# Patient Record
Sex: FEMALE | Race: WHITE | NOT HISPANIC OR LATINO | Employment: OTHER | URBAN - METROPOLITAN AREA
[De-identification: names, ages, dates, MRNs, and addresses within clinical notes are randomized per-mention and may not be internally consistent; named-entity substitution may affect disease eponyms.]

---

## 2018-06-11 ENCOUNTER — OFFICE VISIT (OUTPATIENT)
Dept: FAMILY MEDICINE | Facility: CLINIC | Age: 68
End: 2018-06-11
Payer: MEDICARE

## 2018-06-11 ENCOUNTER — TELEPHONE (OUTPATIENT)
Dept: FAMILY MEDICINE | Facility: CLINIC | Age: 68
End: 2018-06-11

## 2018-06-11 ENCOUNTER — HOSPITAL ENCOUNTER (EMERGENCY)
Facility: CLINIC | Age: 68
Discharge: HOME | End: 2018-06-11
Attending: EMERGENCY MEDICINE | Admitting: EMERGENCY MEDICINE
Payer: MEDICARE

## 2018-06-11 VITALS
HEART RATE: 77 BPM | BODY MASS INDEX: 29.66 KG/M2 | OXYGEN SATURATION: 96 % | HEIGHT: 67 IN | WEIGHT: 189 LBS | DIASTOLIC BLOOD PRESSURE: 96 MMHG | SYSTOLIC BLOOD PRESSURE: 242 MMHG | TEMPERATURE: 98.4 F

## 2018-06-11 VITALS
OXYGEN SATURATION: 96 % | SYSTOLIC BLOOD PRESSURE: 195 MMHG | WEIGHT: 189 LBS | HEART RATE: 85 BPM | BODY MASS INDEX: 29.6 KG/M2 | RESPIRATION RATE: 13 BRPM | DIASTOLIC BLOOD PRESSURE: 77 MMHG | TEMPERATURE: 98.1 F

## 2018-06-11 DIAGNOSIS — I16.0 HYPERTENSIVE URGENCY: ICD-10-CM

## 2018-06-11 DIAGNOSIS — I10 HYPERTENSION GOAL BP (BLOOD PRESSURE) < 130/80: ICD-10-CM

## 2018-06-11 DIAGNOSIS — I16.0 ASYMPTOMATIC HYPERTENSIVE URGENCY: Primary | ICD-10-CM

## 2018-06-11 LAB
ALBUMIN UR-MCNC: NEGATIVE MG/DL
ANION GAP SERPL CALCULATED.3IONS-SCNC: 12 MMOL/L (ref 3–14)
ANION GAP SERPL CALCULATED.3IONS-SCNC: 14 MMOL/L (ref 6–17)
APPEARANCE UR: CLEAR
BACTERIA #/AREA URNS HPF: ABNORMAL /HPF
BASOPHILS # BLD AUTO: 0.1 10E9/L (ref 0–0.2)
BASOPHILS NFR BLD AUTO: 0.9 %
BILIRUB UR QL STRIP: NEGATIVE
BUN SERPL-MCNC: 20 MG/DL (ref 7–30)
BUN SERPL-MCNC: 22 MG/DL (ref 7–30)
CA-I BLD-SCNC: 4.7 MG/DL (ref 4.4–5.2)
CALCIUM SERPL-MCNC: 9 MG/DL (ref 8.5–10.1)
CHLORIDE BLD-SCNC: 107 MMOL/L (ref 94–109)
CHLORIDE SERPL-SCNC: 109 MMOL/L (ref 94–109)
CO2 BLD-SCNC: 21 MMOL/L (ref 20–32)
CO2 SERPL-SCNC: 19 MMOL/L (ref 20–32)
COLOR UR AUTO: YELLOW
CREAT BLD-MCNC: 0.7 MG/DL (ref 0.52–1.04)
CREAT SERPL-MCNC: 0.93 MG/DL (ref 0.52–1.04)
DIFFERENTIAL METHOD BLD: NORMAL
EOSINOPHIL # BLD AUTO: 0.3 10E9/L (ref 0–0.7)
EOSINOPHIL NFR BLD AUTO: 3.2 %
ERYTHROCYTE [DISTWIDTH] IN BLOOD BY AUTOMATED COUNT: 12.9 % (ref 10–15)
GFR SERPL CREATININE-BSD FRML MDRD: 60 ML/MIN/1.7M2
GFR SERPL CREATININE-BSD FRML MDRD: 84 ML/MIN/1.7M2
GLUCOSE BLD-MCNC: 96 MG/DL (ref 70–99)
GLUCOSE SERPL-MCNC: 86 MG/DL (ref 70–99)
GLUCOSE UR STRIP-MCNC: NEGATIVE MG/DL
HCT VFR BLD AUTO: 43.7 % (ref 35–47)
HCT VFR BLD CALC: 43 %PCV (ref 35–47)
HGB BLD CALC-MCNC: 14.6 G/DL (ref 11.7–15.7)
HGB BLD-MCNC: 14.5 G/DL (ref 11.7–15.7)
HGB UR QL STRIP: NEGATIVE
IMM GRANULOCYTES # BLD: 0 10E9/L (ref 0–0.4)
IMM GRANULOCYTES NFR BLD: 0.3 %
KETONES UR STRIP-MCNC: 20 MG/DL
LEUKOCYTE ESTERASE UR QL STRIP: NEGATIVE
LYMPHOCYTES # BLD AUTO: 2.7 10E9/L (ref 0.8–5.3)
LYMPHOCYTES NFR BLD AUTO: 34.6 %
MCH RBC QN AUTO: 31.2 PG (ref 26.5–33)
MCHC RBC AUTO-ENTMCNC: 33.2 G/DL (ref 31.5–36.5)
MCV RBC AUTO: 94 FL (ref 78–100)
MONOCYTES # BLD AUTO: 0.7 10E9/L (ref 0–1.3)
MONOCYTES NFR BLD AUTO: 9.5 %
MUCOUS THREADS #/AREA URNS LPF: PRESENT /LPF
NEUTROPHILS # BLD AUTO: 4 10E9/L (ref 1.6–8.3)
NEUTROPHILS NFR BLD AUTO: 51.5 %
NITRATE UR QL: NEGATIVE
NRBC # BLD AUTO: 0 10*3/UL
NRBC BLD AUTO-RTO: 0 /100
PH UR STRIP: 6 PH (ref 5–7)
PLATELET # BLD AUTO: 289 10E9/L (ref 150–450)
POTASSIUM BLD-SCNC: 4 MMOL/L (ref 3.4–5.3)
POTASSIUM SERPL-SCNC: 4 MMOL/L (ref 3.4–5.3)
RBC # BLD AUTO: 4.65 10E12/L (ref 3.8–5.2)
RBC #/AREA URNS AUTO: 2 /HPF (ref 0–2)
SODIUM BLD-SCNC: 142 MMOL/L (ref 133–144)
SODIUM SERPL-SCNC: 140 MMOL/L (ref 133–144)
SOURCE: ABNORMAL
SP GR UR STRIP: 1.02 (ref 1–1.03)
SQUAMOUS #/AREA URNS AUTO: 2 /HPF (ref 0–1)
TROPONIN I BLD-MCNC: 0 UG/L (ref 0–0.1)
TROPONIN I SERPL-MCNC: <0.015 UG/L (ref 0–0.04)
UROBILINOGEN UR STRIP-MCNC: 0 MG/DL (ref 0–2)
WBC # BLD AUTO: 7.8 10E9/L (ref 4–11)
WBC #/AREA URNS AUTO: 2 /HPF (ref 0–5)

## 2018-06-11 PROCEDURE — 99284 EMERGENCY DEPT VISIT MOD MDM: CPT | Mod: 25

## 2018-06-11 PROCEDURE — 84484 ASSAY OF TROPONIN QUANT: CPT | Performed by: EMERGENCY MEDICINE

## 2018-06-11 PROCEDURE — 96375 TX/PRO/DX INJ NEW DRUG ADDON: CPT

## 2018-06-11 PROCEDURE — A9270 NON-COVERED ITEM OR SERVICE: HCPCS | Mod: GY | Performed by: EMERGENCY MEDICINE

## 2018-06-11 PROCEDURE — 85025 COMPLETE CBC W/AUTO DIFF WBC: CPT | Performed by: EMERGENCY MEDICINE

## 2018-06-11 PROCEDURE — 25000128 H RX IP 250 OP 636: Performed by: EMERGENCY MEDICINE

## 2018-06-11 PROCEDURE — 84484 ASSAY OF TROPONIN QUANT: CPT

## 2018-06-11 PROCEDURE — 80047 BASIC METABLC PNL IONIZED CA: CPT

## 2018-06-11 PROCEDURE — 81001 URINALYSIS AUTO W/SCOPE: CPT | Performed by: EMERGENCY MEDICINE

## 2018-06-11 PROCEDURE — 99213 OFFICE O/P EST LOW 20 MIN: CPT | Performed by: PHYSICIAN ASSISTANT

## 2018-06-11 PROCEDURE — 96374 THER/PROPH/DIAG INJ IV PUSH: CPT

## 2018-06-11 PROCEDURE — 93005 ELECTROCARDIOGRAM TRACING: CPT

## 2018-06-11 PROCEDURE — 25000132 ZZH RX MED GY IP 250 OP 250 PS 637: Performed by: EMERGENCY MEDICINE

## 2018-06-11 PROCEDURE — 80048 BASIC METABOLIC PNL TOTAL CA: CPT | Performed by: EMERGENCY MEDICINE

## 2018-06-11 PROCEDURE — 85014 HEMATOCRIT: CPT

## 2018-06-11 RX ORDER — HYDRALAZINE HYDROCHLORIDE 20 MG/ML
10 INJECTION INTRAMUSCULAR; INTRAVENOUS ONCE
Status: COMPLETED | OUTPATIENT
Start: 2018-06-11 | End: 2018-06-11

## 2018-06-11 RX ORDER — LABETALOL HYDROCHLORIDE 5 MG/ML
20 INJECTION, SOLUTION INTRAVENOUS ONCE
Status: DISCONTINUED | OUTPATIENT
Start: 2018-06-11 | End: 2018-06-11 | Stop reason: HOSPADM

## 2018-06-11 RX ORDER — TRIAMTERENE AND HYDROCHLOROTHIAZIDE 37.5; 25 MG/1; MG/1
1 CAPSULE ORAL DAILY
Qty: 30 CAPSULE | Refills: 1 | Status: SHIPPED | OUTPATIENT
Start: 2018-06-11 | End: 2018-06-14

## 2018-06-11 RX ORDER — CLONIDINE HYDROCHLORIDE 0.1 MG/1
0.1 TABLET ORAL ONCE
Status: COMPLETED | OUTPATIENT
Start: 2018-06-11 | End: 2018-06-11

## 2018-06-11 RX ADMIN — CLONIDINE HYDROCHLORIDE 0.1 MG: 0.1 TABLET ORAL at 10:48

## 2018-06-11 RX ADMIN — HYDRALAZINE HYDROCHLORIDE 10 MG: 20 INJECTION INTRAMUSCULAR; INTRAVENOUS at 12:01

## 2018-06-11 ASSESSMENT — ENCOUNTER SYMPTOMS
DIZZINESS: 0
HEADACHES: 0

## 2018-06-11 NOTE — PROGRESS NOTES
"  SUBJECTIVE:                                                    Miroslava Handy is a 68 year old female who presents to clinic today for the following health issues:    Recheck Ear and BP - was 230/135 at ER  ED/UC Followup:    Facility:  [facility]  Date of visit: 06/02/2018  Reason for visit: Left Otitis Media  Current Status: given Amox - 1 tablet left - feels much better     Patient is here today for a recheck.  She was in the hospital on 06.02.2018 for ear pain.  She was found to have a left ear infection.  She was put on amoxicillin and the ear pain is better.  She reports that she was advised to followup with here PCP for a recheck of the ear and to have the BP re-checked.  She reports that the ear pain is largely improved and she does not have any concerns regarding symptoms.    She says that when she was in the ED in [place], her BP was elevated.  She has not had any symptoms and therefore was not treated for the high blood pressure.  She says that she used to be on hydrochlorothiazide to treat hypertension, but stopped it a while back because she did not have money for it.     She does not have any symptoms or concerns today.      She says that about 2 weeks ago when she was mowing the lawn she got a little dizzy, but otherwise she typically does not have a problem.  She was in the ED after the dizzy      Problem list and histories reviewed & adjusted, as indicated.  Additional history: as documented      ROS:  Constitutional, HEENT, cardiovascular, pulmonary, GI, , musculoskeletal, neuro, skin, endocrine and psych systems are negative, except as otherwise noted.    OBJECTIVE:                                                    BP (!) 242/96 (BP Location: Left arm, Patient Position: Chair, Cuff Size: Adult Large)  Pulse 77  Temp 98.4  F (36.9  C) (Oral)  Ht 5' 7\" (1.702 m)  Wt 189 lb (85.7 kg)  SpO2 96%  Breastfeeding? No  BMI 29.6 kg/m2  Body mass index is 29.6 " kg/(m^2).  GENERAL: healthy, alert and no distress  EYES: Eyes grossly normal to inspection, PERRL and conjunctivae and sclerae normal  MS: no gross musculoskeletal defects noted, no edema  NEURO: Normal strength and tone, mentation intact and speech normal  PSYCH: mentation appears normal, affect normal/bright    Diagnostic Test Results:  none      ASSESSMENT/PLAN:                                                      Miroslava was seen today for recheck.    Diagnoses and all orders for this visit:    Asymptomatic hypertensive urgency    Hypertension goal BP (blood pressure) < 130/80    - Patient has a history of hypertension that has not been treated due to cost of prescriptions.  She is here today for a re-check of her ear that she reports is dramatically improved.  Her BP in clinic is very elevated, and therefore she was advised to go to the ER for further workup and assessment.    - Patient declined ambulance transport as she reports she is fine to drive herself.  She says she is asymptomatic and had an elevated BP at the [facility] last week that was untreated.    - Patient left clinic without further exam and agrees to transport to the ED by private vehicle.   - Ed has been called and informed of her pending arrival and they agree to continue workup and treatment on patient.     -- I have discussed the patient's diagnosis, and my plan of treatment with the patient and/or family. Patient is aware to followup if symptoms do not improve.  Patient has been advised to be seen sooner or seek more immediate care if symptoms change or worsen.  Patient agrees with and understands the plan today.     See Patient Instructions        Dena Jack PA-C    [facility]

## 2018-06-11 NOTE — TELEPHONE ENCOUNTER
Reason for Call:  Same Day Appointment, Requested Provider:  Rome Perdue MD    PCP: Rome Perdue    Reason for visit: ER f/u for HBP      Have you been treated for this in the past? Yes    Additional comments: Miroslava is requesting an appointment for Thursday 6/14/18. Any time will be ok.    Can we leave a detailed message on this number? YES    Phone number patient can be reached at: Home number on file [phone] (home)      Call taken on 6/11/2018 at 3:34 PM by Sandra Kimble

## 2018-06-11 NOTE — ED AVS SNAPSHOT
[facility] Emergency Department    [address]    [facility] [address]    Phone:  [phone]    Fax:  [phone]                                       Miroslava Handy   MRN: [id number]    Department:  [facility] Emergency Department   Date of Visit:  6/11/2018           After Visit Summary Signature Page     I have received my discharge instructions, and my questions have been answered. I have discussed any challenges I see with this plan with the nurse or doctor.    ..........................................................................................................................................  Patient/Patient Representative Signature      ..........................................................................................................................................  Patient Representative Print Name and Relationship to Patient    ..................................................               ................................................  Date                                            Time    ..........................................................................................................................................  Reviewed by Signature/Title    ...................................................              ..............................................  Date                                                            Time

## 2018-06-11 NOTE — DISCHARGE INSTRUCTIONS
Please followup with PCP in the next few days.    We started you on a gentle water pill/diuretic medicine.    Need a formal blood pressure evaluation.

## 2018-06-11 NOTE — MR AVS SNAPSHOT
After Visit Summary   6/11/2018    Miroslava Handy    MRN: [id number]           Patient Information     Date Of Birth          1950        Visit Information        Provider Department      6/11/2018 8:40 AM Dena Jack PA-C [facility]        Today's Diagnoses     Asymptomatic hypertensive urgency    -  1    Hypertension goal BP (blood pressure) < 130/80           Follow-ups after your visit        Follow-up notes from your care team     Return in about 1 day (around 6/12/2018) for More immediate care today at the ED for the HTN.  .      Your next 10 appointments already scheduled     Jun 20, 2018  8:15 AM CDT   MA SCREENING DIGITAL BILATERAL with [facility]   [facility]    [address] [place] [id number] [phone]           Do not use any powder, lotion or deodorant under your arms or on your breast. If you do, we will ask you to remove it before your exam.  Wear comfortable, two-piece clothing.  If you have any allergies, tell your care team.  Bring any previous mammograms from other facilities or have them mailed to the breast center.            Jun 20, 2018  8:45 AM CDT   Nurse Only with RV ANT[facility]AGULATION CLINIC   [facility]    [address] [place] [id number] [phone]            Jun 20, 2018  9:15 AM CDT   LAB with RV LAB   [facility]    [address] [place] [id number] [phone]           Please do not eat 10-12 hours before your appointment if you are coming in fasting for labs on lipids, cholesterol, or glucose (sugar). This does not apply to pregnant women. Water, hot tea and black coffee (with nothing added) are okay. Do not drink other fluids, diet soda or chew gum.              Who to contact     If you have questions or need follow up  "information about today's clinic visit or your schedule please contact [facility] directly at [phone].  Normal or non-critical lab and imaging results will be communicated to you by MyChart, letter or phone within 4 business days after the clinic has received the results. If you do not hear from us within 7 days, please contact the clinic through MyChart or phone. If you have a critical or abnormal lab result, we will notify you by phone as soon as possible.  Submit refill requests through [employer] or call your pharmacy and they will forward the refill request to us. Please allow 3 business days for your refill to be completed.          Additional Information About Your Visit        Care EveryWhere ID     This is your Care EveryWhere ID. This could be used by other organizations to access your [place] medical records  [id number]        Your Vitals Were     Pulse Temperature Height Pulse Oximetry Breastfeeding? BMI (Body Mass Index)    77 98.4  F (36.9  C) (Oral) 5' 7\" (1.702 m) 96% No 29.6 kg/m2       Blood Pressure from Last 3 Encounters:   06/14/18 152/90   06/11/18 195/77   06/11/18 (!) 242/96    Weight from Last 3 Encounters:   06/14/18 184 lb 9.6 oz (83.7 kg)   06/11/18 189 lb (85.7 kg)   06/11/18 189 lb (85.7 kg)              Today, you had the following     No orders found for display       Primary Care Provider Office Phone # Fax #    Rome Perdue [phone] [address] [facility] [address]        Equal Access to Services     [facility]: Hadii merlyn delarosa hadasho Soedwin, waaxda luqadaha, qaybta kaalmada rodger dorsey . So [facility] [phone].    ATENCIÓN: Si habla español, tiene a ramirez disposición servicios gratuitos de asistencia lingüística. Llame al [phone].    We comply with applicable federal civil rights laws and [place] laws. We do not discriminate on the basis of race, color, national origin, age, " disability, sex, sexual orientation, or gender identity.            Thank you!     Thank you for choosing [facility]  for your care. Our goal is always to provide you with excellent care. Hearing back from our patients is one way we can continue to improve our services. Please take a few minutes to complete the written survey that you may receive in the mail after your visit with us. Thank you!             Your Updated Medication List - Protect others around you: Learn how to safely use, store and throw away your medicines at www.disposemymeds.org.          This list is accurate as of 6/11/18 11:59 PM.  Always use your most recent med list.                   Brand Name Dispense Instructions for use Diagnosis    aspirin 81 MG tablet     100    ONE DAILY    Hypertension

## 2018-06-11 NOTE — ED TRIAGE NOTES
"Reports seen at clinic for ear infection, sent to ED due to \"high blood pressure\".  Denies dizziness, HA, or syncopal events.  Weston's intact,  "

## 2018-06-11 NOTE — ED PROVIDER NOTES
"  History     Chief Complaint:  Hypertension    HPI   Miroslava Handy is a 68 year old female with a history of HTN who presents to the emergency department for evaluation of HTN. Of note, the patient was seen at 6/2 for an ear infection when it was noted the patient had elevated blood pressure. The patient went in for recheck at clinic today for the ear and it was again noted that the patient had elevated blood pressure. She was sent to the ED today to get treatment for her current HTN. The patient denies any dizziness, headache, or syncope. She is also not on any medications and denies any past or recent heart problems. Of note, the patient's daughter indicates her last measured blood pressure was around 245/180.  Patient just finished her antibiotics.  No chest pain or cough.  No abdominal pain or vomiting.    Allergies:  Codeine     Medications:    Aspirin     Past Medical History:    Tobacco use disorder  HTN- not treated  Otitis media    Past Surgical History:    C exploratory of abdomen  HC dilation/curettage Diag/Ther x2    Family History:    HTN  CAD  Cerebrovascular disease  Leukemia  DM    Social History:  Presents with daughter.  Current every day smoker.  Positive for alcohol use.   Marital Status:   [4]     Review of Systems   Cardiovascular:        Hypertensive   Neurological: Negative for dizziness, syncope and headaches.   All other systems reviewed and are negative.  Report that she patient was seen at clinic for ear infection recheck, and sent to ED due to \"high blood pressure\".  Denies dizziness, HA, or syncopal events.     Physical Exam     Patient Vitals for the past 24 hrs:   BP Temp Temp src Pulse Heart Rate Resp SpO2 Weight   06/11/18 1433 195/77 - - - - - - -   06/11/18 1431 - - - - 79 13 96 % -   06/11/18 1400 190/83 - - - 78 13 96 % -   06/11/18 1300 180/83 - - - 79 14 97 % -   06/11/18 1230 196/80 - - - - - - -   06/11/18 1215 - - - - 80 25 96 % -   06/11/18 1200 197/87 - - - - " - - -   06/11/18 1158 197/87 - - - 73 13 95 % -   06/11/18 1108 (!) 238/88 - - - - - - -   06/11/18 1047 (!) 248/110 - - - - - - -   06/11/18 1026 (!) 240/92 98.1  F (36.7  C) Oral 85 - 20 99 % 85.7 kg (189 lb)     Physical Exam  GEN: patient smiling, no distress  HEAD: atraumatic, normocephalic  EYES: pupils reactive (3plus to 2plus), extraocular muscles intact, conjunctivae normal  ENT: TMs flat and white bilaterally, oropharynx normal with no erythema or exudate, mucus membranes moist  NECK: no cervical LAD, no JVD  RESPIRATORY: no tachypnea, breath sounds clear to auscultation (no rales, wheezes, rhonchi), chest wall nontender, normal phonation  CVS: normal S1/S2, no murmurs/rubs/gallops  ABDOMEN: soft, nontender, no masses or organomegaly, no rebound, positive bowel sounds  BACK: no costovertebral angle tenderness  EXTREMITIES: intact pulses x 4, full range of motion at joints, no edema  MUSCULOSKELETAL: no deformities  SKIN: warm and dry  NEURO: GCS 15, cranial nerves intact.  Motor- moves all 4 extremities.  Sensation- intact.  Coordination- ambulatory.  Overall symmetrical exam  HEME: no bruising or petechiae/contusions  LYMPH: no lymphadenopathy      Emergency Department Course   ECG:  Time: 1110  Vent. Rate 63 bpm. HI interval 158. QRS duration 86. QT/QTc 406/415. P-R-T axis 66 6 49. Normal sinus rhythm. Normal ECG. Read time: 1111  Normal axis.  No acute STEMI.    Laboratory:  CBC: WBC: 7.8, HGB: 14.5, PLT: 289  ISTAT BMP POCT: all WNL (Creatinine: 0.7)    1343 Troponin 1: 0.00    UA with micro: Keton 20, Bacteria Few, Squamous Epithelial 2 (H), Mucous Present, o/w negative    CBC with platelets differential (Final result) Component (Lab Inquiry)       Collection Time Result Time WBC RBC HGB HCT MCV     06/11/18 10:57:00 06/11/18 11:40:27 7.8 4.65 14.5 43.7 94          Collection Time Result Time MCH MCHC RDW PLT DIFF METHO     06/11/18 10:57:00 06/11/18 11:40:27 31.2 33.2 12.9 289 Automated Method       "    Collection Time Result Time % Neutrophils % Lymphocytes % Monocytes % Eosinophils % Basophils     06/11/18 10:57:00 06/11/18 11:40:27 51.5 34.6 9.5 3.2 0.9          Collection Time Result Time % Immature Granulocytes Nucleated RBCs Absolute Neutrophil Absolute Lymphocytes Absolute Monocytes     06/11/18 10:57:00 06/11/18 11:40:27 0.3 0 4.0 2.7 0.7          Collection Time Result Time Absolute Eosinophils Absolute Basophils Abs Immature Granulocytes Absolute Nucleated RBC     06/11/18 10:57:00 06/11/18 11:40:27 0.3 0.1 0.0 0.0                      Basic metabolic panel (Final result)    Abnormal Component (Lab Inquiry)       Collection Time Result Time NA POTASSIUM Chloride Carbon Dioxide ANION GAP     06/11/18 10:57:00 06/11/18 15:16:31 140 4.0 109 19 (L) 12          Collection Time Result Time GLUCOSE BUN CREATININE GFR Estimate GFR Estimate If Black     06/11/18 10:57:00 06/11/18 15:16:31 86 22 0.93 60 (L)  Non  GFR Calc 73   GFR Calc          Collection Time Result Time Calcium     06/11/18 10:57:00 06/11/18 15:16:31 9.0                      Troponin I (Final result) Component (Lab Inquiry)       Collection Time Result Time Troponin I ES     06/11/18 10:57:00 06/11/18 15:16:31 The 99th percentile for upper reference range is 0.045 ug/L. Troponin values    in the range of 0.045 - 0.120 ug/L may be associated with risks of adverse    clinical events.    ' data-bubble=\"IP_HOVER_BUBBLE_SERVICE\"><0.015  The 99th percentile fo...                   Interventions:  1048 Catapres 0.1 mg PO  1201 Apresoline 10 mg IV  Heplock  Cardiac/Sp02 monitoring  Oxygen by nasal cannula at 2L/min    Emergency Department Course:  Nursing notes and vitals reviewed. 1042 I performed an exam of the patient as documented above.     EKG obtained in the ED, see results above.     The patient provided a urine sample here in the emergency department. This was sent for laboratory testing, findings above. "     IV inserted. Medicine administered as documented above. Blood drawn. This was sent to the lab for further testing, results above.    1316 I rechecked the patient and discussed the results of her workup thus far.     Findings and plan explained to the Patient and daughter. Patient discharged home with instructions regarding supportive care, medications, and reasons to return. The importance of close follow-up was reviewed. The patient was prescribed Dyazide.    I personally reviewed the laboratory results with the Patient and daughter and answered all related questions prior to discharge.     12:24 PM /77  Pulse 85  Temp 98.1  F (36.7  C) (Oral)  Resp 13  Wt 85.7 kg (189 lb)  SpO2 96%  BMI 29.6 kg/m2    1:16 PM recheck    2:26 PM Updated, patient family in the room  /77  Pulse 85  Temp 98.1  F (36.7  C) (Oral)  Resp 13  Wt 85.7 kg (189 lb)  SpO2 96%  BMI 29.6 kg/m2    Discussed results with patient.  Gave patient copies of results (applicable labs, CT scans and/or ultrasound).  Answered questions.  Asked patient to followup with PCP.    Impression & Plan      Medical Decision Making:  Miroslava Handy is a 68 year old female who had a recent ear infection, for which she completed her course of antibiotics. She went back to clinic today to recheck the ear, and they noticed her blood pressure was elevated. On examination with me, the ears are normal and her neurological exam is normal and she is smiling and joking, but her blood pressure is systolic with 240 and the diastolic number is sometimes over 110 here in the ER. An IV was placed and lbas were sent and she was given Catapres orally, as well as Hydralazine IV. Her blood pressure has since then started to decrease. It is still elevated but not as high. Formal troponin and chemical panel is still pending, but the bedside troponin and chem panel is otherwise normal. CBC does not show any abnormalities and UA is pending. EKG did not show  any ST elevation, MI. At this point, the patient would like to go home, and will follow up PCP, which I think is reasonable. We will start her on Dyazide. She was given copies of all her studies. She understands that likely this will not be the final blood pressure medicine, and she will need to follow up with the PCP in the next few days.    Diagnosis:    ICD-10-CM   1. Hypertensive urgency I16.0       Disposition:  discharged to home with Dyazide.    Discharge Medications:  New Prescriptions    TRIAMTERENE-HYDROCHLOROTHIAZIDE (DYAZIDE) 37.5-25 MG PER CAPSULE    Take 1 capsule by mouth daily     Instructions to patient:  Please followup with PCP in the next few days.    We started you on a gentle water pill/diuretic medicine.    Need a formal blood pressure evaluation.    I, Soy Mike, am serving as a scribe on 6/11/2018 at 10:42 AM to personally document services performed by Mai David MD based on my observations and the provider's statements to me.     Soy Mike  6/11/2018   [facility] EMERGENCY DEPARTMENT       Mai David MD  06/12/18 1112

## 2018-06-11 NOTE — ED AVS SNAPSHOT
[facility] Emergency Department    [address]    [facility] [address]    Phone:  [phone]    Fax:  [phone]                                       Miroslava Handy   MRN: [id number]    Department:  [facility] Emergency Department   Date of Visit:  6/11/2018           Patient Information     Date Of Birth          1950        Your diagnoses for this visit were:     Hypertensive urgency        You were seen by Mai David MD.      Follow-up Information     Follow up with Rome Perdue MD.    Specialty:  Family Practice    Contact information:    [address] [place] [address]  [phone]          Discharge Instructions       Please followup with PCP in the next few days.    We started you on a gentle water pill/diuretic medicine.    Need a formal blood pressure evaluation.    Discharge References/Attachments     HYPERTENSION, NEW (BEGIN TREATMENT) (ENGLISH)    HIGH BLOOD PRESSURE, WHAT IS?  (ENGLISH)      24 Hour Appointment Hotline       To make an appointment at any [facility] clinic, call [phone] ([phone]). If you don't have a family doctor or clinic, we will help you find one. [facility] clinics are conveniently located to serve the needs of you and your family.             Review of your medicines      START taking        Dose / Directions Last dose taken    triamterene-hydrochlorothiazide 37.5-25 MG per capsule   Commonly known as:  DYAZIDE   Dose:  1 capsule   Quantity:  30 capsule        Take 1 capsule by mouth daily   Refills:  1          Our records show that you are taking the medicines listed below. If these are incorrect, please call your family doctor or clinic.        Dose / Directions Last dose taken    aspirin 81 MG tablet   Quantity:  100        ONE DAILY   Refills:  3                Prescriptions were sent or printed at these locations (1 Prescription)                   Other Prescriptions                 Printed at Department/Unit printer (1 of 1)         triamterene-hydrochlorothiazide (DYAZIDE) 37.5-25 MG per capsule                Procedures and tests performed during your visit     Basic metabolic panel    CBC with platelets differential    EKG 12-lead, tracing only    ISTAT Basic Met ICa HCT POCT    ISTAT Chem 8    ISTAT troponin    Peripheral IV catheter    Troponin I    Troponin POCT    UA with Microscopic      Orders Needing Specimen Collection     None      Pending Results     Date and Time Order Name Status Description    6/11/2018 1104 EKG 12-lead, tracing only Preliminary     6/11/2018 1036 UA with Microscopic In process     6/11/2018 1036 Troponin I In process     6/11/2018 1036 Basic metabolic panel In process             Pending Culture Results     Date and Time Order Name Status Description    6/11/2018 1036 UA with Microscopic In process             Pending Results Instructions     If you had any lab results that were not finalized at the time of your Discharge, you can call the ED Lab Result RN at [phone]. You will be contacted by this team for any positive Lab results or changes in treatment. The nurses are available 7 days a week from 10A to 6:30P.  You can leave a message 24 hours per day and they will return your call.        Test Results From Your Hospital Stay        6/11/2018 11:40 AM      Component Results     Component Value Ref Range & Units Status    WBC 7.8 4.0 - 11.0 10e9/L Final    RBC Count 4.65 3.8 - 5.2 10e12/L Final    Hemoglobin 14.5 11.7 - 15.7 g/dL Final    Hematocrit 43.7 35.0 - 47.0 % Final    MCV 94 78 - 100 fl Final    MCH 31.2 26.5 - 33.0 pg Final    MCHC 33.2 31.5 - 36.5 g/dL Final    RDW 12.9 10.0 - 15.0 % Final    Platelet Count 289 150 - 450 10e9/L Final    Diff Method Automated Method  Final    % Neutrophils 51.5 % Final    % Lymphocytes 34.6 % Final    % Monocytes 9.5 % Final    % Eosinophils 3.2 % Final    % Basophils 0.9 % Final    % Immature Granulocytes 0.3  % Final    Nucleated RBCs 0 0 /100 Final    Absolute Neutrophil 4.0 1.6 - 8.3 10e9/L Final    Absolute Lymphocytes 2.7 0.8 - 5.3 10e9/L Final    Absolute Monocytes 0.7 0.0 - 1.3 10e9/L Final    Absolute Eosinophils 0.3 0.0 - 0.7 10e9/L Final    Absolute Basophils 0.1 0.0 - 0.2 10e9/L Final    Abs Immature Granulocytes 0.0 0 - 0.4 10e9/L Final    Absolute Nucleated RBC 0.0  Final         6/11/2018 11:33 AM         6/11/2018 11:33 AM         6/11/2018  2:13 PM         6/11/2018  2:09 PM      Component Results     Component Value Ref Range & Units Status    Sodium 142 133 - 144 mmol/L Final    Potassium 4.0 3.4 - 5.3 mmol/L Final    Chloride 107 94 - 109 mmol/L Final    Total CO2 21 20 - 32 mmol/L Final    Anion Gap 14 6 - 17 mmol/L Final    Glucose 96 70 - 99 mg/dL Final    Urea Nitrogen 20 7 - 30 mg/dL Final    Creatinine 0.7 0.52 - 1.04 mg/dL Final    GFR Estimate 84 >60 mL/min/1.7m2 Final    GFR Estimate If Black >90 >60 mL/min/1.7m2 Final    Calcium Ionized 4.7 4.4 - 5.2 mg/dL Final    Hemoglobin 14.6 11.7 - 15.7 g/dL Final    Hematocrit - POCT 43 35.0 - 47.0 %PCV Final         6/11/2018  2:10 PM      Component Results     Component Value Ref Range & Units Status    Troponin I 0.00 0.00 - 0.10 ug/L Final                Clinical Quality Measure: Blood Pressure Screening     Your blood pressure was checked while you were in the emergency department today. The last reading we obtained was  BP: 190/83 . Please read the guidelines below about what these numbers mean and what you should do about them.  If your systolic blood pressure (the top number) is less than 120 and your diastolic blood pressure (the bottom number) is less than 80, then your blood pressure is normal. There is nothing more that you need to do about it.  If your systolic blood pressure (the top number) is 120-139 or your diastolic blood pressure (the bottom number) is 80-89, your blood pressure may be higher than it should be. You should have your  blood pressure rechecked within a year by a primary care provider.  If your systolic blood pressure (the top number) is 140 or greater or your diastolic blood pressure (the bottom number) is 90 or greater, you may have high blood pressure. High blood pressure is treatable, but if left untreated over time it can put you at risk for heart attack, stroke, or kidney failure. You should have your blood pressure rechecked by a primary care provider within the next 4 weeks.  If your provider in the emergency department today gave you specific instructions to follow-up with your doctor or provider even sooner than that, you should follow that instruction and not wait for up to 4 weeks for your follow-up visit.        Thank you for choosing [facility]       Thank you for choosing [facility] for your care. Our goal is always to provide you with excellent care. Hearing back from our patients is one way we can continue to improve our services. Please take a few minutes to complete the written survey that you may receive in the mail after you visit with us. Thank you!        Care EveryWhere ID     This is your Care EveryWhere ID. This could be used by other organizations to access your [facility] medical records  [id number]        Equal Access to Services     RADHA MYERS : Hadangelita Varela, anderson berrios, rodger pichardo. So [facility] [phone].    ATENCIÓN: Si habla español, tiene a ramirez disposición servicios gratuitos de asistencia lingüística. Llame al [phone].    We comply with applicable federal civil rights laws and [place] laws. We do not discriminate on the basis of race, color, national origin, age, disability, sex, sexual orientation, or gender identity.            After Visit Summary       This is your record. Keep this with you and show to your community pharmacist(s) and doctor(s) at your next visit.

## 2018-06-12 LAB — INTERPRETATION ECG - MUSE: NORMAL

## 2018-06-12 NOTE — PROGRESS NOTES
"  SUBJECTIVE:                                                    Miroslava Handy is a 68 year old female who presents to clinic today for the following health issues:    ED/UC Followup:    Facility:  [facility]   Date of visit: 6/11/18  Reason for visit: Hypertensive Urgency   Current Status: feels Ok - no headaches or chest pains. A bit tired and fatigued. Dropped 5 pounds with diuretic initiation.      Since the hospital visit Miroslava has stopped tobacco and caffeine use, which has caused some headaches. She has also has implemented a low sodium diet rich in proteins and vegetables..      BP Readings from Last 3 Encounters:   06/14/18 152/90   06/11/18 195/77   06/11/18 (!) 242/96     Problem list and histories reviewed & adjusted, as indicated.  Additional history: as documented    ROS:  Constitutional, HEENT, cardiovascular, pulmonary, GI, , musculoskeletal, neuro, skin, endocrine and psych systems are negative, except as otherwise noted.    This document serves as a record of the services and decisions personally performed and made by Rome Perdue MD. It was created on her behalf by Dalia Lang, a trained medical scribe. The creation of this document is based the provider's statements to the medical scribe.  Scribgavin Lang 2:00 PM, June 14, 2018    OBJECTIVE:                                                    /90  Pulse 100  Temp 98.1  F (36.7  C) (Oral)  Ht 1.702 m (5' 7\")  Wt 83.7 kg (184 lb 9.6 oz)  SpO2 97%  Breastfeeding? No  BMI 28.91 kg/m2 Body mass index is 28.91 kg/(m^2).     GENERAL: healthy, alert, well nourished, well hydrated, no distress  EYES: Eyes grossly normal to inspection, extraocular movements - intact, and PERRL  HENT:  Nose- normal; Mouth- no ulcers, no lesions  NECK: no tenderness, no adenopathy, no asymmetry, no masses, no stiffness; thyroid- normal to palpation  RESP: lungs clear to auscultation - no rales, no rhonchi, no wheezes  CV: No " narrowing of renal artery, otherwise regular rates and rhythm, normal S1 S2, no S3 or S4 and no murmur, no click or rub -  ABDOMEN: soft, no tenderness, no  hepatosplenomegaly, no masses, normal bowel sounds  MS: extremities- no gross deformities noted, no edema  SKIN: no suspicious lesions, no rashes    Diagnostic test results:  none      ASSESSMENT/PLAN:         Miroslava was seen today for hospital f/u.    Diagnoses and all orders for this visit:    Hypertension goal BP (blood pressure) < 130/80 - partially controlled  Encouraged patient to continue current healthy diet and avoid caffeine and tobacco use. Lisinopril is prescribed and instructions were given for use. Regular BP readings are recommended.   -     Cancel: Albumin Random Urine Quantitative with Creat Ratio  -     triamterene-hydrochlorothiazide (DYAZIDE) 37.5-25 MG per capsule; Take 1 capsule by mouth daily  -     lisinopril (PRINIVIL/ZESTRIL) 10 MG tablet; Take 1 tablet (10 mg) by mouth daily  -     Basic metabolic panel  (Ca, Cl, CO2, Creat, Gluc, K, Na, BUN); Future  -     MICROALBUMIN; Future  -     Albumin Random Urine Quantitative with Creat Ratio; Future    Tobacco use disorder - quit 6/12/18  Miroslava stopped tobacco use after hospital visit.     Hyperlipidemia LDL goal <100   Appointment made for next Wednesday to complete fasting labs.   -     Lipid panel reflex to direct LDL Fasting; Future    Screen for colon cancer  -     Fecal colorectal cancer screen (FIT); Future    Asymptomatic postmenopausal status    Visit for screening mammogram  Patient plans to come in next Wednesday for mammogram.   -     MA Screening Digital Bilateral; Future    Need for hepatitis C screening test - declined testing    Need for prophylactic vaccination against Streptococcus pneumoniae (pneumococcus)  -     Pneumococcal vaccine 13 valent PCV13 IM (Prevnar) [80453]  -          ADMIN VACCINE, ADDL [74143]    Need for prophylactic vaccination with tetanus-diphtheria  (TD)  -     ADMIN: Vaccine, Initial (60462)  -     TDAP VACCINE (ADACEL)    Risks, benefits and alternatives of treatments discussed. Plan agreed on.      Followup: Return in about 1 week (around 6/21/2018) for recheck.     See patient instructions.     Tobacco Cessation:   reports that she quit smoking today. Her smoking use included Cigarettes. She has a 27.90 pack-year smoking history. She has never used smokeless tobacco.    The information in this document, created by the medical scribe for me, accurately reflects the services I personally performed and the decisions made by me. I have reviewed and approved this document for accuracy prior to leaving the patient care area.  June 14, 2018 2:13 PM        Marcial Perdue MD   Pager: [phone]

## 2018-06-14 ENCOUNTER — OFFICE VISIT (OUTPATIENT)
Dept: FAMILY MEDICINE | Facility: CLINIC | Age: 68
End: 2018-06-14
Payer: MEDICARE

## 2018-06-14 VITALS
DIASTOLIC BLOOD PRESSURE: 90 MMHG | HEART RATE: 100 BPM | WEIGHT: 184.6 LBS | TEMPERATURE: 98.1 F | HEIGHT: 67 IN | SYSTOLIC BLOOD PRESSURE: 152 MMHG | OXYGEN SATURATION: 97 % | BODY MASS INDEX: 28.97 KG/M2

## 2018-06-14 DIAGNOSIS — Z78.0 ASYMPTOMATIC POSTMENOPAUSAL STATUS: ICD-10-CM

## 2018-06-14 DIAGNOSIS — E78.5 HYPERLIPIDEMIA LDL GOAL <100: ICD-10-CM

## 2018-06-14 DIAGNOSIS — Z12.11 SCREEN FOR COLON CANCER: ICD-10-CM

## 2018-06-14 DIAGNOSIS — I10 HYPERTENSION GOAL BP (BLOOD PRESSURE) < 130/80: Primary | ICD-10-CM

## 2018-06-14 DIAGNOSIS — Z12.31 VISIT FOR SCREENING MAMMOGRAM: ICD-10-CM

## 2018-06-14 DIAGNOSIS — Z23 NEED FOR PROPHYLACTIC VACCINATION WITH TETANUS-DIPHTHERIA (TD): ICD-10-CM

## 2018-06-14 DIAGNOSIS — Z11.59 NEED FOR HEPATITIS C SCREENING TEST: ICD-10-CM

## 2018-06-14 DIAGNOSIS — Z23 NEED FOR PROPHYLACTIC VACCINATION AGAINST STREPTOCOCCUS PNEUMONIAE (PNEUMOCOCCUS): ICD-10-CM

## 2018-06-14 DIAGNOSIS — F17.200 TOBACCO USE DISORDER: ICD-10-CM

## 2018-06-14 PROCEDURE — 90715 TDAP VACCINE 7 YRS/> IM: CPT | Performed by: FAMILY MEDICINE

## 2018-06-14 PROCEDURE — 99214 OFFICE O/P EST MOD 30 MIN: CPT | Mod: 25 | Performed by: FAMILY MEDICINE

## 2018-06-14 PROCEDURE — 90670 PCV13 VACCINE IM: CPT | Performed by: FAMILY MEDICINE

## 2018-06-14 PROCEDURE — 90472 IMMUNIZATION ADMIN EACH ADD: CPT | Performed by: FAMILY MEDICINE

## 2018-06-14 PROCEDURE — G0009 ADMIN PNEUMOCOCCAL VACCINE: HCPCS | Performed by: FAMILY MEDICINE

## 2018-06-14 RX ORDER — LISINOPRIL 10 MG/1
10 TABLET ORAL DAILY
Qty: 90 TABLET | Refills: 1 | Status: SHIPPED | OUTPATIENT
Start: 2018-06-14 | End: 2018-06-22

## 2018-06-14 RX ORDER — TRIAMTERENE AND HYDROCHLOROTHIAZIDE 37.5; 25 MG/1; MG/1
1 CAPSULE ORAL DAILY
Qty: 90 CAPSULE | Refills: 1 | Status: SHIPPED | OUTPATIENT
Start: 2018-06-14 | End: 2018-12-03

## 2018-06-14 NOTE — MR AVS SNAPSHOT
After Visit Summary   6/14/2018    Miroslava Handy    MRN: [id number]           Patient Information     Date Of Birth          1950        Visit Information        Provider Department      6/14/2018 1:40 PM Rome Perdue MD [facility]'s Diagnoses     Hypertension goal BP (blood pressure) < 130/80    -  1    Screen for colon cancer        Asymptomatic postmenopausal status        Visit for screening mammogram        Need for hepatitis C screening test        Need for prophylactic vaccination against Streptococcus pneumoniae (pneumococcus)        Need for prophylactic vaccination with tetanus-diphtheria (TD)        Tobacco use disorder - quit 6/12/18           Follow-ups after your visit        Follow-up notes from your care team     Return in about 1 week (around 6/21/2018) for Routine Visit, recheck.      Future tests that were ordered for you today     Open Future Orders        Priority Expected Expires Ordered    MA Screening Digital Bilateral Routine  6/14/2019 6/14/2018    Fecal colorectal cancer screen (FIT) Routine 7/5/2018 9/6/2018 6/14/2018    Basic metabolic panel  (Ca, Cl, CO2, Creat, Gluc, K, Na, BUN) Routine 7/14/2018 6/14/2019 6/14/2018            Who to contact     If you have questions or need follow up information about today's clinic visit or your schedule please contact [facility] directly at [phone].  Normal or non-critical lab and imaging results will be communicated to you by MyChart, letter or phone within 4 business days after the clinic has received the results. If you do not hear from us within 7 days, please contact the clinic through MyChart or phone. If you have a critical or abnormal lab result, we will notify you by phone as soon as possible.  Submit refill requests through [employer]-datum or call your pharmacy and they will forward the refill request to us. Please allow 3 business days for your refill to be completed.  "         Additional Information About Your Visit        Care EveryWhere ID     This is your Care EveryWhere ID. This could be used by other organizations to access your [facility] medical records  [id number]        Your Vitals Were     Pulse Temperature Height Pulse Oximetry Breastfeeding? BMI (Body Mass Index)    100 98.1  F (36.7  C) (Oral) 5' 7\" (1.702 m) 97% No 28.91 kg/m2       Blood Pressure from Last 3 Encounters:   06/14/18 152/90   06/11/18 195/77   06/11/18 (!) 242/96    Weight from Last 3 Encounters:   06/14/18 184 lb 9.6 oz (83.7 kg)   06/11/18 189 lb (85.7 kg)   06/11/18 189 lb (85.7 kg)              We Performed the Following          ADMIN VACCINE, ADDL [43480]     ADMIN: Vaccine, Initial (96429)     Albumin Random Urine Quantitative with Creat Ratio     Pneumococcal vaccine 13 valent PCV13 IM (Prevnar) [73467]     TDAP VACCINE (ADACEL)          Today's Medication Changes          These changes are accurate as of 6/14/18  2:12 PM.  If you have any questions, ask your nurse or doctor.               Start taking these medicines.        Dose/Directions    lisinopril 10 MG tablet   Commonly known as:  PRINIVIL/ZESTRIL   Used for:  Hypertension goal BP (blood pressure) < 130/80   Started by:  Rome Perdue MD        Dose:  10 mg   Take 1 tablet (10 mg) by mouth daily   Quantity:  90 tablet   Refills:  1            Where to get your medicines      These medications were sent to [facility] [address] [place] [facility] [place], [place] - [address] [facility] [address]  [address] [facility] [address], [place] [address]     Phone:  [phone]     lisinopril 10 MG tablet    triamterene-hydrochlorothiazide 37.5-25 MG per capsule                Primary Care Provider Office Phone # Fax #    Rome Perdue [phone]       [address] [place] [address]        Equal Access to Services     FILIPPO MYERS AH: Markell graf Soedwin, waaxda luqadaha, qaybta kaaljorge a dorsey, rodger delgado'aan ah. So " [facility] [phone].    ATENCIÓN: Si habla martin, tiene a ramirez disposición servicios gratuitos de asistencia lingüística. Brain aguirre [phone].    We comply with applicable federal civil rights laws and [place] laws. We do not discriminate on the basis of race, color, national origin, age, disability, sex, sexual orientation, or gender identity.            Thank you!     Thank you for choosing [facility]  for your care. Our goal is always to provide you with excellent care. Hearing back from our patients is one way we can continue to improve our services. Please take a few minutes to complete the written survey that you may receive in the mail after your visit with us. Thank you!             Your Updated Medication List - Protect others around you: Learn how to safely use, store and throw away your medicines at www.disposemymeds.org.          This list is accurate as of 6/14/18  2:12 PM.  Always use your most recent med list.                   Brand Name Dispense Instructions for use Diagnosis    aspirin 81 MG tablet     100    ONE DAILY    Hypertension       lisinopril 10 MG tablet    PRINIVIL/ZESTRIL    90 tablet    Take 1 tablet (10 mg) by mouth daily    Hypertension goal BP (blood pressure) < 130/80       triamterene-hydrochlorothiazide 37.5-25 MG per capsule    DYAZIDE    90 capsule    Take 1 capsule by mouth daily    Hypertension goal BP (blood pressure) < 130/80

## 2018-06-15 ENCOUNTER — HOSPITAL ENCOUNTER (OUTPATIENT)
Facility: CLINIC | Age: 68
Setting detail: SPECIMEN
Discharge: HOME | End: 2018-06-15
Admitting: FAMILY MEDICINE
Payer: MEDICARE

## 2018-06-15 PROCEDURE — 82274 ASSAY TEST FOR BLOOD FECAL: CPT | Performed by: FAMILY MEDICINE

## 2018-06-17 DIAGNOSIS — Z12.11 SCREEN FOR COLON CANCER: ICD-10-CM

## 2018-06-17 LAB — HEMOCCULT STL QL IA: NEGATIVE

## 2018-06-17 NOTE — LETTER
[address] [place] [address]                  [phone]   June 18, 2018    Miroslava Handy  [address] [place] [address]      Dear Miroslava,    Here is a summary of your recent test results:    -FIT test (screening test for colon cancer) was normal. ADVISE - rechecking in 1 year.     For additional lab test information, [email or web address].org is an excellent reference.     Your test results are enclosed.      Please contact me if you have any questions.    In addition, here is a list of due or overdue Health Maintenance reminders.    Health Maintenance Due   Topic Date Due     Microalbumin Lab - yearly  01/19/1951     Cholesterol Lab - every 5 years  01/19/1997     Mammogram - every 2 years  01/19/2002     Colon Cancer Screening - every 10 years.  01/19/2002     Discuss Advance Directive Planning  01/19/2007     FALL RISK ASSESSMENT  01/19/2017     Bone Density Screening (Dexa)  01/19/2017       Please call us at [phone] (or use [employer]) to address the above recommendations.            Thank you very much for trusting [facility]..     Healthy regards,          Marcial Perdue M.D.      Results for orders placed or performed in visit on 06/17/18   Fecal colorectal cancer screen (FIT)   Result Value Ref Range    Occult Blood Scn FIT Negative NEG^Negative

## 2018-06-18 NOTE — PROGRESS NOTES
Note to Staff: please send a result letter    -FIT test (screening test for colon cancer) was normal. ADVISE - rechecking in 1 year.     For additional lab test information, [email or web address] is an excellent reference.

## 2018-06-20 ENCOUNTER — RADIANT APPOINTMENT (OUTPATIENT)
Dept: MAMMOGRAPHY | Facility: CLINIC | Age: 68
End: 2018-06-20
Payer: MEDICARE

## 2018-06-20 ENCOUNTER — ALLIED HEALTH/NURSE VISIT (OUTPATIENT)
Dept: NURSING | Facility: CLINIC | Age: 68
End: 2018-06-20
Payer: MEDICARE

## 2018-06-20 VITALS
WEIGHT: 194 LBS | RESPIRATION RATE: 18 BRPM | DIASTOLIC BLOOD PRESSURE: 80 MMHG | OXYGEN SATURATION: 98 % | SYSTOLIC BLOOD PRESSURE: 152 MMHG | HEART RATE: 78 BPM | BODY MASS INDEX: 30.38 KG/M2

## 2018-06-20 DIAGNOSIS — I10 HYPERTENSION GOAL BP (BLOOD PRESSURE) < 130/80: Primary | ICD-10-CM

## 2018-06-20 DIAGNOSIS — I10 HYPERTENSION GOAL BP (BLOOD PRESSURE) < 130/80: ICD-10-CM

## 2018-06-20 DIAGNOSIS — Z12.31 VISIT FOR SCREENING MAMMOGRAM: ICD-10-CM

## 2018-06-20 DIAGNOSIS — E78.5 HYPERLIPIDEMIA LDL GOAL <100: ICD-10-CM

## 2018-06-20 LAB
ANION GAP SERPL CALCULATED.3IONS-SCNC: 9 MMOL/L (ref 3–14)
BUN SERPL-MCNC: 55 MG/DL (ref 7–30)
CALCIUM SERPL-MCNC: 9.3 MG/DL (ref 8.5–10.1)
CHLORIDE SERPL-SCNC: 103 MMOL/L (ref 94–109)
CO2 SERPL-SCNC: 25 MMOL/L (ref 20–32)
CREAT SERPL-MCNC: 2.5 MG/DL (ref 0.52–1.04)
GFR SERPL CREATININE-BSD FRML MDRD: 19 ML/MIN/1.7M2
GLUCOSE SERPL-MCNC: 102 MG/DL (ref 70–99)
POTASSIUM SERPL-SCNC: 5.3 MMOL/L (ref 3.4–5.3)
SODIUM SERPL-SCNC: 137 MMOL/L (ref 133–144)

## 2018-06-20 PROCEDURE — 77067 SCR MAMMO BI INCL CAD: CPT | Mod: TC

## 2018-06-20 PROCEDURE — 82043 UR ALBUMIN QUANTITATIVE: CPT | Performed by: FAMILY MEDICINE

## 2018-06-20 PROCEDURE — 80048 BASIC METABOLIC PNL TOTAL CA: CPT | Performed by: FAMILY MEDICINE

## 2018-06-20 PROCEDURE — 36415 COLL VENOUS BLD VENIPUNCTURE: CPT | Performed by: FAMILY MEDICINE

## 2018-06-20 PROCEDURE — 99207 ZZC NO CHARGE NURSE ONLY: CPT

## 2018-06-20 NOTE — PROGRESS NOTES
Hypertension Follow-up      Outpatient blood pressures are not being checked.    Low Salt Diet: low salt      Amount of exercise or physical activity: None    Problems taking medications regularly: No    Medication side effects: having an upset stomach     Diet: low salt    Denies: CP,SOB, headaches, blurred vision     Idania Reynolds RN, BSN  [facility] Triage       BP Readings from Last 3 Encounters:   06/20/18 152/80   06/14/18 152/90   06/11/18 195/77          Routing to PCP for review

## 2018-06-20 NOTE — MR AVS SNAPSHOT
After Visit Summary   6/20/2018    Miroslava Handy    MRN: [id number]           Patient Information     Date Of Birth          1950        Visit Information        Provider Department      6/20/2018 8:45 AM [facility]        Today's Diagnoses     Hypertension goal BP (blood pressure) < 130/80    -  1       Follow-ups after your visit        Future tests that were ordered for you today     Open Future Orders        Priority Expected Expires Ordered    Lipid panel reflex to direct LDL Fasting Routine 7/14/2018 6/20/2019 6/20/2018            Who to contact     If you have questions or need follow up information about today's clinic visit or your schedule please contact [facility] directly at [phone].  Normal or non-critical lab and imaging results will be communicated to you by MyChart, letter or phone within 4 business days after the clinic has received the results. If you do not hear from us within 7 days, please contact the clinic through MyChart or phone. If you have a critical or abnormal lab result, we will notify you by phone as soon as possible.  Submit refill requests through "[employer] or call your pharmacy and they will forward the refill request to us. Please allow 3 business days for your refill to be completed.          Additional Information About Your Visit        Care EveryWhere ID     This is your Care EveryWhere ID. This could be used by other organizations to access your [facility] medical records  [id number]        Your Vitals Were     Pulse Respirations Pulse Oximetry BMI (Body Mass Index)          78 18 98% 30.38 kg/m2         Blood Pressure from Last 3 Encounters:   06/20/18 152/80   06/14/18 152/90   06/11/18 195/77    Weight from Last 3 Encounters:   06/20/18 194 lb (88 kg)   06/14/18 184 lb 9.6 oz (83.7 kg)   06/11/18 189 lb (85.7 kg)              Today, you had the following     No orders found for display        Primary Care Provider Office Phone # Fax #    Rome Perdue [phone] [address] [facility] [address]        Equal Access to Services     RADHA MYERS : Hadangelita merlyn delarosa lesia Soedwin, waaxda luqadaha, qaybta kaalmada sunita, rodger reyes laMoizabraham mcbride. So [facility] [phone].    ATENCIÓN: Si habla español, tiene a ramirez disposición servicios gratuitos de asistencia lingüística. Llame al [phone].    We comply with applicable federal civil rights laws and [place] laws. We do not discriminate on the basis of race, color, national origin, age, disability, sex, sexual orientation, or gender identity.            Thank you!     Thank you for choosing [facility]  for your care. Our goal is always to provide you with excellent care. Hearing back from our patients is one way we can continue to improve our services. Please take a few minutes to complete the written survey that you may receive in the mail after your visit with us. Thank you!             Your Updated Medication List - Protect others around you: Learn how to safely use, store and throw away your medicines at www.disposemymeds.org.          This list is accurate as of 6/20/18 10:08 AM.  Always use your most recent med list.                   Brand Name Dispense Instructions for use Diagnosis    aspirin 81 MG tablet     100    ONE DAILY    Hypertension       lisinopril 10 MG tablet    PRINIVIL/ZESTRIL    90 tablet    Take 1 tablet (10 mg) by mouth daily    Hypertension goal BP (blood pressure) < 130/80       triamterene-hydrochlorothiazide 37.5-25 MG per capsule    DYAZIDE    90 capsule    Take 1 capsule by mouth daily    Hypertension goal BP (blood pressure) < 130/80

## 2018-06-21 LAB
CREAT UR-MCNC: 108 MG/DL
MICROALBUMIN UR-MCNC: 10 MG/L
MICROALBUMIN/CREAT UR: 9.72 MG/G CR (ref 0–25)

## 2018-06-22 DIAGNOSIS — I10 HYPERTENSION GOAL BP (BLOOD PRESSURE) < 130/80: Primary | ICD-10-CM

## 2018-06-22 RX ORDER — AMLODIPINE BESYLATE 5 MG/1
5 TABLET ORAL DAILY
Qty: 90 TABLET | Refills: 1 | Status: SHIPPED | OUTPATIENT
Start: 2018-06-22 | End: 2018-06-25

## 2018-06-22 NOTE — PROGRESS NOTES
Note to Staff: please call to make sure she does BP and lab recheck on 6/25/18    I called her with the results.

## 2018-06-22 NOTE — PROGRESS NOTES
Cr is elevated on recent lab - will stop lisinopril - caused some nausea anyway.  Will add amlodipine 5 mg daily and recheck BMP on Monday - 6/25/18 - please call to make sure she comes in.

## 2018-06-25 ENCOUNTER — OFFICE VISIT (OUTPATIENT)
Dept: FAMILY MEDICINE | Facility: CLINIC | Age: 68
End: 2018-06-25
Payer: MEDICARE

## 2018-06-25 ENCOUNTER — ALLIED HEALTH/NURSE VISIT (OUTPATIENT)
Dept: NURSING | Facility: CLINIC | Age: 68
End: 2018-06-25
Payer: MEDICARE

## 2018-06-25 VITALS
RESPIRATION RATE: 20 BRPM | HEART RATE: 111 BPM | SYSTOLIC BLOOD PRESSURE: 214 MMHG | DIASTOLIC BLOOD PRESSURE: 111 MMHG | TEMPERATURE: 97.1 F | HEIGHT: 67 IN | WEIGHT: 184 LBS | OXYGEN SATURATION: 96 % | BODY MASS INDEX: 28.88 KG/M2

## 2018-06-25 VITALS
BODY MASS INDEX: 28.88 KG/M2 | RESPIRATION RATE: 20 BRPM | DIASTOLIC BLOOD PRESSURE: 102 MMHG | HEART RATE: 111 BPM | OXYGEN SATURATION: 96 % | TEMPERATURE: 97.1 F | SYSTOLIC BLOOD PRESSURE: 203 MMHG | HEIGHT: 67 IN | WEIGHT: 184 LBS

## 2018-06-25 DIAGNOSIS — I10 HYPERTENSION GOAL BP (BLOOD PRESSURE) < 130/80: Primary | ICD-10-CM

## 2018-06-25 PROCEDURE — 80048 BASIC METABOLIC PNL TOTAL CA: CPT | Performed by: FAMILY MEDICINE

## 2018-06-25 PROCEDURE — 99213 OFFICE O/P EST LOW 20 MIN: CPT | Performed by: FAMILY MEDICINE

## 2018-06-25 PROCEDURE — 99207 ZZC NO CHARGE NURSE ONLY: CPT

## 2018-06-25 PROCEDURE — 36415 COLL VENOUS BLD VENIPUNCTURE: CPT | Performed by: FAMILY MEDICINE

## 2018-06-25 RX ORDER — AMLODIPINE BESYLATE 5 MG/1
10 TABLET ORAL DAILY
Qty: 90 TABLET | Refills: 1
Start: 2018-06-25 | End: 2018-06-25

## 2018-06-25 RX ORDER — METOPROLOL SUCCINATE 25 MG/1
25 TABLET, EXTENDED RELEASE ORAL DAILY
Qty: 90 TABLET | Refills: 1 | Status: SHIPPED | OUTPATIENT
Start: 2018-06-25 | End: 2018-06-28

## 2018-06-25 RX ORDER — AMLODIPINE BESYLATE 5 MG/1
10 TABLET ORAL DAILY
Qty: 180 TABLET | Refills: 1 | Status: SHIPPED | OUTPATIENT
Start: 2018-06-25 | End: 2018-11-06

## 2018-06-25 RX ORDER — AMLODIPINE BESYLATE 5 MG/1
10 TABLET ORAL DAILY
Qty: 90 TABLET | Refills: 1 | Status: SHIPPED | OUTPATIENT
Start: 2018-06-25 | End: 2018-06-25

## 2018-06-25 ASSESSMENT — PAIN SCALES - GENERAL: PAINLEVEL: NO PAIN (0)

## 2018-06-25 NOTE — MR AVS SNAPSHOT
After Visit Summary   6/25/2018    Miroslava Handy    MRN: [id number]           Patient Information     Date Of Birth          1950        Visit Information        Provider Department      6/25/2018 1:15 PM RV ANTICOAGULATION CLINIC [facility]        Today's Diagnoses     Hypertension goal BP (blood pressure) < 130/80    -  1       Follow-ups after your visit        Your next 10 appointments already scheduled     Jun 26, 2018  3:00 PM CDT   Nurse Only with  ANTICOAGULATION CLINIC   [facility]    [address] [place] [id number]   [phone]            Jun 28, 2018 11:40 AM CDT   Office Visit with Rome Perdue MD   [facility]    [address] [place] [address]   [phone]           Bring a current list of meds and any records pertaining to this visit. For Physicals, please bring immunization records and any forms needing to be filled out. Please arrive 10 minutes early to complete paperwork.              Who to contact     If you have questions or need follow up information about today's clinic visit or your schedule please contact [facility] directly at [phone].  Normal or non-critical lab and imaging results will be communicated to you by MyChart, letter or phone within 4 business days after the clinic has received the results. If you do not hear from us within 7 days, please contact the clinic through MyChart or phone. If you have a critical or abnormal lab result, we will notify you by phone as soon as possible.  Submit refill requests through Primedic or call your pharmacy and they will forward the refill request to us. Please allow 3 business days for your refill to be completed.          Additional Information About Your Visit        Care EveryWhere ID     This is your Care EveryWhere ID. This could be  "used by other organizations to access your [facility] medical records  [id number]        Your Vitals Were     Pulse Temperature Respirations Height Pulse Oximetry BMI (Body Mass Index)    111 97.1  F (36.2  C) (Oral) 20 5' 7\" (1.702 m) 96% 28.82 kg/m2       Blood Pressure from Last 3 Encounters:   06/25/18 (!) 203/102   06/25/18 (!) 214/111   06/20/18 152/80    Weight from Last 3 Encounters:   06/25/18 184 lb (83.5 kg)   06/25/18 184 lb (83.5 kg)   06/20/18 194 lb (88 kg)              Today, you had the following     No orders found for display         Today's Medication Changes          These changes are accurate as of 6/25/18  2:02 PM.  If you have any questions, ask your nurse or doctor.               Start taking these medicines.        Dose/Directions    amLODIPine 5 MG tablet   Commonly known as:  NORVASC   Used for:  Hypertension goal BP (blood pressure) < 130/80   Started by:  Rome Perdue MD        Dose:  10 mg   Take 2 tablets (10 mg) by mouth daily   Quantity:  180 tablet   Refills:  1       metoprolol succinate 25 MG 24 hr tablet   Commonly known as:  TOPROL-XL   Used for:  Hypertension goal BP (blood pressure) < 130/80   Started by:  Rome Perdue MD        Dose:  25 mg   Take 1 tablet (25 mg) by mouth daily   Quantity:  90 tablet   Refills:  1            Where to get your medicines      These medications were sent to [facility] [place] [id number]     Phone:  [phone]     amLODIPine 5 MG tablet    metoprolol succinate 25 MG 24 hr tablet                Primary Care Provider Office Phone # Fax #    Rome Perdue -[phone]       [address] [place] [address]        Equal Access to Services     RADHA MYERS AH: Markell Varela, wala nena luqadaha, qaybta kaalmarodger goldman. [facility] [phone].    ATENCIÓN: Si habla " español, tiene a ramirez disposición servicios gratuitos de asistencia lingüística. Brain aguirre [phone].    We comply with applicable federal civil rights laws and [place] laws. We do not discriminate on the basis of race, color, national origin, age, disability, sex, sexual orientation, or gender identity.            Thank you!     Thank you for choosing [facility]  for your care. Our goal is always to provide you with excellent care. Hearing back from our patients is one way we can continue to improve our services. Please take a few minutes to complete the written survey that you may receive in the mail after your visit with us. Thank you!             Your Updated Medication List - Protect others around you: Learn how to safely use, store and throw away your medicines at www.disposemymeds.org.          This list is accurate as of 6/25/18  2:02 PM.  Always use your most recent med list.                   Brand Name Dispense Instructions for use Diagnosis    amLODIPine 5 MG tablet    NORVASC    180 tablet    Take 2 tablets (10 mg) by mouth daily    Hypertension goal BP (blood pressure) < 130/80       aspirin 81 MG tablet     100    ONE DAILY    Hypertension       metoprolol succinate 25 MG 24 hr tablet    TOPROL-XL    90 tablet    Take 1 tablet (25 mg) by mouth daily    Hypertension goal BP (blood pressure) < 130/80       triamterene-hydrochlorothiazide 37.5-25 MG per capsule    DYAZIDE    90 capsule    Take 1 capsule by mouth daily    Hypertension goal BP (blood pressure) < 130/80

## 2018-06-25 NOTE — PROGRESS NOTES
Concern - Elevated BP  Onset: Chronic Issue    Description:    Patient walked into clinic to have BP checked per MD CECILE recommendation. Patient was directed to pharmacy where BP came back @ 212/102    Alondra Pharmacist - had RN take patient to clinic to be assessed    Intensity: moderate    Progression of Symptoms:  same and constant    Accompanying Signs & Symptoms:  HA - just started @ 1300 (1/10 pain)  Slight dizziness/lightheadedness    Previous history of similar problem:   Yes    Precipitating factors:   Worsened by: Anxiety    Alleviating factors:  Improved by: None    Therapies Tried and outcome: see below     DENIES: CP, SOB, Difficulty Breathing, Numbness/Tingling, HA, Vision/Speech Changes, N/V, black curtain over eyes, black spots in eyes    Patient saw MD CECILE on 06/14/2018 for HTN - was told to FU in 1 week  BP Readings from Last 6 Encounters:   06/25/18 (!) 214/111   06/20/18 152/80   06/14/18 152/90   06/11/18 195/77   06/11/18 (!) 242/96   09/29/09 128/78     Patient currently taking amLODIPine (NORVASC) 5 MG tablet (1 tab PO daily) and triamterene-hydrochlorothiazide (DYAZIDE) 37.5-25 MG per capsule (1 cap PO daily)    Patient just started the amlodipine on 06/24/2018 - has taken 2 doses    OV moved to MD CECILE schedule    Elsa Winters RN  [facility]

## 2018-06-25 NOTE — LETTER
[facility]  [address] [place] [address]  [place] [address]                  [phone]   June 27, 2018    Miroslava Handy  [address] [place] [address]      Dear Miroslava,    Here is a summary of your recent test results:      -Kidney function (GFR) is decreased but improving.  ADVISE: rechecking this in 2-4 days   -Sodium is normal.   -Potassium is normal.   -Glucose is slight elevated but this may not have been a fasting sample.     For additional lab test information, labtestsonline.org is an excellent reference.                  Your test results are enclosed.      Please contact me if you have any questions.    In addition, here is a list of due or overdue Health Maintenance reminders.    Health Maintenance Due   Topic Date Due     Depression Assessment 2 - yearly  01/19/1964     Cholesterol Lab - every 5 years  01/19/1997     Colon Cancer Screening - every 10 years.  01/19/2002     Discuss Advance Directive Planning  01/19/2007     FALL RISK ASSESSMENT  01/19/2017     Bone Density Screening (Dexa)  01/19/2017       Please call us at [phone] (or use incrediblue) to address the above recommendations.            Thank you very much for trusting [facility]..     Healthy regards,          Marcial Perdue M.D.        Results for orders placed or performed in visit on 06/25/18   Basic metabolic panel  (Ca, Cl, CO2, Creat, Gluc, K, Na, BUN)   Result Value Ref Range    Sodium 141 133 - 144 mmol/L    Potassium 4.7 3.4 - 5.3 mmol/L    Chloride 107 94 - 109 mmol/L    Carbon Dioxide 22 20 - 32 mmol/L    Anion Gap 12 3 - 14 mmol/L    Glucose 168 (H) 70 - 99 mg/dL    Urea Nitrogen 58 (H) 7 - 30 mg/dL    Creatinine 1.87 (H) 0.52 - 1.04 mg/dL    GFR Estimate 27 (L) >60 mL/min/1.7m2    GFR Estimate If Black 33 (L) >60 mL/min/1.7m2    Calcium 9.7 8.5 - 10.1 mg/dL

## 2018-06-25 NOTE — MR AVS SNAPSHOT
After Visit Summary   6/25/2018    Miroslava Handy    MRN: [id number]           Patient Information     Date Of Birth          1950        Visit Information        Provider Department      6/25/2018 1:40 PM Rome Perdue MD [facility]        Today's Diagnoses     Hypertension goal BP (blood pressure) < 130/80    -  1       Follow-ups after your visit        Follow-up notes from your care team     Return in about 1 day (around 6/26/2018).      Your next 10 appointments already scheduled     Jun 28, 2018 11:40 AM CDT   Office Visit with Rome Perdue MD   [facility]    [address] [place] [address] [phone]           Bring a current list of meds and any records pertaining to this visit. For Physicals, please bring immunization records and any forms needing to be filled out. Please arrive 10 minutes early to complete paperwork.              Who to contact     If you have questions or need follow up information about today's clinic visit or your schedule please contact [facility] directly at [phone].  Normal or non-critical lab and imaging results will be communicated to you by MyChart, letter or phone within 4 business days after the clinic has received the results. If you do not hear from us within 7 days, please contact the clinic through MyChart or phone. If you have a critical or abnormal lab result, we will notify you by phone as soon as possible.  Submit refill requests through [employer]alot or call your pharmacy and they will forward the refill request to us. Please allow 3 business days for your refill to be completed.          Additional Information About Your Visit        Care EveryWhere ID     This is your Care EveryWhere ID. This could be used by other organizations to access your [facility] medical records  [id number]         Blood Pressure from Last 3 Encounters:    06/25/18 (!) 214/111   06/20/18 152/80   06/14/18 152/90    Weight from Last 3 Encounters:   06/25/18 184 lb (83.5 kg)   06/20/18 194 lb (88 kg)   06/14/18 184 lb 9.6 oz (83.7 kg)              We Performed the Following     Basic metabolic panel  (Ca, Cl, CO2, Creat, Gluc, K, Na, BUN)          Today's Medication Changes          These changes are accurate as of 6/25/18  1:44 PM.  If you have any questions, ask your nurse or doctor.               Start taking these medicines.        Dose/Directions    amLODIPine 5 MG tablet   Commonly known as:  NORVASC   Used for:  Hypertension goal BP (blood pressure) < 130/80   Started by:  Rome Perdue MD        Dose:  10 mg   Take 2 tablets (10 mg) by mouth daily   Quantity:  90 tablet   Refills:  1       metoprolol succinate 25 MG 24 hr tablet   Commonly known as:  TOPROL-XL   Used for:  Hypertension goal BP (blood pressure) < 130/80   Started by:  Rome Perdue MD        Dose:  25 mg   Take 1 tablet (25 mg) by mouth daily   Quantity:  90 tablet   Refills:  1            Where to get your medicines      These medications were sent to [place] [id number]     Phone:  [phone]     metoprolol succinate 25 MG 24 hr tablet         Some of these will need a paper prescription and others can be bought over the counter.  Ask your nurse if you have questions.     You don't need a prescription for these medications     amLODIPine 5 MG tablet                Primary Care Provider Office Phone # Fax #    Rome Perdue -[phone]       [address] [place] [address]        Equal Access to Services     RADHA MYERS AH: Markell Varela, waaxda luqadaha, qaybta kaalmada adeegyada, rodger mcbride. So [facility] [phone].    ATENCIÓN: Si habla español, tiene a ramirez disposición servicios gratuitos de asistencia  lingüística. Brain al [phone].    We comply with applicable federal civil rights laws and [place] laws. We do not discriminate on the basis of race, color, national origin, age, disability, sex, sexual orientation, or gender identity.            Thank you!     Thank you for choosing [facility]  for your care. Our goal is always to provide you with excellent care. Hearing back from our patients is one way we can continue to improve our services. Please take a few minutes to complete the written survey that you may receive in the mail after your visit with us. Thank you!             Your Updated Medication List - Protect others around you: Learn how to safely use, store and throw away your medicines at www.disposemymeds.org.          This list is accurate as of 6/25/18  1:44 PM.  Always use your most recent med list.                   Brand Name Dispense Instructions for use Diagnosis    amLODIPine 5 MG tablet    NORVASC    90 tablet    Take 2 tablets (10 mg) by mouth daily    Hypertension goal BP (blood pressure) < 130/80       aspirin 81 MG tablet     100    ONE DAILY    Hypertension       metoprolol succinate 25 MG 24 hr tablet    TOPROL-XL    90 tablet    Take 1 tablet (25 mg) by mouth daily    Hypertension goal BP (blood pressure) < 130/80       triamterene-hydrochlorothiazide 37.5-25 MG per capsule    DYAZIDE    90 capsule    Take 1 capsule by mouth daily    Hypertension goal BP (blood pressure) < 130/80

## 2018-06-25 NOTE — PROGRESS NOTES
SUBJECTIVE:   Miroslava Handy is a 68 year old female who presents to clinic today for the following health issues:    Concern - Elevated BP  Onset: Chronic Issue    Description:    Patient walked into clinic to have BP checked per MD CECILE recommendation. Patient was directed to pharmacy where BP came back @ 212/102    Alondra, Pharmacist - had RN take patient to clinic to be assessed    Intensity: moderate    Progression of Symptoms:  same and constant    Accompanying Signs & Symptoms:  HA - just started @ 1300 (1/10 pain)  Slight dizziness/lightheadedness    Previous history of similar problem:   Yes    Precipitating factors:   Worsened by: Anxiety    Alleviating factors:  Improved by: None     Therapies Tried and outcome: see below      Patient currently taking amLODIPine (NORVASC) 5 MG tablet (1 tab PO daily) and triamterene-hydrochlorothiazide (DYAZIDE) 37.5-25 MG per capsule (1 cap PO daily). Pt just started the amlodipine on 6/22/2018 due to nausea and elevated Cr from lisinopril - has taken 2 dose. Patient saw me on 06/14/2018 for HTN - was told to FU in 1 week      DENIES: CP, SOB, Difficulty Breathing, Numbness/Tingling, HA, Vision/Speech Changes, N/V, black curtain over eyes, black spots in eyes         BP Readings from Last 6 Encounters:   06/25/18 (!) 214/111   06/20/18 152/80   06/14/18 152/90   06/11/18 195/77   06/11/18 (!) 242/96   09/29/09 128/78          Problem list and histories reviewed & adjusted, as indicated.  Additional history: as documented      Reviewed and updated as needed this visit by clinical staff  Allergies  Meds  Problems       Reviewed and updated as needed this visit by Provider  Allergies  Meds  Problems         ROS:  Constitutional, HEENT, cardiovascular, pulmonary, GI, , musculoskeletal, neuro, skin, endocrine and psych systems are negative, except as otherwise noted.    This document serves as a record of the services and decisions personally performed and made by  "Rome Perdue MD. It was created on his behalf by Enriqueta Harry, a trained medical scribe. The creation of this document is based on the provider's statements to the medical scribe.  Enriqueta Piero 1:35 PM 6/25/2018  OBJECTIVE:   BP (!) 203/102  Pulse 111  Temp 97.1  F (36.2  C) (Oral)  Resp 20  Ht 5' 7\" (1.702 m)  Wt 184 lb (83.5 kg)  SpO2 96%  BMI 28.82 kg/m2  Body mass index is 28.82 kg/(m^2).  GENERAL: healthy, alert and no distress  NECK: no adenopathy, no asymmetry, masses, or scars and thyroid normal to palpation  RESP: lungs clear to auscultation - no rales, rhonchi or wheezes  CV: regular rate and rhythm, normal S1 S2, no S3 or S4, no murmur, click or rub, no peripheral edema and peripheral pulses strong  ABDOMEN: soft, nontender, no hepatosplenomegaly, no masses and bowel sounds normal  MS: no gross musculoskeletal defects noted, no edema  SKIN: no suspicious lesions or rashes    Diagnostic Test Results:  Pending   ASSESSMENT/PLAN:   Diagnoses and all orders for this visit:    Hypertension goal BP (blood pressure) < 130/80 - Increase dose of amlodipine to 10 mg. Start taking 25 mg of metoprolol daily. Continue with triamterene-hydrochlorothiazide dose. Recommended that the pt rest and avoid heavy exertion activities. Followup in one day for blood pressure recheck.   -     metoprolol succinate (TOPROL-XL) 25 MG 24 hr tablet; Take 1 tablet (25 mg) by mouth daily  -     Basic metabolic panel  (Ca, Cl, CO2, Creat, Gluc, K, Na, BUN)  -     amLODIPine (NORVASC) 5 MG tablet; Take 2 tablets (10 mg) by mouth daily    Return in about 1 day (around 6/26/2018) for BP recheck.    Rome Perdue MD  [facility] [place]    The information in this document, created by the medical scribe for me, accurately reflects the services I personally performed and the decisions made by me. I have reviewed and approved this document for accuracy prior to leaving the patient care area.    1:35 PM, " 06/25/18

## 2018-06-26 ENCOUNTER — ALLIED HEALTH/NURSE VISIT (OUTPATIENT)
Dept: NURSING | Facility: CLINIC | Age: 68
End: 2018-06-26
Payer: MEDICARE

## 2018-06-26 VITALS — SYSTOLIC BLOOD PRESSURE: 175 MMHG | HEART RATE: 72 BPM | DIASTOLIC BLOOD PRESSURE: 76 MMHG

## 2018-06-26 DIAGNOSIS — I10 HTN (HYPERTENSION): Primary | ICD-10-CM

## 2018-06-26 LAB
ANION GAP SERPL CALCULATED.3IONS-SCNC: 12 MMOL/L (ref 3–14)
BUN SERPL-MCNC: 58 MG/DL (ref 7–30)
CALCIUM SERPL-MCNC: 9.7 MG/DL (ref 8.5–10.1)
CHLORIDE SERPL-SCNC: 107 MMOL/L (ref 94–109)
CO2 SERPL-SCNC: 22 MMOL/L (ref 20–32)
CREAT SERPL-MCNC: 1.87 MG/DL (ref 0.52–1.04)
GFR SERPL CREATININE-BSD FRML MDRD: 27 ML/MIN/1.7M2
GLUCOSE SERPL-MCNC: 168 MG/DL (ref 70–99)
POTASSIUM SERPL-SCNC: 4.7 MMOL/L (ref 3.4–5.3)
SODIUM SERPL-SCNC: 141 MMOL/L (ref 133–144)

## 2018-06-26 PROCEDURE — 99207 ZZC NO CHARGE NURSE ONLY: CPT

## 2018-06-26 NOTE — PROGRESS NOTES
Note to Staff: please call the patient to explain results.    -Kidney function (GFR) is decreased but improving.  ADVISE: rechecking this in 2-4 days  -Sodium is normal.  -Potassium is normal.  -Glucose is slight elevated but this may not have been a fasting sample.     For additional lab test information, labtestsonline.org is an excellent reference.

## 2018-06-26 NOTE — MR AVS SNAPSHOT
After Visit Summary   6/26/2018    Miroslava Handy    MRN: [id number]           Patient Information     Date Of Birth          1950        Visit Information        Provider Department      6/26/2018 3:00 PM RV ANTICOAGULATION CLINIC [facility]        Today's Diagnoses     HTN (hypertension)    -  1       Follow-ups after your visit        Your next 10 appointments already scheduled     Jun 28, 2018 11:40 AM CDT   Office Visit with Rome Perdue MD   [facility] ([facility])    [address] [place] [id number] [phone]           Bring a current list of meds and any records pertaining to this visit. For Physicals, please bring immunization records and any forms needing to be filled out. Please arrive 10 minutes early to complete paperwork.              Who to contact     If you have questions or need follow up information about today's clinic visit or your schedule please contact [facility] directly at [phone].  Normal or non-critical lab and imaging results will be communicated to you by MyChart, letter or phone within 4 business days after the clinic has received the results. If you do not hear from us within 7 days, please contact the clinic through MyChart or phone. If you have a critical or abnormal lab result, we will notify you by phone as soon as possible.  Submit refill requests through Fancred or call your pharmacy and they will forward the refill request to us. Please allow 3 business days for your refill to be completed.          Additional Information About Your Visit        Care EveryWhere ID     This is your Care EveryWhere ID. This could be used by other organizations to access your [facility] medical records  [id number]        Your Vitals Were     Pulse                   72            Blood Pressure from Last 3 Encounters:   06/26/18 175/76   06/25/18 (!) 203/102   06/25/18 (!)  214/111    Weight from Last 3 Encounters:   06/25/18 184 lb (83.5 kg)   06/25/18 184 lb (83.5 kg)   06/20/18 194 lb (88 kg)              Today, you had the following     No orders found for display       Primary Care Provider Office Phone # Fax #    Rome Perdue [phone]       [address] [place] [address]        Equal Access to Services     FILIPPO MYERS : Hadii aad ku hadasho Soomaali, waaxda luqadaha, qaybta kaalmada adeegyada, waxay idiin hayaan adeeg kharachelsea lajayme . So [facility] [phone].    ATENCIÓN: Si habla español, tiene a ramirez disposición servicios gratuitos de asistencia lingüística. [facility] [phone].    We comply with applicable federal civil rights laws and [place] laws. We do not discriminate on the basis of race, color, national origin, age, disability, sex, sexual orientation, or gender identity.            Thank you!     Thank you for choosing [facility]  for your care. Our goal is always to provide you with excellent care. Hearing back from our patients is one way we can continue to improve our services. Please take a few minutes to complete the written survey that you may receive in the mail after your visit with us. Thank you!             Your Updated Medication List - Protect others around you: Learn how to safely use, store and throw away your medicines at www.disposemymeds.org.          This list is accurate as of 6/26/18  3:42 PM.  Always use your most recent med list.                   Brand Name Dispense Instructions for use Diagnosis    amLODIPine 5 MG tablet    NORVASC    180 tablet    Take 2 tablets (10 mg) by mouth daily    Hypertension goal BP (blood pressure) < 130/80       aspirin 81 MG tablet     100    ONE DAILY    Hypertension       metoprolol succinate 25 MG 24 hr tablet    TOPROL-XL    90 tablet    Take 1 tablet (25 mg) by mouth daily    Hypertension goal BP (blood pressure) < 130/80       triamterene-hydrochlorothiazide  37.5-25 MG per capsule    DYAZIDE    90 capsule    Take 1 capsule by mouth daily    Hypertension goal BP (blood pressure) < 130/80

## 2018-06-26 NOTE — PROGRESS NOTES
Hypertension Follow-up      Outpatient blood pressures are being checked at home.  Results are systolic over 200 yesterday.    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 Normally minimal stress.   Exercise- above  Salt- above  Caffeine- green tea one cup    Denies vision changes but needs new glasses  No headaches until BP cuff is pumping up  No NV    No dizziness      Slight chest tightness/anxiety and Fatigue today due to stress of appt and worrying about their results.     190/80 P 75  176/76 P 76    Pt will see RL this Thursday for a recheck. RL advised to keep on same medication regimen until then and call with any sx. The patient indicates understanding of these issues and agrees with the plan.    Yumiko Nash RN  [facility]

## 2018-06-27 NOTE — PROGRESS NOTES
"  SUBJECTIVE:                                                    Miroslava Handy is a 68 year old female who presents to clinic today for the following health issues:     Follow up Labs, LOV-6/25/18    Pt was seen on 6/25/18 for evaluation of elevated BP. She started amlodipine 5 mg on 6/22 and this was increased to 10mg on 6/25. She also started on metoprolol 25 mg daily at last visit.   She has been drinking more water and reports quitting tobacco use. Urine is light yellow.    BP Readings from Last 3 Encounters:   06/28/18 150/70   06/26/18 175/76   06/25/18 (!) 203/102     Mammogram- Mammogram performed on 6/21/18. Results were normal.     Screen for colon cancer- fecal colorectal cancer screen performed on 6/17/18, results were normal.     Problem list and histories reviewed & adjusted, as indicated.  Additional history: as documented    ROS:  Constitutional, HEENT, cardiovascular, pulmonary, GI, , musculoskeletal, neuro, skin, endocrine and psych systems are negative, except as otherwise noted.    This document serves as a record of the services and decisions personally performed and made by Rome Perdue MD. It was created on her behalf by Dalia Lang, a trained medical scribe. The creation of this document is based the provider's statements to the medical scribe.  Reese Lang 12:07 PM, June 28, 2018    OBJECTIVE:                                                    /70  Pulse 90  Temp 97.8  F (36.6  C) (Oral)  Ht 1.702 m (5' 7\")  Wt 83.5 kg (184 lb)  SpO2 96%  BMI 28.82 kg/m2 Body mass index is 28.82 kg/(m^2).     GENERAL: healthy, alert, well nourished, well hydrated, no distress  RESP: lungs clear to auscultation - no rales, no rhonchi, no wheezes  CV: regular rates and rhythm, normal S1 S2, no S3 or S4 and no murmur, no click or rub -  MS: extremities- no gross deformities noted, no edema  SKIN: no suspicious lesions, no rashes    Diagnostic test results:  Results for orders " "placed or performed in visit on 06/25/18   Basic metabolic panel  (Ca, Cl, CO2, Creat, Gluc, K, Na, BUN)   Result Value Ref Range    Sodium 141 133 - 144 mmol/L    Potassium 4.7 3.4 - 5.3 mmol/L    Chloride 107 94 - 109 mmol/L    Carbon Dioxide 22 20 - 32 mmol/L    Anion Gap 12 3 - 14 mmol/L    Glucose 168 (H) 70 - 99 mg/dL    Urea Nitrogen 58 (H) 7 - 30 mg/dL    Creatinine 1.87 (H) 0.52 - 1.04 mg/dL    GFR Estimate 27 (L) >60 mL/min/1.7m2    GFR Estimate If Black 33 (L) >60 mL/min/1.7m2    Calcium 9.7 8.5 - 10.1 mg/dL        ASSESSMENT/PLAN:         Miroslava was seen today for recheck.    Diagnoses and all orders for this visit:    Hypertension goal BP (blood pressure) < 130/80  BP is still slightly elevated today, but it is improving. Will increase dose of metoprolol to 50 mg daily (2 tablets per day). She may resume exercise. Advised healthy diet.   -     Basic metabolic panel; Future  -     metoprolol succinate (TOPROL-XL) 25 MG 24 hr tablet; Take 2 tablets (50 mg) by mouth daily    Hyperlipidemia LDL goal <100  -     Lipid panel reflex to direct LDL Fasting; Future    Tobacco use disorder - quit 6/12/18    Screen for colon cancer  Fecal colon cancer screen performed 6/17/18- results were normal.     Risks, benefits and alternatives of treatments discussed. Plan agreed on.       Followup: Return in about 2 weeks (around 7/12/2018) for Lab only appointmnet, Blood Pressure Recheck.    See patient instructions.       Tobacco Cessation:   reports that she quit smoking about 2 weeks ago. Her smoking use included Cigarettes. She has a 27.90 pack-year smoking history. She has never used smokeless tobacco.    BMI:   Estimated body mass index is 28.82 kg/(m^2) as calculated from the following:    Height as of this encounter: 1.702 m (5' 7\").    Weight as of this encounter: 83.5 kg (184 lb).   Weight management plan: Discussed healthy diet and exercise guidelines and patient will follow up in 6 months in clinic to " re-evaluate.     The information in this document, created by the medical scribe for me, accurately reflects the services I personally performed and the decisions made by me. I have reviewed and approved this document for accuracy prior to leaving the patient care area.  June 28, 2018 12:30 PM          Marcial Perdue MD   Pager: [phone]

## 2018-06-28 ENCOUNTER — OFFICE VISIT (OUTPATIENT)
Dept: FAMILY MEDICINE | Facility: CLINIC | Age: 68
End: 2018-06-28
Payer: MEDICARE

## 2018-06-28 VITALS
HEART RATE: 90 BPM | OXYGEN SATURATION: 96 % | TEMPERATURE: 97.8 F | HEIGHT: 67 IN | WEIGHT: 184 LBS | SYSTOLIC BLOOD PRESSURE: 150 MMHG | BODY MASS INDEX: 28.88 KG/M2 | DIASTOLIC BLOOD PRESSURE: 70 MMHG

## 2018-06-28 DIAGNOSIS — Z12.11 SCREEN FOR COLON CANCER: ICD-10-CM

## 2018-06-28 DIAGNOSIS — I10 HYPERTENSION GOAL BP (BLOOD PRESSURE) < 130/80: Primary | ICD-10-CM

## 2018-06-28 DIAGNOSIS — F17.200 TOBACCO USE DISORDER: ICD-10-CM

## 2018-06-28 DIAGNOSIS — E78.5 HYPERLIPIDEMIA LDL GOAL <100: ICD-10-CM

## 2018-06-28 PROCEDURE — 99213 OFFICE O/P EST LOW 20 MIN: CPT | Performed by: FAMILY MEDICINE

## 2018-06-28 RX ORDER — METOPROLOL SUCCINATE 25 MG/1
50 TABLET, EXTENDED RELEASE ORAL DAILY
Qty: 90 TABLET | Refills: 1 | Status: SHIPPED | OUTPATIENT
Start: 2018-06-28 | End: 2018-09-18

## 2018-06-28 NOTE — MR AVS SNAPSHOT
After Visit Summary   6/28/2018    Miroslava Handy    MRN: [id number]           Patient Information     Date Of Birth          1950        Visit Information        Provider Department      6/28/2018 11:40 AM Rome Perdue MD [facility]        Today's Diagnoses     Hypertension goal BP (blood pressure) < 130/80    -  1    Hyperlipidemia LDL goal <100        Tobacco use disorder - quit 6/12/18        Screen for colon cancer           Follow-ups after your visit        Follow-up notes from your care team     Return in about 2 weeks (around 7/12/2018) for Lab only appointmnet, Blood Pressure Recheck.      Future tests that were ordered for you today     Open Future Orders        Priority Expected Expires Ordered    Basic metabolic panel Routine 7/28/2018 7/28/2018 6/28/2018    Lipid panel reflex to direct LDL Fasting Routine 7/28/2018 7/28/2018 6/28/2018            Who to contact     If you have questions or need follow up information about today's clinic visit or your schedule please contact [facility] directly at [phone].  Normal or non-critical lab and imaging results will be communicated to you by MyChart, letter or phone within 4 business days after the clinic has received the results. If you do not hear from us within 7 days, please contact the clinic through MyChart or phone. If you have a critical or abnormal lab result, we will notify you by phone as soon as possible.  Submit refill requests through [employer] or call your pharmacy and they will forward the refill request to us. Please allow 3 business days for your refill to be completed.          Additional Information About Your Visit        Care EveryWhere ID     This is your Care EveryWhere ID. This could be used by other organizations to access your [place] medical records  [id number]        Your Vitals Were     Pulse Temperature Height Pulse Oximetry BMI (Body Mass Index)       90 97.8  F  "(36.6  C) (Oral) 5' 7\" (1.702 m) 96% 28.82 kg/m2        Blood Pressure from Last 3 Encounters:   06/28/18 150/70   06/26/18 175/76   06/25/18 (!) 203/102    Weight from Last 3 Encounters:   06/28/18 184 lb (83.5 kg)   06/25/18 184 lb (83.5 kg)   06/25/18 184 lb (83.5 kg)                 Today's Medication Changes          These changes are accurate as of 6/28/18 12:14 PM.  If you have any questions, ask your nurse or doctor.               These medicines have changed or have updated prescriptions.        Dose/Directions    metoprolol succinate 25 MG 24 hr tablet   Commonly known as:  TOPROL-XL   This may have changed:  how much to take   Used for:  Hypertension goal BP (blood pressure) < 130/80   Changed by:  Rome Perdue MD        Dose:  50 mg   Take 2 tablets (50 mg) by mouth daily   Quantity:  90 tablet   Refills:  1            Where to get your medicines      These medications were sent to [facility] [address]     Phone:  [phone]     metoprolol succinate 25 MG 24 hr tablet                Primary Care Provider Office Phone # Fax #    Rome Perdue -[phone]       [address] [place], [place] [id number]        Equal Access to Services     [facility]: Hadii merlyn ku hadasho Soomaali, waaxda luqadaha, qaybta kaalmada adeegyada, rodger mcbride. So [facility] [phone].    ATENCIÓN: Si habla español, tiene a ramirez disposición servicios gratuitos de asistencia lingüística. Gunjaname al [phone].    We comply with applicable federal civil rights laws and [place] laws. We do not discriminate on the basis of race, color, national origin, age, disability, sex, sexual orientation, or gender identity.            Thank you!     Thank you for choosing [facility]  for your care. Our goal is always to provide you with excellent care. Hearing back from our " patients is one way we can continue to improve our services. Please take a few minutes to complete the written survey that you may receive in the mail after your visit with us. Thank you!             Your Updated Medication List - Protect others around you: Learn how to safely use, store and throw away your medicines at www.disposemymeds.org.          This list is accurate as of 6/28/18 12:14 PM.  Always use your most recent med list.                   Brand Name Dispense Instructions for use Diagnosis    amLODIPine 5 MG tablet    NORVASC    180 tablet    Take 2 tablets (10 mg) by mouth daily    Hypertension goal BP (blood pressure) < 130/80       aspirin 81 MG tablet     100    ONE DAILY    Hypertension       metoprolol succinate 25 MG 24 hr tablet    TOPROL-XL    90 tablet    Take 2 tablets (50 mg) by mouth daily    Hypertension goal BP (blood pressure) < 130/80       triamterene-hydrochlorothiazide 37.5-25 MG per capsule    DYAZIDE    90 capsule    Take 1 capsule by mouth daily    Hypertension goal BP (blood pressure) < 130/80

## 2018-07-12 ENCOUNTER — ALLIED HEALTH/NURSE VISIT (OUTPATIENT)
Dept: FAMILY MEDICINE | Facility: CLINIC | Age: 68
End: 2018-07-12
Payer: COMMERCIAL

## 2018-07-12 VITALS — DIASTOLIC BLOOD PRESSURE: 62 MMHG | SYSTOLIC BLOOD PRESSURE: 153 MMHG

## 2018-07-12 DIAGNOSIS — E78.5 HYPERLIPIDEMIA LDL GOAL <100: ICD-10-CM

## 2018-07-12 DIAGNOSIS — Z01.30 BP CHECK: Primary | ICD-10-CM

## 2018-07-12 DIAGNOSIS — I10 HYPERTENSION GOAL BP (BLOOD PRESSURE) < 130/80: ICD-10-CM

## 2018-07-12 LAB
ANION GAP SERPL CALCULATED.3IONS-SCNC: 9 MMOL/L (ref 3–14)
BUN SERPL-MCNC: 46 MG/DL (ref 7–30)
CALCIUM SERPL-MCNC: 8.7 MG/DL (ref 8.5–10.1)
CHLORIDE SERPL-SCNC: 102 MMOL/L (ref 94–109)
CHOLEST SERPL-MCNC: 233 MG/DL
CO2 SERPL-SCNC: 27 MMOL/L (ref 20–32)
CREAT SERPL-MCNC: 1.7 MG/DL (ref 0.52–1.04)
CREAT UR-MCNC: 106 MG/DL
GFR SERPL CREATININE-BSD FRML MDRD: 30 ML/MIN/1.7M2
GLUCOSE SERPL-MCNC: 134 MG/DL (ref 70–99)
HDLC SERPL-MCNC: 33 MG/DL
LDLC SERPL CALC-MCNC: 157 MG/DL
MICROALBUMIN UR-MCNC: 12 MG/L
MICROALBUMIN/CREAT UR: 11.04 MG/G CR (ref 0–25)
NONHDLC SERPL-MCNC: 200 MG/DL
POTASSIUM SERPL-SCNC: 3.7 MMOL/L (ref 3.4–5.3)
SODIUM SERPL-SCNC: 138 MMOL/L (ref 133–144)
TRIGL SERPL-MCNC: 216 MG/DL

## 2018-07-12 PROCEDURE — 82043 UR ALBUMIN QUANTITATIVE: CPT | Performed by: FAMILY MEDICINE

## 2018-07-12 PROCEDURE — 80048 BASIC METABOLIC PNL TOTAL CA: CPT | Performed by: FAMILY MEDICINE

## 2018-07-12 PROCEDURE — 99207 ZZC NO CHARGE NURSE ONLY: CPT | Performed by: FAMILY MEDICINE

## 2018-07-12 PROCEDURE — 36415 COLL VENOUS BLD VENIPUNCTURE: CPT | Performed by: FAMILY MEDICINE

## 2018-07-12 PROCEDURE — 80061 LIPID PANEL: CPT | Performed by: FAMILY MEDICINE

## 2018-07-12 NOTE — PROGRESS NOTES
Miroslava Handy is enrolled/participating in the retail pharmacy Blood Pressure Goals Achievement Program (BPGAP).  Miroslava Handy was evaluated at [facility] on July 12, 2018 at which time her blood pressure was:    BP Readings from Last 3 Encounters:   07/12/18 153/62   06/28/18 150/70   06/26/18 175/76     Reviewed lifestyle modifications for blood pressure control and reduction: including making healthy food choices, managing weight, getting regular exercise, smoking cessation, reducing alcohol consumption, monitoring blood pressure regularly.     Miroslava Handy is not experiencing symptoms.    Follow-Up: BP is not at goal of < 150/90 mmHg (patient 60+ years of age without diabetes), Recommended follow-up with PCP.  Routing to PCP for further review.    Recommendation to Provider: pt states she has not been at goal and was instructed to get her BP checked while her meds are being adjusted.  Recommend following up with patient to consider further adjustment to medication. Recommended patient to follow up with another BP check in 1 week.        Completed by: Alondra Stone, PharmD  [facility]  PH: [phone]

## 2018-07-12 NOTE — MR AVS SNAPSHOT
After Visit Summary   7/12/2018    Miroslava Handy    MRN: [id number]           Patient Information     Date Of Birth          1950        Visit Information        Provider Department      7/12/2018 10:30 AM Rome Perdue MD [facility]        Today's Diagnoses     BP check    -  1       Follow-ups after your visit        Who to contact     If you have questions or need follow up information about today's clinic visit or your schedule please contact [facility] directly at [phone].  Normal or non-critical lab and imaging results will be communicated to you by MyChart, letter or phone within 4 business days after the clinic has received the results. If you do not hear from us within 7 days, please contact the clinic through MyChart or phone. If you have a critical or abnormal lab result, we will notify you by phone as soon as possible.  Submit refill requests through [employer] or call your pharmacy and they will forward the refill request to us. Please allow 3 business days for your refill to be completed.          Additional Information About Your Visit        Care EveryWhere ID     This is your Care EveryWhere ID. This could be used by other organizations to access your [place] medical records  [id number]         Blood Pressure from Last 3 Encounters:   07/12/18 153/62   06/28/18 150/70   06/26/18 175/76    Weight from Last 3 Encounters:   06/28/18 184 lb (83.5 kg)   06/25/18 184 lb (83.5 kg)   06/25/18 184 lb (83.5 kg)              Today, you had the following     No orders found for display       Primary Care Provider Office Phone # Fax #    Rome Perdue -[phone]       [address] [place] [address]        Equal Access to Services     [facility]: Hadii merlyn Varela, wamicaelada luqpinky, qaybta rodger manuel . [facility] [phone].    ATENCIÓN: Si tank salazar,  tiene a ramirez disposición servicios gratuitos de asistencia lingüística. Brain aguirre [phone].    We comply with applicable federal civil rights laws and [place] laws. We do not discriminate on the basis of race, color, national origin, age, disability, sex, sexual orientation, or gender identity.            Thank you!     Thank you for choosing [facility]  for your care. Our goal is always to provide you with excellent care. Hearing back from our patients is one way we can continue to improve our services. Please take a few minutes to complete the written survey that you may receive in the mail after your visit with us. Thank you!             Your Updated Medication List - Protect others around you: Learn how to safely use, store and throw away your medicines at www.disposemymeds.org.          This list is accurate as of 7/12/18 10:33 AM.  Always use your most recent med list.                   Brand Name Dispense Instructions for use Diagnosis    amLODIPine 5 MG tablet    NORVASC    180 tablet    Take 2 tablets (10 mg) by mouth daily    Hypertension goal BP (blood pressure) < 130/80       aspirin 81 MG tablet     100    ONE DAILY    Hypertension       metoprolol succinate 25 MG 24 hr tablet    TOPROL-XL    90 tablet    Take 2 tablets (50 mg) by mouth daily    Hypertension goal BP (blood pressure) < 130/80       triamterene-hydrochlorothiazide 37.5-25 MG per capsule    DYAZIDE    90 capsule    Take 1 capsule by mouth daily    Hypertension goal BP (blood pressure) < 130/80

## 2018-07-12 NOTE — LETTER
[facility]  [address] [place] [address]   [place] [address]                  [phone]   July 18, 2018    Miroslava Hnady  [address] [place] [address]      Dear Miroslava,    Here is a summary of your recent test results:    Kidney function (GFR) is decreased.  ADVISE: recheck in 3 months (BMP, DX: 593.9 - unspecified disorder of kidney )   -Sodium is normal.   -Potassium is normal.   -Glucose is slight elevated and may be sign of early diabetes (prediabetes). ADVISE:: low carbohydrate diet, exercise, try to lose weight (if necessary) and recheck glucose in 3 months. (GLU,A1C, DX: prediabetes)   -LDL(bad) cholesterol level is elevated, HDL(good) cholesterol level is low and your triglycerides are elevated which can increase your heart disease risk.  A diet high in fat and simple carbohydrates, genetics and being overweight can contribute to this. ADVISE: Exercise, a low fat, low carbohydrate diet, weight control, and omega-3 fatty acids (fish oil) 9370-9190 mg daily are helpful to improve this.  Rechecking your fasting cholesterol panel in 3 months is recommended (Lipid w/ LDL reflex, DX: hyperlipidemia)   -Microalbumin (urine protein) test is normal.  ADVISE: recheck annually     For additional lab test information, labtestsonline.org is an excellent reference    Your test results are enclosed.      Please contact me if you have any questions.    In addition, here is a list of due or overdue Health Maintenance reminders.    Health Maintenance Due   Topic Date Due     Depression Assessment 2 - yearly  01/19/1964     Discuss Advance Directive Planning  01/19/2007     FALL RISK ASSESSMENT  01/19/2017     Bone Density Screening (Dexa)  01/19/2017       Please call us at [phone] (or use [employer]) to address the above recommendations.            Thank you very much for trusting [facility]..     Healthy regards,          Marcial Perdue M.D.        Results for  orders placed or performed in visit on 07/12/18   Lipid panel reflex to direct LDL Fasting   Result Value Ref Range    Cholesterol 233 (H) <200 mg/dL    Triglycerides 216 (H) <150 mg/dL    HDL Cholesterol 33 (L) >49 mg/dL    LDL Cholesterol Calculated 157 (H) <100 mg/dL    Non HDL Cholesterol 200 (H) <130 mg/dL   Basic metabolic panel  (Ca, Cl, CO2, Creat, Gluc, K, Na, BUN)   Result Value Ref Range    Sodium 138 133 - 144 mmol/L    Potassium 3.7 3.4 - 5.3 mmol/L    Chloride 102 94 - 109 mmol/L    Carbon Dioxide 27 20 - 32 mmol/L    Anion Gap 9 3 - 14 mmol/L    Glucose 134 (H) 70 - 99 mg/dL    Urea Nitrogen 46 (H) 7 - 30 mg/dL    Creatinine 1.70 (H) 0.52 - 1.04 mg/dL    GFR Estimate 30 (L) >60 mL/min/1.7m2    GFR Estimate If Black 36 (L) >60 mL/min/1.7m2    Calcium 8.7 8.5 - 10.1 mg/dL   Albumin Random Urine Quantitative with Creat Ratio   Result Value Ref Range    Creatinine Urine 106 mg/dL    Albumin Urine mg/L 12 mg/L    Albumin Urine mg/g Cr 11.04 0 - 25 mg/g Cr

## 2018-07-18 NOTE — PROGRESS NOTES
Note to Staff: please send a result letter    -Kidney function (GFR) is decreased.  ADVISE: recheck in 3 months (BMP, DX: 593.9 - unspecified disorder of kidney )  -Sodium is normal.  -Potassium is normal.  -Glucose is slight elevated and may be sign of early diabetes (prediabetes). ADVISE:: low carbohydrate diet, exercise, try to lose weight (if necessary) and recheck glucose in 3 months. (GLU,A1C, DX: prediabetes)  -LDL(bad) cholesterol level is elevated, HDL(good) cholesterol level is low and your triglycerides are elevated which can increase your heart disease risk.  A diet high in fat and simple carbohydrates, genetics and being overweight can contribute to this. ADVISE: Exercise, a low fat, low carbohydrate diet, weight control, and omega-3 fatty acids (fish oil) 2033-5907 mg daily are helpful to improve this.  Rechecking your fasting cholesterol panel in 3 months is recommended (Lipid w/ LDL reflex, DX: hyperlipidemia)  -Microalbumin (urine protein) test is normal.  ADVISE: recheck annually     For additional lab test information, labtestsonline.org is an excellent reference.

## 2018-08-10 ENCOUNTER — ALLIED HEALTH/NURSE VISIT (OUTPATIENT)
Dept: FAMILY MEDICINE | Facility: CLINIC | Age: 68
End: 2018-08-10
Payer: COMMERCIAL

## 2018-08-10 VITALS — DIASTOLIC BLOOD PRESSURE: 55 MMHG | SYSTOLIC BLOOD PRESSURE: 142 MMHG

## 2018-08-10 DIAGNOSIS — Z01.30 BP CHECK: Primary | ICD-10-CM

## 2018-08-10 PROCEDURE — 99207 ZZC NO CHARGE NURSE ONLY: CPT | Performed by: FAMILY MEDICINE

## 2018-08-10 NOTE — MR AVS SNAPSHOT
After Visit Summary   8/10/2018    Miroslava Handy    MRN: [id number]           Patient Information     Date Of Birth          1950        Visit Information        Provider Department      8/10/2018 4:25 PM Rome Perdue MD [facility]        Today's Diagnoses     BP check    -  1       Follow-ups after your visit        Who to contact     If you have questions or need follow up information about today's clinic visit or your schedule please contact [facility] directly at [phone].  Normal or non-critical lab and imaging results will be communicated to you by MyChart, letter or phone within 4 business days after the clinic has received the results. If you do not hear from us within 7 days, please contact the clinic through MyChart or phone. If you have a critical or abnormal lab result, we will notify you by phone as soon as possible.  Submit refill requests through "[employer] or call your pharmacy and they will forward the refill request to us. Please allow 3 business days for your refill to be completed.          Additional Information About Your Visit        Care EveryWhere ID     This is your Care EveryWhere ID. This could be used by other organizations to access your [place] medical records  [id number]         Blood Pressure from Last 3 Encounters:   08/10/18 142/55   07/12/18 153/62   06/28/18 150/70    Weight from Last 3 Encounters:   06/28/18 184 lb (83.5 kg)   06/25/18 184 lb (83.5 kg)   06/25/18 184 lb (83.5 kg)              Today, you had the following     No orders found for display       Primary Care Provider Office Phone # Fax #    Rome Perdue -[phone]       [address] [place] [address]        Equal Access to Services     [facility]: Hadii merlyn Varela, wamicaelada luqpinky, qaybta rodger manuel . [facility] [phone].    ATENCIÓN: Si tank salazar,  tiene a ramirez disposición servicios gratuitos de asistencia lingüística. Brain aguirre [phone].    We comply with applicable federal civil rights laws and [place] laws. We do not discriminate on the basis of race, color, national origin, age, disability, sex, sexual orientation, or gender identity.            Thank you!     Thank you for choosing [facility]  for your care. Our goal is always to provide you with excellent care. Hearing back from our patients is one way we can continue to improve our services. Please take a few minutes to complete the written survey that you may receive in the mail after your visit with us. Thank you!             Your Updated Medication List - Protect others around you: Learn how to safely use, store and throw away your medicines at www.disposemymeds.org.          This list is accurate as of 8/10/18  4:27 PM.  Always use your most recent med list.                   Brand Name Dispense Instructions for use Diagnosis    amLODIPine 5 MG tablet    NORVASC    180 tablet    Take 2 tablets (10 mg) by mouth daily    Hypertension goal BP (blood pressure) < 130/80       aspirin 81 MG tablet     100    ONE DAILY    Hypertension       metoprolol succinate 25 MG 24 hr tablet    TOPROL-XL    90 tablet    Take 2 tablets (50 mg) by mouth daily    Hypertension goal BP (blood pressure) < 130/80       triamterene-hydrochlorothiazide 37.5-25 MG per capsule    DYAZIDE    90 capsule    Take 1 capsule by mouth daily    Hypertension goal BP (blood pressure) < 130/80

## 2018-08-10 NOTE — PROGRESS NOTES
Miroslava Handy is enrolled/participating in the retail pharmacy Blood Pressure Goals Achievement Program (BPGAP).  Miroslava Handy was evaluated at [facility] on August 10, 2018 at which time her blood pressure was:    BP Readings from Last 3 Encounters:   08/10/18 142/55   07/12/18 153/62   06/28/18 150/70     Reviewed lifestyle modifications for blood pressure control and reduction: including making healthy food choices, managing weight, getting regular exercise, smoking cessation, reducing alcohol consumption, monitoring blood pressure regularly.     Miroslava Handy is not experiencing symptoms.    Follow-Up: BP is at goal of < 150/90 mmHg (patient 60+ years of age without diabetes).  Recommended follow-up at pharmacy in 6 months.     Recommendation to Provider: no change at this time. Routing as FYI.      Completed by: Alondra Stone, PharmD  [facility]  PH: [phone]

## 2018-09-18 DIAGNOSIS — I10 HYPERTENSION GOAL BP (BLOOD PRESSURE) < 130/80: ICD-10-CM

## 2018-09-18 RX ORDER — METOPROLOL SUCCINATE 25 MG/1
TABLET, EXTENDED RELEASE ORAL
Qty: 90 TABLET | Refills: 1 | OUTPATIENT
Start: 2018-09-18

## 2018-09-18 RX ORDER — METOPROLOL SUCCINATE 25 MG/1
50 TABLET, EXTENDED RELEASE ORAL DAILY
Qty: 180 TABLET | Refills: 0 | Status: SHIPPED | OUTPATIENT
Start: 2018-09-18 | End: 2018-11-06

## 2018-09-18 NOTE — TELEPHONE ENCOUNTER
pharmacy calling stating that this is only for 45 days these pills and will need a refill     Refill per RN protocol     Idania Reynolds RN, BSN  [facility]

## 2018-09-18 NOTE — TELEPHONE ENCOUNTER
"Requested Prescriptions   Pending Prescriptions Disp Refills     metoprolol succinate (TOPROL-XL) 25 MG 24 hr tablet [Pharmacy Med Name: METOPROLOL SUCCINATE ER 25MG TB24] 90 tablet 1      Last Written Prescription Date:  6.28.18  Last Fill Quantity: 90,  # refills: 1   Last Office Visit: 6/28/2018   Future Office Visit:      Sig: TAKE ONE TABLET BY MOUTH EVERY DAY    Beta-Blockers Protocol Failed    9/18/2018  9:51 AM       Failed - Blood pressure under 140/90 in past 12 months    BP Readings from Last 3 Encounters:   08/10/18 142/55   07/12/18 153/62   06/28/18 150/70                Passed - Patient is age 6 or older       Passed - Recent (12 mo) or future (30 days) visit within the authorizing provider's specialty    Patient had office visit in the last 12 months or has a visit in the next 30 days with authorizing provider or within the authorizing provider's specialty.  See \"Patient Info\" tab in inbasket, or \"Choose Columns\" in Meds & Orders section of the refill encounter.              "

## 2018-11-06 ENCOUNTER — OFFICE VISIT (OUTPATIENT)
Dept: FAMILY MEDICINE | Facility: CLINIC | Age: 68
End: 2018-11-06
Payer: MEDICARE

## 2018-11-06 VITALS
SYSTOLIC BLOOD PRESSURE: 150 MMHG | WEIGHT: 199 LBS | OXYGEN SATURATION: 99 % | BODY MASS INDEX: 31.23 KG/M2 | TEMPERATURE: 97.7 F | DIASTOLIC BLOOD PRESSURE: 86 MMHG | HEIGHT: 67 IN | HEART RATE: 85 BPM

## 2018-11-06 DIAGNOSIS — N17.9 AKI (ACUTE KIDNEY INJURY) (H): ICD-10-CM

## 2018-11-06 DIAGNOSIS — E78.5 HYPERLIPIDEMIA LDL GOAL <100: ICD-10-CM

## 2018-11-06 DIAGNOSIS — R60.0 LEG EDEMA: ICD-10-CM

## 2018-11-06 DIAGNOSIS — Z23 NEED FOR PROPHYLACTIC VACCINATION AND INOCULATION AGAINST INFLUENZA: ICD-10-CM

## 2018-11-06 DIAGNOSIS — I10 HYPERTENSION GOAL BP (BLOOD PRESSURE) < 130/80: Primary | ICD-10-CM

## 2018-11-06 PROCEDURE — 90662 IIV NO PRSV INCREASED AG IM: CPT | Performed by: FAMILY MEDICINE

## 2018-11-06 PROCEDURE — G0008 ADMIN INFLUENZA VIRUS VAC: HCPCS | Performed by: FAMILY MEDICINE

## 2018-11-06 PROCEDURE — 99214 OFFICE O/P EST MOD 30 MIN: CPT | Mod: 25 | Performed by: FAMILY MEDICINE

## 2018-11-06 RX ORDER — METOPROLOL SUCCINATE 50 MG/1
50 TABLET, EXTENDED RELEASE ORAL DAILY
Qty: 90 TABLET | Refills: 1 | Status: SHIPPED | OUTPATIENT
Start: 2018-11-06 | End: 2019-05-14

## 2018-11-06 RX ORDER — ATORVASTATIN CALCIUM 10 MG/1
10 TABLET, FILM COATED ORAL DAILY
Qty: 90 TABLET | Refills: 1 | Status: SHIPPED | OUTPATIENT
Start: 2018-11-06 | End: 2018-12-03

## 2018-11-06 RX ORDER — AMLODIPINE BESYLATE 10 MG/1
10 TABLET ORAL DAILY
Qty: 90 TABLET | Refills: 1 | Status: SHIPPED | OUTPATIENT
Start: 2018-11-06 | End: 2019-05-14

## 2018-11-06 RX ORDER — LOSARTAN POTASSIUM 25 MG/1
25 TABLET ORAL DAILY
Qty: 90 TABLET | Refills: 1 | Status: SHIPPED | OUTPATIENT
Start: 2018-11-06 | End: 2019-11-04

## 2018-11-06 NOTE — MR AVS SNAPSHOT
"              After Visit Summary   11/6/2018    Miroslava Handy    MRN: [id number]           Patient Information     Date Of Birth          1950        Visit Information        Provider Department      11/6/2018 2:20 PM Rome Perdue MD [facility]        Today's Diagnoses     Hypertension goal BP (blood pressure) < 130/80    -  1    Leg edema        Need for prophylactic vaccination and inoculation against influenza        h/o SIRIA (acute kidney injury) (H)        Hyperlipidemia LDL goal <100           Follow-ups after your visit        Follow-up notes from your care team     Return in about 1 month (around 12/6/2018) for Medication Recheck.      Future tests that were ordered for you today     Open Future Orders        Priority Expected Expires Ordered    US Renal Complete w Doppler Complete Routine  11/6/2019 11/6/2018    Lipid panel reflex to direct LDL Fasting Routine 1/7/2019 2/7/2019 11/6/2018            Who to contact     If you have questions or need follow up information about today's clinic visit or your schedule please contact [facility] directly at [phone].  Normal or non-critical lab and imaging results will be communicated to you by MyChart, letter or phone within 4 business days after the clinic has received the results. If you do not hear from us within 7 days, please contact the clinic through [employer] or phone. If you have a critical or abnormal lab result, we will notify you by phone as soon as possible.  Submit refill requests through [employer] or call your pharmacy and they will forward the refill request to us. Please allow 3 business days for your refill to be completed.          Additional Information About Your Visit        MyChart Information     [employer] lets you send messages to your doctor, view your test results, renew your prescriptions, schedule appointments and more. To sign up, go to [email or web address] . Click on \"Log in\" on the " "left side of the screen, which will take you to the Welcome page. Then click on \"Sign up Now\" on the right side of the page.     You will be asked to enter the access code listed below, as well as some personal information. Please follow the directions to create your username and password.     Your access code is: [email or web address]  Expires: 2019  3:14 PM     Your access code will  in 90 days. If you need help or a new code, please call your [place] clinic or [phone].        Care EveryWhere ID     This is your Care EveryWhere ID. This could be used by other organizations to access your [place] medical records  [id number]        Your Vitals Were     Pulse Temperature Height Pulse Oximetry BMI (Body Mass Index)       85 97.7  F (36.5  C) (Oral) 5' 7\" (1.702 m) 99% 31.17 kg/m2        Blood Pressure from Last 3 Encounters:   18 150/86   08/10/18 142/55   18 153/62    Weight from Last 3 Encounters:   18 199 lb (90.3 kg)   18 184 lb (83.5 kg)   18 184 lb (83.5 kg)              We Performed the Following          ADMIN VACCINE, FIRST [91752]     Basic metabolic panel  (Ca, Cl, CO2, Creat, Gluc, K, Na, BUN)     FLU VACCINE, INCREASED ANTIGEN, PRESV FREE, AGE 65+ [75766]          Today's Medication Changes          These changes are accurate as of 18  3:14 PM.  If you have any questions, ask your nurse or doctor.               Start taking these medicines.        Dose/Directions    atorvastatin 10 MG tablet   Commonly known as:  LIPITOR   Used for:  Hyperlipidemia LDL goal <100   Started by:  Rome Perdue MD        Dose:  10 mg   Take 1 tablet (10 mg) by mouth daily   Quantity:  90 tablet   Refills:  1       losartan 25 MG tablet   Commonly known as:  COZAAR   Used for:  Hypertension goal BP (blood pressure) < 130/80   Started by:  Rome Perdue MD        Dose:  25 mg   Take 1 tablet (25 mg) by mouth daily   Quantity:  90 tablet   Refills:  1         These medicines " have changed or have updated prescriptions.        Dose/Directions    amLODIPine 10 MG tablet   Commonly known as:  NORVASC   This may have changed:  medication strength   Used for:  Hypertension goal BP (blood pressure) < 130/80   Changed by:  Rome Perdue MD        Dose:  10 mg   Take 1 tablet (10 mg) by mouth daily   Quantity:  90 tablet   Refills:  1       metoprolol succinate 50 MG 24 hr tablet   Commonly known as:  TOPROL-XL   This may have changed:  medication strength   Used for:  Hypertension goal BP (blood pressure) < 130/80   Changed by:  Rome Perdue MD        Dose:  50 mg   Take 1 tablet (50 mg) by mouth daily   Quantity:  90 tablet   Refills:  1            Where to get your medicines      These medications were sent to [facility] [place] [address]     Phone:  [phone]     amLODIPine 10 MG tablet    atorvastatin 10 MG tablet    metoprolol succinate 50 MG 24 hr tablet         Some of these will need a paper prescription and others can be bought over the counter.  Ask your nurse if you have questions.     Bring a paper prescription for each of these medications     losartan 25 MG tablet                Primary Care Provider Office Phone # Fax #    Rome Perdue [phone]       [address] [place] [address]        Equal Access to Services     RADHA MYERS AH: Hadii merlyn delarosa hadasho Soomaali, waaxda luqadaha, qaybta kaalmada adeegyada, waxay idiin hayabraham mcbride. So [facility] [phone].    ATENCIÓN: Si habla español, tiene a ramirez disposición servicios gratuitos de asistencia lingüística. [facility] [phone].    We comply with applicable federal civil rights laws and [place] laws. We do not discriminate on the basis of race, color, national origin, age, disability, sex, sexual orientation, or gender identity.            Thank you!     Thank you for choosing  [facility]  for your care. Our goal is always to provide you with excellent care. Hearing back from our patients is one way we can continue to improve our services. Please take a few minutes to complete the written survey that you may receive in the mail after your visit with us. Thank you!             Your Updated Medication List - Protect others around you: Learn how to safely use, store and throw away your medicines at www.disposemymeds.org.          This list is accurate as of 11/6/18  3:14 PM.  Always use your most recent med list.                   Brand Name Dispense Instructions for use Diagnosis    amLODIPine 10 MG tablet    NORVASC    90 tablet    Take 1 tablet (10 mg) by mouth daily    Hypertension goal BP (blood pressure) < 130/80       aspirin 81 MG tablet     100    ONE DAILY    Hypertension       atorvastatin 10 MG tablet    LIPITOR    90 tablet    Take 1 tablet (10 mg) by mouth daily    Hyperlipidemia LDL goal <100       losartan 25 MG tablet    COZAAR    90 tablet    Take 1 tablet (25 mg) by mouth daily    Hypertension goal BP (blood pressure) < 130/80       metoprolol succinate 50 MG 24 hr tablet    TOPROL-XL    90 tablet    Take 1 tablet (50 mg) by mouth daily    Hypertension goal BP (blood pressure) < 130/80       triamterene-hydrochlorothiazide 37.5-25 MG per capsule    DYAZIDE    90 capsule    Take 1 capsule by mouth daily    Hypertension goal BP (blood pressure) < 130/80

## 2018-11-06 NOTE — PROGRESS NOTES
SUBJECTIVE:                                                      Miroslava Handy is a 68 year old female who presents to clinic today for the following health issues:    Hypertension Follow-up      For the past few days she has also noticed leg swelling bilaterally, but worse on the right.  There is no pain, numbness, motor, or sensory loss.     She is currently taking 3 BP medications and her BP is not well controlled.  She has no symptoms of HTN including headaches, and blurry vision.    She has recently dealing with the stress of her daughter having colon cancer. In that time period of 2 months she has not been eating great and eats a lot of salt and crackers. She has gained around 10 lbs.    On 6/11 she was seen in the ED for an SIRIA    Outpatient blood pressures are not being checked.    Low Salt Diet: no added salt    Recent Labs   Lab Test  07/12/18   0845   CHOL  233*   HDL  33*   LDL  157*   TRIG  216*     Last Comprehensive Metabolic Panel:  Sodium   Date Value Ref Range Status   07/12/2018 138 133 - 144 mmol/L Final     Potassium   Date Value Ref Range Status   07/12/2018 3.7 3.4 - 5.3 mmol/L Final     Chloride   Date Value Ref Range Status   07/12/2018 102 94 - 109 mmol/L Final     Carbon Dioxide   Date Value Ref Range Status   07/12/2018 27 20 - 32 mmol/L Final     Anion Gap   Date Value Ref Range Status   07/12/2018 9 3 - 14 mmol/L Final     Glucose   Date Value Ref Range Status   07/12/2018 134 (H) 70 - 99 mg/dL Final     Comment:     Fasting specimen     Urea Nitrogen   Date Value Ref Range Status   07/12/2018 46 (H) 7 - 30 mg/dL Final     Creatinine   Date Value Ref Range Status   07/12/2018 1.70 (H) 0.52 - 1.04 mg/dL Final     GFR Estimate   Date Value Ref Range Status   07/12/2018 30 (L) >60 mL/min/1.7m2 Final     Comment:     Non  GFR Calc     Calcium   Date Value Ref Range Status   07/12/2018 8.7 8.5 - 10.1 mg/dL Final     Problem list and histories reviewed & adjusted, as  "indicated.  Additional history: as documented    ROS:  Constitutional, HEENT, cardiovascular, pulmonary, GI, , musculoskeletal, neuro, skin, endocrine and psych systems are negative, except as otherwise noted.    OBJECTIVE:                                                      /86  Pulse 85  Temp 97.7  F (36.5  C) (Oral)  Ht 5' 7\" (1.702 m)  Wt 199 lb (90.3 kg)  SpO2 99%  BMI 31.17 kg/m2 Body mass index is 31.17 kg/(m^2).   GENERAL: healthy, alert, well nourished, well hydrated, no distress  HENT: ear canals- normal; TMs- normal; Nose- normal; Mouth- no ulcers, no lesions  NECK: no tenderness, no adenopathy, no asymmetry, no masses, no stiffness; thyroid- normal to palpation  RESP: lungs clear to auscultation - no rales, no rhonchi, no wheezes  CV: regular rates and rhythm, normal S1 S2, no S3 or S4 and no murmur.  ABDOMEN: No abdominal bruit. Soft, no tenderness, no  hepatosplenomegaly, no masses, normal bowel sounds  MS: Bilateral lower extremity edema 1+, not pitting.  NEURO: strength and tone- normal, sensory exam- grossly normal    ASSESSMENT/PLAN:                                                      Miroslava was seen today for recheck medication.    Diagnoses and all orders for this visit:    Hypertension goal BP (blood pressure) < 130/80: Recheck /77.  She is not well controlled on three medications and may have a secondary cause of HTN.  Her Cr has been elevated in the Summer and LUNA could lead to secondary HTN. If negative, will reevaluate for other causes of secondary HTN and discuss more lifestyle interventions.  -     Basic metabolic panel  (Ca, Cl, CO2, Creat, Gluc, K, Na, BUN)  -     amLODIPine (NORVASC) 10 MG tablet; Take 1 tablet (10 mg) by mouth daily  -     US Renal Complete w Doppler Complete; Future  -     metoprolol succinate (TOPROL-XL) 50 MG 24 hr tablet; Take 1 tablet (50 mg) by mouth daily  -     losartan (COZAAR) 25 MG tablet; Take 1 tablet (25 mg) by mouth daily    Leg " edema: likely due to HTN and fluid overload  -     Basic metabolic panel  (Ca, Cl, CO2, Creat, Gluc, K, Na, BUN)    Need for prophylactic vaccination and inoculation against influenza  -     FLU VACCINE, INCREASED ANTIGEN, PRESV FREE, AGE 65+ [52095]  -          ADMIN VACCINE, FIRST [47014]    h/o SIRIA (acute kidney injury) (H): On 6/20 her Cr was 2.5 and down trended to 1.7 on 7/12, which is still high.  Her BP was above 240 systolic and her malignant HTN could have caused this SIRIA.  A high Cr makes LUNA more likely and will recheck BMP to see if losartan can be used or not.  -     Basic metabolic panel  (Ca, Cl, CO2, Creat, Gluc, K, Na, BUN)    Hyperlipidemia LDL goal <100: Her 10 year ASCVD is 14.5% calculated using the poled-chort equation, thus a high intensity statin is indicated.  Her lipid panel on 7/12 was elevated.  -     atorvastatin (LIPITOR) 10 MG tablet; Take 1 tablet (10 mg) by mouth daily  -     Lipid panel reflex to direct LDL Fasting; Future    Other orders  -     Cancel: DEXA HIP/PELVIS/SPINE - Future; Future      Risks, benefits and alternatives of treatments discussed. Plan agreed on.      Followup: Return in about 1 month (around 12/6/2018) for Medication Recheck.    See patient instructions.         Leonides Parrish, MS3 Student,  has participated in the care of this patient.     Provider Disclosure:  I agree with above History, Review of Systems, Physical exam and Plan.  I have reviewed the content of the documentation and have edited it as needed. I have personally performed the services documented here and the documentation accurately represents those services and the decisions I have made.      Electronically signed by:          Marcial Perdue M.D.

## 2018-11-16 ENCOUNTER — ALLIED HEALTH/NURSE VISIT (OUTPATIENT)
Dept: FAMILY MEDICINE | Facility: CLINIC | Age: 68
End: 2018-11-16
Payer: MEDICARE

## 2018-11-16 VITALS — DIASTOLIC BLOOD PRESSURE: 70 MMHG | SYSTOLIC BLOOD PRESSURE: 128 MMHG

## 2018-11-16 DIAGNOSIS — I10 HYPERTENSION GOAL BP (BLOOD PRESSURE) < 130/80: Primary | ICD-10-CM

## 2018-11-16 PROCEDURE — 99207 ZZC NO CHARGE NURSE ONLY: CPT | Performed by: FAMILY MEDICINE

## 2018-11-16 NOTE — MR AVS SNAPSHOT
After Visit Summary   11/16/2018    Miroslava Handy    MRN: [id number]           Patient Information     Date Of Birth          1950        Visit Information        Provider Department      11/16/2018 3:11 PM Rome Perdue MD [facility]        Today's Diagnoses     Hypertension goal BP (blood pressure) < 130/80    -  1       Follow-ups after your visit        Your next 10 appointments already scheduled     Nov 26, 2018  8:00 AM CST   US RENAL COMPLETE WITH DUPLEX COMPLETE with [email or web address]   [facility] ([facility])    [address]  [facility] [address] [phone]           How do I prepare for my exam? (Food and drink instructions) Adults: No eating, smoking, gum chewing or drinking for 8 hours before the exam. You may take medicine with a small sip of water.  Children: * Infants, breast-fed: may have breast milk up to 2 hours before exam. * Infants, formula: may have bottle until 4 hours before exam. * Children 1-5 years: No food or drink for 4 hours before exam. * Children 6 -12 years: No food or drink for 6 hours before exam. * Children over 12 years: No food or drink for 8 hours before exam.  * J Tube Fed: No need to stop feedings.  What should I wear: Wear comfortable clothes.  How long does the exam take: Most ultrasounds take 30 to 60 minutes.  What should I bring: Bring a list of your medicines, including vitamins, minerals and over-the-counter drugs. It is safest to leave personal items at home.  Do I need a :  No  is needed.  What do I need to tell my doctor: Tell your doctor about any allergies you may have.  What should I do after the exam: No restrictions, You may resume normal activities.  What is this test: An ultrasound uses sound waves to make pictures of the body. Sound waves do not cause pain. The only discomfort may be the pressure of the wand against your skin or full bladder.   "Who should I call with questions: If you have any questions, please call the Imaging Department where you will have your exam. Directions, parking instructions, and other information is available on our website, [email or web address].            Dec 03, 2018  3:00 PM CST   [facility] Visit with Rome Perdue MD   [facility]    [address] [place] [id number] [phone]           Bring a current list of meds and any records pertaining to this visit. For Physicals, please bring immunization records and any forms needing to be filled out. Please arrive 10 minutes early to complete paperwork.              Who to contact     If you have questions or need follow up information about today's clinic visit or your schedule please contact [facility] directly at [phone].  Normal or non-critical lab and imaging results will be communicated to you by MyChart, letter or phone within 4 business days after the clinic has received the results. If you do not hear from us within 7 days, please contact the clinic through MyChart or phone. If you have a critical or abnormal lab result, we will notify you by phone as soon as possible.  Submit refill requests through [employer] or call your pharmacy and they will forward the refill request to us. Please allow 3 business days for your refill to be completed.          Additional Information About Your Visit        MyChart Information     [employer] lets you send messages to your doctor, view your test results, renew your prescriptions, schedule appointments and more. To sign up, go to [email or web address] . Click on \"Log in\" on the left side of the screen, which will take you to the Welcome page. Then click on \"Sign up Now\" on the right side of the page.     You will be asked to enter the access code listed below, as well as some personal information. Please follow the directions to create your " username and password.     Your access code is: [email or web address]  Expires: 2019  3:14 PM     Your access code will  in 90 days. If you need help or a new code, please call your [place] clinic or [phone].        Care EveryWhere ID     This is your Care EveryWhere ID. This could be used by other organizations to access your [place] medical records  [id number]         Blood Pressure from Last 3 Encounters:   18 128/70   18 150/86   08/10/18 142/55    Weight from Last 3 Encounters:   18 199 lb (90.3 kg)   18 184 lb (83.5 kg)   18 184 lb (83.5 kg)              Today, you had the following     No orders found for display       Primary Care Provider Office Phone # Fax #    Rome Perdue [phone] [address] [address]        Equal Access to Services     [facility] : Hadii aad ku hadasho Soomaali, waaxda luqadaha, qaybta kaalmada adeegyada, waxay idiin hayaan dutch brennan . So [facility] [place] [phone].    ATENCIÓN: Si habla español, tiene a ramirez disposición servicios gratuitos de asistencia lingüística. Llame al [phone].    We comply with applicable federal civil rights laws and [place] laws. We do not discriminate on the basis of race, color, national origin, age, disability, sex, sexual orientation, or gender identity.            Thank you!     Thank you for choosing [facility]  for your care. Our goal is always to provide you with excellent care. Hearing back from our patients is one way we can continue to improve our services. Please take a few minutes to complete the written survey that you may receive in the mail after your visit with us. Thank you!             Your Updated Medication List - Protect others around you: Learn how to safely use, store and throw away your medicines at www.disposemymeds.org.          This list is accurate as of 18  3:12 PM.  Always use your most recent med list.                    Brand Name Dispense Instructions for use Diagnosis    amLODIPine 10 MG tablet    NORVASC    90 tablet    Take 1 tablet (10 mg) by mouth daily    Hypertension goal BP (blood pressure) < 130/80       aspirin 81 MG tablet     100    ONE DAILY    Hypertension       atorvastatin 10 MG tablet    LIPITOR    90 tablet    Take 1 tablet (10 mg) by mouth daily    Hyperlipidemia LDL goal <100       losartan 25 MG tablet    COZAAR    90 tablet    Take 1 tablet (25 mg) by mouth daily    Hypertension goal BP (blood pressure) < 130/80       metoprolol succinate 50 MG 24 hr tablet    TOPROL-XL    90 tablet    Take 1 tablet (50 mg) by mouth daily    Hypertension goal BP (blood pressure) < 130/80       triamterene-hydrochlorothiazide 37.5-25 MG per capsule    DYAZIDE    90 capsule    Take 1 capsule by mouth daily    Hypertension goal BP (blood pressure) < 130/80

## 2018-11-16 NOTE — PROGRESS NOTES
Miroslava Handy is enrolled/participating in the retail pharmacy Blood Pressure Goals Achievement Program (BPGAP).  Miroslava Handy was evaluated at [facility] on November 16, 2018 at which time her blood pressure was:    BP Readings from Last 3 Encounters:   11/16/18 128/70   11/06/18 150/86   08/10/18 142/55     Reviewed lifestyle modifications for blood pressure control and reduction: including making healthy food choices, managing weight, getting regular exercise, smoking cessation, reducing alcohol consumption, monitoring blood pressure regularly.     Miroslava Handy is not experiencing symptoms.    Follow-Up: BP is at goal of < 130/80mmHg (patient 18+ years of age with or without diabetes).  Recommended follow-up at pharmacy in 6 months.     Recommendation to Provider: no change.  Pt has been very diligent at reducing sodium consumption.  Encouraged by results and will continue working on this,.    Miroslava Handy was evaluated for enrollment into the PGEN study today.    PLEASE INITIATE ENROLLMENT DISCUSSION WITH HTN PTS  1) Between 30-80 years old                                                                                                               2) BMI between 19-50                                                                                                        3) BP ?140/90 AND ?170/110 patients aged 30-59         BP ?150/90 AND ?170/110 non-diabetic patients aged 60-80       BP ?140/90 AND ?170/110 diabetic patients aged 60-80  4) Additional requirements for uncontrolled HTN patients:        Pt on only 1 class of medication  5) EXCLUDE patient if confirmation of:                  ? Cardiac disease                  ? Chronic Kidney Disease                  ? Pregnancy/Breastfeeding                  ? Secondary Hypertension/Pre-eclampsia                                 ? Vascular disease    Patient eligible for enrollment:  No  Patient interested in enrollment:  No    This note  completed by: Thank you,  Alondra Alcantar [facility], r [facility] [place] [phone]

## 2018-11-26 ENCOUNTER — HOSPITAL ENCOUNTER (OUTPATIENT)
Dept: ULTRASOUND IMAGING | Facility: CLINIC | Age: 68
Discharge: HOME | End: 2018-11-26
Attending: FAMILY MEDICINE | Admitting: FAMILY MEDICINE
Payer: MEDICARE

## 2018-11-26 DIAGNOSIS — I10 HYPERTENSION GOAL BP (BLOOD PRESSURE) < 130/80: ICD-10-CM

## 2018-11-26 PROCEDURE — 76770 US EXAM ABDO BACK WALL COMP: CPT

## 2018-11-26 NOTE — PROGRESS NOTES
Note to Staff: please send a result letter    no definite evidence for significant renal artery stenosis (or narrowing).

## 2018-11-26 NOTE — LETTER
[facility]  [address] [place] [address]                  [phone]   November 26, 2018    Miroslava Handy  [address] [place] [address]      Dear Miroslava,    Here is a summary of your recent test results:    no definite evidence for significant renal artery stenosis (or narrowing).       Your test results are enclosed.      Please contact me if you have any questions.    In addition, here is a list of due or overdue Health Maintenance reminders.    Health Maintenance Due   Topic Date Due     Depression Assessment 2 - yearly  01/19/1964     Discuss Advance Directive Planning  01/19/2007     FALL RISK ASSESSMENT  01/19/2017     Bone Density Screening (Dexa)  01/19/2017       Please call us at [phone] (or use St[employer]) to address the above recommendations.            Thank you very much for trusting [facility]..     Healthy regards,          Marcial Perdue M.D.        Results for orders placed or performed during the hospital encounter of 11/26/18   US Renal Complete w Doppler Complete    Narrative    ULTRASOUND RENAL COMPLETE WITH DOPPLER COMPLETE  11/26/2018 8:50 AM     HISTORY:  Hypertension.    COMPARISON: None.    FINDINGS: The right kidney measures 10.3 x 4.5 x 5.5 cm. The right  renal cortex measures 1.0 cm in thickness. This is the lower limits of  normal. There is no hydronephrosis. Renal cortical echogenicity is  unremarkable.    Spectral waveform analysis was performed.  The peak systolic  velocities in the right renal artery at its origin, mid aspect, and  distal aspect are 194, 118, and 107 cm/s, respectively. Low resistance  waveforms are identified in the right renal artery. The resistance  indices in the right arcuate arteries range between 0.63-0.79.    The left kidney measures 10.4 x 4.1 x 4.7 cm. The left renal cortex  measures 1.0 in thickness. This is at the lower limits of normal.  There is no hydronephrosis. Renal  cortical echogenicity is  unremarkable.    Spectral waveform analysis was performed.  The peak systolic  velocities in the left renal artery at its origin, mid aspect, and  distal aspect are 64, 132, and 90 cm/s, respectively. Low resistance  waveforms are identified in the left renal artery. The resistance  indices in the left arcuate arteries range between 0.74-0.81.    The peak systolic velocity in the abdominal aorta superior to the  origin of the renal arteries is 148 cm/s.      Impression    IMPRESSION: Per sonographic velocity criteria, no definite evidence  for significant renal artery stenosis.     DAVID ARAMBULA MD

## 2018-12-03 ENCOUNTER — OFFICE VISIT (OUTPATIENT)
Dept: FAMILY MEDICINE | Facility: CLINIC | Age: 68
End: 2018-12-03
Payer: MEDICARE

## 2018-12-03 VITALS
BODY MASS INDEX: 29.35 KG/M2 | HEART RATE: 75 BPM | TEMPERATURE: 98.2 F | SYSTOLIC BLOOD PRESSURE: 144 MMHG | DIASTOLIC BLOOD PRESSURE: 70 MMHG | HEIGHT: 67 IN | WEIGHT: 187 LBS | OXYGEN SATURATION: 94 %

## 2018-12-03 DIAGNOSIS — N17.9 AKI (ACUTE KIDNEY INJURY) (H): ICD-10-CM

## 2018-12-03 DIAGNOSIS — F17.200 TOBACCO USE DISORDER: ICD-10-CM

## 2018-12-03 DIAGNOSIS — N18.30 CKD (CHRONIC KIDNEY DISEASE) STAGE 3, GFR 30-59 ML/MIN (H): ICD-10-CM

## 2018-12-03 DIAGNOSIS — I10 HYPERTENSION GOAL BP (BLOOD PRESSURE) < 130/80: Primary | ICD-10-CM

## 2018-12-03 DIAGNOSIS — R60.0 LEG EDEMA: ICD-10-CM

## 2018-12-03 PROCEDURE — 36415 COLL VENOUS BLD VENIPUNCTURE: CPT | Performed by: FAMILY MEDICINE

## 2018-12-03 PROCEDURE — 80048 BASIC METABOLIC PNL TOTAL CA: CPT | Performed by: FAMILY MEDICINE

## 2018-12-03 PROCEDURE — 99214 OFFICE O/P EST MOD 30 MIN: CPT | Performed by: FAMILY MEDICINE

## 2018-12-03 RX ORDER — TRIAMTERENE AND HYDROCHLOROTHIAZIDE 37.5; 25 MG/1; MG/1
1 CAPSULE ORAL DAILY
Qty: 90 CAPSULE | Refills: 1 | Status: SHIPPED | OUTPATIENT
Start: 2018-12-03 | End: 2019-11-04

## 2018-12-03 NOTE — PROGRESS NOTES
"  SUBJECTIVE:                                                      Miroslava Handy is a 68 year old female who presents to clinic today for the following health issues:    Hyperlipidemia Follow-Up      Rate your low fat/cholesterol diet?: good    Taking statin?  Pt stopped taking due to leg cramping and back pain    Other lipid medications/supplements?:  none    Hypertension Follow-up      Outpatient blood pressures are being checked at clinic.  Results are 128/70.    Low Salt Diet: no added salt    Patient reports she got back results from US renal and her artery had good flow. She doesn't eat sugar and she tries not to add salt to her diet. Denies side effects from medication. Some swelling in her L LE.       Patient has stopped her statin since it caused severe muscle spasms all over her body in particular calves and neck. Had an episode of bad muscle spasm. After stopping her statin muscle pain resolved.         Problem list and histories reviewed & adjusted, as indicated.  Additional history: as documented    ROS:  Constitutional, HEENT, cardiovascular, pulmonary, GI, , musculoskeletal, neuro, skin, endocrine and psych systems are negative, except as otherwise noted.    This document serves as a record of the services and decisions personally performed and made by Rome Perdue MD. It was created on her behalf by Ingrid Betts, a trained medical scribe. The creation of this document is based the provider's statements to the medical scribe.    Ingrid Betts December 3, 2018 3:13 PM  OBJECTIVE:                                                      /70  Pulse 75  Temp 98.2  F (36.8  C) (Oral)  Ht 1.702 m (5' 7\")  Wt 84.8 kg (187 lb)  SpO2 94%  BMI 29.29 kg/m2 Body mass index is 29.29 kg/(m^2).   GENERAL: healthy, alert, well nourished, well hydrated, no distress  HENT: ear canals- normal; TMs- normal; Nose- normal; Mouth- no ulcers, no lesions  NECK: no tenderness, no adenopathy, no asymmetry, no " masses, no stiffness; thyroid- normal to palpation  RESP: lungs clear to auscultation - no rales, no rhonchi, no wheezes  CV: regular rates and rhythm, normal S1 S2, no S3 or S4 and no murmur, no click or rub -trace edema L LE  ABDOMEN: soft, no tenderness, no  hepatosplenomegaly, no masses, normal bowel sounds    Diagnostic test results:  No results found for this or any previous visit (from the past 24 hour(s)).    ASSESSMENT/PLAN:                                                      Miroslava was seen today for hypertension.    Diagnoses and all orders for this visit:    Hypertension goal BP (blood pressure) < 130/80    Tobacco use disorder - quit 6/12/18    CKD (chronic kidney disease) stage 3, GFR 30-59 ml/min (H)    Other orders  -     Cancel: DEXA HIP/PELVIS/SPINE - Future; Future    Advised to continue with blood pressure medications and to watch salt in diet. Advised patient to monitor weight for fluid retention. Cannot increase Losartan to 50 MG due to kidney functions. Will recheck kidney functions today. Advised to avoid using advil/ibuprophin to protect kidnies. RTC in 2-3 weeks for BP check.     Risks, benefits and alternatives of treatments discussed. Plan agreed on.      Followup: Return in about 3 weeks (around 12/24/2018) for Blood Pressure Recheck.    See patient instructions.           Marcial Perdue MD   Pager: [phone]

## 2018-12-03 NOTE — PATIENT INSTRUCTIONS
What is Chronic Kidney Disease  Chronic kidney disease (CKD) is the permanent loss of kidney function.  When the kidneys are damaged, they do not remove wastes and extra water from the blood as well as they should.  The most common causes of CKD are high blood pressure and diabetes.   It can also run in families, so you may be at higher risk if you have a blood relative with kidney failure.  CKD is a silent condition (having few or no symptoms) and develops so slowly that most people do not realize they are sick until the disease is advanced.  Left undetected and untreated CKD can eventually lead to further problems including hypertension, anemia, weak bones, poor nutrition, nerve damage, and in severe cases kidney failure (requiring dialysis or transplant).  CKD also increases a patient's risk for developing diseases of the heart and blood vessels.    We can diagnose CKD through blood and urine tests.  By detecting CKD in its early stages we can prevent or delay the complications of CKD, including kidney failure and heart disease.  Stages of CKD:  There are five stages of chronic kidney disease.  Your stage of kidney damage is based on the presence of kidney damage (often in the form of urinary proteins) and your glomerular filtration rate (GFR), which is a measure of your level of kidney function.  Things you can do to slow down or avoid kidney failure:    If you have diabetes, control your blood sugar.  Studies show that keeping tight control of blood sugar can delay or prevent kidney failure    If you have high blood pressure, it is important to lower your blood pressure.  Medications called ACE (angiotensin-converting enzyme) inhibitors or ARBs (angiotensin receptor blockers) are used to keep your kidney disease from getting worse.    Be active by including exercise in your daily routine.    Eat a well balanced diet.   We may have you watch the amount of protein you eat.  Too much protein can make the kidneys  "work too hard.    Quit smoking.  Smoking damages the kidneys.  It also raises blood pressure.    Discuss all of your medications, including over-the-counter medications, with your doctor.  You should avoid certain medications, including popular medications such as Advil, Motrin, Aleve (and other \"NSAIDs\") which can further damage your kidneys.  For more information on Chronic Kidney Disease, please see the National Kidney Foundation www.kidney.org.    "

## 2018-12-03 NOTE — MR AVS SNAPSHOT
After Visit Summary   12/3/2018    Miroslava Handy    MRN: [id number]           Patient Information     Date Of Birth          1950        Visit Information        Provider Department      12/3/2018 3:00 PM Rome Perdue MD [facility] [place]        Today's Diagnoses     Hypertension goal BP (blood pressure) < 130/80    -  1    Tobacco use disorder - quit 6/12/18        CKD (chronic kidney disease) stage 3, GFR 30-59 ml/min (H)          Care Instructions    What is Chronic Kidney Disease  Chronic kidney disease (CKD) is the permanent loss of kidney function.  When the kidneys are damaged, they do not remove wastes and extra water from the blood as well as they should.  The most common causes of CKD are high blood pressure and diabetes.   It can also run in families, so you may be at higher risk if you have a blood relative with kidney failure.  CKD is a silent condition (having few or no symptoms) and develops so slowly that most people do not realize they are sick until the disease is advanced.  Left undetected and untreated CKD can eventually lead to further problems including hypertension, anemia, weak bones, poor nutrition, nerve damage, and in severe cases kidney failure (requiring dialysis or transplant).  CKD also increases a patient's risk for developing diseases of the heart and blood vessels.    We can diagnose CKD through blood and urine tests.  By detecting CKD in its early stages we can prevent or delay the complications of CKD, including kidney failure and heart disease.  Stages of CKD:  There are five stages of chronic kidney disease.  Your stage of kidney damage is based on the presence of kidney damage (often in the form of urinary proteins) and your glomerular filtration rate (GFR), which is a measure of your level of kidney function.  Things you can do to slow down or avoid kidney failure:    If you have diabetes, control your blood sugar.  Studies show that  "keeping tight control of blood sugar can delay or prevent kidney failure    If you have high blood pressure, it is important to lower your blood pressure.  Medications called ACE (angiotensin-converting enzyme) inhibitors or ARBs (angiotensin receptor blockers) are used to keep your kidney disease from getting worse.    Be active by including exercise in your daily routine.    Eat a well balanced diet.   We may have you watch the amount of protein you eat.  Too much protein can make the kidneys work too hard.    Quit smoking.  Smoking damages the kidneys.  It also raises blood pressure.    Discuss all of your medications, including over-the-counter medications, with your doctor.  You should avoid certain medications, including popular medications such as Advil, Motrin, Aleve (and other \"NSAIDs\") which can further damage your kidneys.  For more information on Chronic Kidney Disease, please see the National Kidney Foundation www.kidney.org.            Follow-ups after your visit        Follow-up notes from your care team     Return in about 3 weeks (around 12/24/2018) for Blood Pressure Recheck.      Who to contact     If you have questions or need follow up information about today's clinic visit or your schedule please contact [facility] directly at [phone].  Normal or non-critical lab and imaging results will be communicated to you by MyChart, letter or phone within 4 business days after the clinic has received the results. If you do not hear from us within 7 days, please contact the clinic through [employer] or phone. If you have a critical or abnormal lab result, we will notify you by phone as soon as possible.  Submit refill requests through "[employer] or call your pharmacy and they will forward the refill request to us. Please allow 3 business days for your refill to be completed.          Additional Information About Your Visit        [employer] Information     "[employer] lets you send messages to " "your doctor, view your test results, renew your prescriptions, schedule appointments and more. To sign up, go to [email or web address] . Click on \"Log in\" on the left side of the screen, which will take you to the Welcome page. Then click on \"Sign up Now\" on the right side of the page.     You will be asked to enter the access code listed below, as well as some personal information. Please follow the directions to create your username and password.     Your access code is: [email or web address]  Expires: 2019  3:14 PM     Your access code will  in 90 days. If you need help or a new code, please call your [place] clinic or [phone].        Care EveryWhere ID     This is your Care EveryWhere ID. This could be used by other organizations to access your [place] medical records  [id number]        Your Vitals Were     Pulse Temperature Height Pulse Oximetry BMI (Body Mass Index)       75 98.2  F (36.8  C) (Oral) 5' 7\" (1.702 m) 94% 29.29 kg/m2        Blood Pressure from Last 3 Encounters:   18 144/70   18 128/70   18 150/86    Weight from Last 3 Encounters:   18 187 lb (84.8 kg)   18 199 lb (90.3 kg)   18 184 lb (83.5 kg)              Today, you had the following     No orders found for display       Primary Care Provider Office Phone # Fax #    Rome Perdue [phone]       [address] [place] [id number]        Equal Access to Services     [facility] AH: Hadii merlyn Varela, wala nena berrios, qarodger flowers. So [facility] [phone].    ATENCIÓN: Si habla español, tiene a ramirez disposición servicios gratuitos de asistencia lingüística. Brain al [phone].    We comply with applicable federal civil rights laws and [place] laws. We do not discriminate on the basis of race, color, national origin, age, disability, sex, sexual orientation, or gender identity.            Thank you!     " Thank you for choosing [facility]  for your care. Our goal is always to provide you with excellent care. Hearing back from our patients is one way we can continue to improve our services. Please take a few minutes to complete the written survey that you may receive in the mail after your visit with us. Thank you!             Your Updated Medication List - Protect others around you: Learn how to safely use, store and throw away your medicines at www.disposemymeds.org.          This list is accurate as of 12/3/18  3:38 PM.  Always use your most recent med list.                   Brand Name Dispense Instructions for use Diagnosis    amLODIPine 10 MG tablet    NORVASC    90 tablet    Take 1 tablet (10 mg) by mouth daily    Hypertension goal BP (blood pressure) < 130/80       aspirin 81 MG tablet    ASA    100    ONE DAILY    Hypertension       losartan 25 MG tablet    COZAAR    90 tablet    Take 1 tablet (25 mg) by mouth daily    Hypertension goal BP (blood pressure) < 130/80       metoprolol succinate ER 50 MG 24 hr tablet    TOPROL-XL    90 tablet    Take 1 tablet (50 mg) by mouth daily    Hypertension goal BP (blood pressure) < 130/80       triamterene-HCTZ 37.5-25 MG capsule    DYAZIDE    90 capsule    Take 1 capsule by mouth daily    Hypertension goal BP (blood pressure) < 130/80

## 2018-12-04 LAB
ANION GAP SERPL CALCULATED.3IONS-SCNC: 11 MMOL/L (ref 3–14)
BUN SERPL-MCNC: 27 MG/DL (ref 7–30)
CALCIUM SERPL-MCNC: 9.9 MG/DL (ref 8.5–10.1)
CHLORIDE SERPL-SCNC: 105 MMOL/L (ref 94–109)
CO2 SERPL-SCNC: 22 MMOL/L (ref 20–32)
CREAT SERPL-MCNC: 1.57 MG/DL (ref 0.52–1.04)
GFR SERPL CREATININE-BSD FRML MDRD: 33 ML/MIN/1.7M2
GLUCOSE SERPL-MCNC: 85 MG/DL (ref 70–99)
POTASSIUM SERPL-SCNC: 4.5 MMOL/L (ref 3.4–5.3)
SODIUM SERPL-SCNC: 138 MMOL/L (ref 133–144)

## 2018-12-05 NOTE — PROGRESS NOTES
Dear Dawn,    Here is a summary of your recent test results:  -Kidney function (GFR) is decreased, but stable. You should stay well hydrated and avoid ibuprofen, and naproxen ADVISE: rechecking this in 3 months  -Sodium is normal.  -Potassium is normal.  -Calcium is normal.  -Glucose (diabetic screening test) is normal.    For additional lab test information, labtestsonline.org is an excellent reference.           Thank you very much for trusting me and [facility].     Healthy regards,  Marcial Perdue MD

## 2018-12-22 DIAGNOSIS — I10 HYPERTENSION GOAL BP (BLOOD PRESSURE) < 130/80: ICD-10-CM

## 2018-12-22 NOTE — TELEPHONE ENCOUNTER
"Requested Prescriptions   Pending Prescriptions Disp Refills     triamterene-HCTZ (DYAZIDE) 37.5-25 MG capsule [Pharmacy Med Name: TRIAMTERENE-HCTZ 37.5-25 MG CP]  Last Written Prescription Date:  12/3/2018  Last Fill Quantity: 90 capsule,  # refills: 1   Last Office Visit: 12/3/2018   Future Office Visit:      90 capsule 1     Sig: TAKE 1 CAPSULE BY MOUTH EVERY DAY    Diuretics (Including Combos) Protocol Failed - 12/22/2018  8:52 AM       Failed - Blood pressure under 140/90 in past 12 months    BP Readings from Last 3 Encounters:   12/03/18 144/70   11/16/18 128/70   11/06/18 150/86                Failed - Normal serum creatinine on file in past 12 months    Recent Labs   Lab Test 12/03/18  1552   CR 1.57*             Passed - Recent (12 mo) or future (30 days) visit within the authorizing provider's specialty    Patient had office visit in the last 12 months or has a visit in the next 30 days with authorizing provider or within the authorizing provider's specialty.  See \"Patient Info\" tab in inbasket, or \"Choose Columns\" in Meds & Orders section of the refill encounter.             Passed - Patient is age 18 or older       Passed - No active pregancy on record       Passed - Normal serum potassium on file in past 12 months    Recent Labs   Lab Test 12/03/18  1552   POTASSIUM 4.5                   Passed - Normal serum sodium on file in past 12 months    Recent Labs   Lab Test 12/03/18  1552                Passed - No positive pregnancy test in past 12 months          "

## 2018-12-26 RX ORDER — TRIAMTERENE AND HYDROCHLOROTHIAZIDE 37.5; 25 MG/1; MG/1
CAPSULE ORAL
Qty: 90 CAPSULE | Refills: 1 | Status: SHIPPED | OUTPATIENT
Start: 2018-12-26 | End: 2019-07-09

## 2018-12-26 NOTE — TELEPHONE ENCOUNTER
Pharmacy change.  Also alerted pt they are due for another BP check per protocol  Yumiko Nash RN  [place] [facility]

## 2019-05-10 DIAGNOSIS — I10 HYPERTENSION GOAL BP (BLOOD PRESSURE) < 130/80: ICD-10-CM

## 2019-05-10 NOTE — LETTER
[address] [address]                                                                                                       [phone]    Ericka 3, 2019    Miroslava Handy  [address] [place] [address]      Dear Miroslava;    We have been calling you regarding a recent refill request we received for multiple medications.  Unfortunately, we were unable to reach you.  We are notifying you that you are due for Nurse only Blood Pressure check  prior to your next refill.  You can schedule this appointment via [employer] or by calling the clinic at [phone].      Sincerely,          Marcial Perdue M.D./jane

## 2019-05-13 NOTE — TELEPHONE ENCOUNTER
"Requested Prescriptions   Pending Prescriptions Disp Refills     amLODIPine (NORVASC) 10 MG tablet        Last Written Prescription Date:  11.6.18  Last Fill Quantity: 90 tablet,  # refills: 1   Last office visit: 12/3/2018 with prescribing provider:  Rome Perdue MD         Future Office Visit:       90 tablet 1     Sig: Take 1 tablet (10 mg) by mouth daily       Calcium Channel Blockers Protocol  Failed - 5/10/2019 11:29 AM        Failed - Blood pressure under 140/90 in past 12 months     BP Readings from Last 3 Encounters:   12/03/18 144/70   11/16/18 128/70   11/06/18 150/86                 Failed - Normal serum creatinine on file in past 12 months     Recent Labs   Lab Test 12/03/18  1552  06/11/18  1342   CR 1.57*   < >  --    CREAT  --   --  0.7    < > = values in this interval not displayed.             Passed - Recent (12 mo) or future (30 days) visit within the authorizing provider's specialty     Patient had office visit in the last 12 months or has a visit in the next 30 days with authorizing provider or within the authorizing provider's specialty.  See \"Patient Info\" tab in inbasket, or \"Choose Columns\" in Meds & Orders section of the refill encounter.              Passed - Medication is active on med list        Passed - Patient is age 18 or older        Passed - No active pregnancy on record        Passed - No positive pregnancy test in past 12 months        metoprolol succinate ER (TOPROL-XL) 50 MG 24 hr tablet      Last Written Prescription Date:  11.6.18  Last Fill Quantity: 90 tablet,  # refills: 1   Last office visit: 12/3/2018 with prescribing provider:  Rome Perdue MD         Future Office Visit:       90 tablet 1     Sig: Take 1 tablet (50 mg) by mouth daily       Beta-Blockers Protocol Failed - 5/10/2019 11:29 AM        Failed - Blood pressure under 140/90 in past 12 months     BP Readings from Last 3 Encounters:   12/03/18 144/70   11/16/18 128/70   11/06/18 150/86                 " "Passed - Patient is age 6 or older        Passed - Recent (12 mo) or future (30 days) visit within the authorizing provider's specialty     Patient had office visit in the last 12 months or has a visit in the next 30 days with authorizing provider or within the authorizing provider's specialty.  See \"Patient Info\" tab in inbasket, or \"Choose Columns\" in Meds & Orders section of the refill encounter.              Passed - Medication is active on med list        "

## 2019-05-14 RX ORDER — AMLODIPINE BESYLATE 10 MG/1
10 TABLET ORAL DAILY
Qty: 90 TABLET | Refills: 0 | Status: SHIPPED | OUTPATIENT
Start: 2019-05-14 | End: 2019-05-14

## 2019-05-14 RX ORDER — METOPROLOL SUCCINATE 50 MG/1
50 TABLET, EXTENDED RELEASE ORAL DAILY
Qty: 90 TABLET | Refills: 1 | Status: SHIPPED | OUTPATIENT
Start: 2019-05-14 | End: 2019-10-29

## 2019-05-14 RX ORDER — AMLODIPINE BESYLATE 10 MG/1
10 TABLET ORAL DAILY
Qty: 90 TABLET | Refills: 1 | Status: SHIPPED | OUTPATIENT
Start: 2019-05-14 | End: 2020-01-09

## 2019-05-20 NOTE — TELEPHONE ENCOUNTER
Left non-detailed message for patient to call back.  Please schedule follow up when patient calls back.  (see previous notes for details)    Mercy Gonzalez

## 2019-06-03 NOTE — TELEPHONE ENCOUNTER
Left non-detailed message for patient to call back.  Please schedule follow up for blood pressure/nurse appointment when patient calls back.  (see previous notes for details)    Letter sent.  Closed encounter     Thanks Meg

## 2019-07-09 DIAGNOSIS — I10 HYPERTENSION GOAL BP (BLOOD PRESSURE) < 130/80: ICD-10-CM

## 2019-07-09 RX ORDER — TRIAMTERENE AND HYDROCHLOROTHIAZIDE 37.5; 25 MG/1; MG/1
CAPSULE ORAL
Qty: 30 CAPSULE | Refills: 0 | Status: SHIPPED | OUTPATIENT
Start: 2019-07-09 | End: 2019-08-13

## 2019-07-09 NOTE — TELEPHONE ENCOUNTER
Routing refill request to provider for review/approval because:  Labs out of range:  BP, Cr  Patient needs to be seen because:  Patient advised at LOV on 12/3/2018 to recheck BP in 3 weeks and has not followed up.    JOHN MorganN, RN  [facility]

## 2019-07-09 NOTE — TELEPHONE ENCOUNTER
"Requested Prescriptions   Pending Prescriptions Disp Refills     triamterene-HCTZ (DYAZIDE) 37.5-25 MG capsule [Pharmacy Med Name: TRIAMTERENE-HCTZ 37.5-25 MG CP] 90 capsule 1     Sig: TAKE 1 CAPSULE BY MOUTH EVERY DAY. PT DUE FOR BLOOD PRESSURE CHECKUP       Last Written Prescription Date:  12/26/2019  Last Fill Quantity: 90,  # refills: 1   Last office visit: 12/3/2018 with prescribing provider:     NOTE on Rx states - Pt due for BP check      Diuretics (Including Combos) Protocol Failed - 7/9/2019  1:55 AM        Failed - Blood pressure under 140/90 in past 12 months     BP Readings from Last 3 Encounters:   12/03/18 144/70   11/16/18 128/70   11/06/18 150/86                 Failed - Normal serum creatinine on file in past 12 months     Recent Labs   Lab Test 12/03/18  1552   CR 1.57*              Passed - Recent (12 mo) or future (30 days) visit within the authorizing provider's specialty     Patient had office visit in the last 12 months or has a visit in the next 30 days with authorizing provider or within the authorizing provider's specialty.  See \"Patient Info\" tab in inbasket, or \"Choose Columns\" in Meds & Orders section of the refill encounter.              Passed - Medication is active on med list        Passed - Patient is age 18 or older        Passed - No active pregancy on record        Passed - Normal serum potassium on file in past 12 months     Recent Labs   Lab Test 12/03/18  1552   POTASSIUM 4.5                    Passed - Normal serum sodium on file in past 12 months     Recent Labs   Lab Test 12/03/18  1552                 Passed - No positive pregnancy test in past 12 months          "

## 2019-07-09 NOTE — LETTER
[address] [address]                                                                                                       [phone]    July 17, 2019    Miroslava Handy  [address] [place] [address]      Dawn:    We have been calling you regarding a recent refill request we received for Dyazide.  Unfortunately, we were unable to reach you.  We are notifying you that you are due for an office vist and blood pressure check  prior to your next refill.  You can schedule this appointment via [employer] or by calling the clinic at [phone].      Sincerely,          Marcial Perdue M.D./jane

## 2019-07-11 NOTE — TELEPHONE ENCOUNTER
Left non-detailed message for patient to call back.  Please schedule follow up when patient calls back.  (see previous notes for details)    Thanks Meg

## 2019-07-17 NOTE — TELEPHONE ENCOUNTER
Left non-detailed message for patient to call back.  Please schedule follow up when patient calls back.  (see previous notes for details)    I have been unable to reach this patient by phone.  A letter is being sent to the last known home address.      Meg Kelly

## 2019-08-13 DIAGNOSIS — I10 HYPERTENSION GOAL BP (BLOOD PRESSURE) < 130/80: ICD-10-CM

## 2019-08-13 NOTE — LETTER
[facility]  [address] [place] [id number] [phone]  August 20, 2019    Miroslava Handy  [address] [place] [address]    Dear Dawn,    I care about your health and have reviewed your health plan. I have reviewed your medical conditions, medication list, and lab results and am making recommendations based on this review, to better manage your health.    You are in particular need of attention regarding:  -High Blood Pressure    I am recommending that you:  -schedule a FOLLOWUP OFFICE APPOINTMENT with me.         Here is a list of Health Maintenance topics that are due now or due soon:  Health Maintenance Due   Topic Date Due     Osteoporosis Screening  1950     Discuss Advance Care Planning  1950     Zoster (Shingles) Vaccine (1 of 2) 01/19/2002     Annual Wellness Visit  01/19/2017     FALL RISK ASSESSMENT  01/19/2017     PHQ-2  01/01/2019     Pneumococcal Vaccine (2 of 2 - PPSV23) 06/14/2019     FIT Test  06/15/2019     Kidney Microalbumin Urine Test  07/12/2019       Please call us at [phone] (or use [employer]) to address the above recommendations.                     Thank you for trusting [facility] and we appreciate the opportunity to serve you.  We look forward to supporting your healthcare needs in the future.    Healthy Regards,            Marcial Perdue M.D.

## 2019-08-13 NOTE — TELEPHONE ENCOUNTER
"Requested Prescriptions   Pending Prescriptions Disp Refills     triamterene-HCTZ (DYAZIDE) 37.5-25 MG capsule [Pharmacy Med Name:  Last Written Prescription Date:  7/9/19  Last Fill Quantity: 30,  # refills: 0   Last Office Visit: 12/3/2018   Future Office Visit:      TRIAMTERENE-HCTZ 37.5-25 MG CP] 30 capsule 0     Sig: TAKE 1 CAPSULE BY MOUTH EVERY DAY. PT DUE FOR BLOOD PRESSURE CHECKUP       Diuretics (Including Combos) Protocol Failed - 8/13/2019  1:22 AM        Failed - Blood pressure under 140/90 in past 12 months     BP Readings from Last 3 Encounters:   12/03/18 144/70   11/16/18 128/70   11/06/18 150/86           Failed - Normal serum creatinine on file in past 12 months     Recent Labs   Lab Test 12/03/18  1552   CR 1.57*           Passed - Recent (12 mo) or future (30 days) visit within the authorizing provider's specialty     Patient had office visit in the last 12 months or has a visit in the next 30 days with authorizing provider or within the authorizing provider's specialty.  See \"Patient Info\" tab in inbasket, or \"Choose Columns\" in Meds & Orders section of the refill encounter.          Passed - Medication is active on med list        Passed - Patient is age 18 or older        Passed - No active pregancy on record        Passed - Normal serum potassium on file in past 12 months     Recent Labs   Lab Test 12/03/18  1552   POTASSIUM 4.5            Passed - Normal serum sodium on file in past 12 months     Recent Labs   Lab Test 12/03/18  1552               Passed - No positive pregnancy test in past 12 months          "

## 2019-08-14 NOTE — TELEPHONE ENCOUNTER
Failed BP routing to provider- please send to team if patient needs appointment.    Meg Mead,RN BSN  [facility]  [phone]

## 2019-08-15 RX ORDER — TRIAMTERENE AND HYDROCHLOROTHIAZIDE 37.5; 25 MG/1; MG/1
CAPSULE ORAL
Qty: 30 CAPSULE | Refills: 0 | Status: SHIPPED | OUTPATIENT
Start: 2019-08-15 | End: 2019-11-04

## 2019-08-16 NOTE — TELEPHONE ENCOUNTER
Called [phone] and left non detailed message for pt to call back.  See below.  Due for HTN follow up.  Honey Nieves

## 2019-08-20 NOTE — TELEPHONE ENCOUNTER
Second attempt - Left non-detailed message for patient to call back.  Please schedule follow up when patient calls back.  (see previous notes for details)  Letter sent.  Closing encounter.    Mercy Gonzalez

## 2019-10-29 DIAGNOSIS — I10 HYPERTENSION GOAL BP (BLOOD PRESSURE) < 130/80: ICD-10-CM

## 2019-10-29 RX ORDER — METOPROLOL SUCCINATE 50 MG/1
TABLET, EXTENDED RELEASE ORAL
Qty: 30 TABLET | Refills: 0 | Status: SHIPPED | OUTPATIENT
Start: 2019-10-29 | End: 2019-11-29

## 2019-10-29 NOTE — TELEPHONE ENCOUNTER
Due for an Office visit for further refills, only fill for 30 days     Idania Reynolds RN, BSN  [facility]

## 2019-10-29 NOTE — TELEPHONE ENCOUNTER
"METOPROLOL SUCC ER 50 MG TAB  Last Written Prescription Date:  5/14/2019  Last Fill Quantity: 90,  # refills: 1   Last office visit: 12/3/2018 with prescribing provider:  Rome Perdue MD   Future Office Visit:  NA    Requested Prescriptions   Pending Prescriptions Disp Refills     metoprolol succinate ER (TOPROL-XL) 50 MG 24 hr tablet [Pharmacy Med Name: METOPROLOL SUCC ER 50 MG TAB] 90 tablet 1     Sig: TAKE 1 TABLET BY MOUTH EVERY DAY       Beta-Blockers Protocol Failed - 10/29/2019  1:48 AM        Failed - Blood pressure under 140/90 in past 12 months     BP Readings from Last 3 Encounters:   12/03/18 144/70   11/16/18 128/70   11/06/18 150/86                 Passed - Patient is age 6 or older        Passed - Recent (12 mo) or future (30 days) visit within the authorizing provider's specialty     Patient has had an office visit with the authorizing provider or a provider within the authorizing providers department within the previous 12 mos or has a future within next 30 days. See \"Patient Info\" tab in inbasket, or \"Choose Columns\" in Meds & Orders section of the refill encounter.              Passed - Medication is active on med list          "

## 2019-10-31 ENCOUNTER — ALLIED HEALTH/NURSE VISIT (OUTPATIENT)
Dept: FAMILY MEDICINE | Facility: CLINIC | Age: 69
End: 2019-10-31
Payer: MEDICARE

## 2019-10-31 VITALS — DIASTOLIC BLOOD PRESSURE: 80 MMHG | SYSTOLIC BLOOD PRESSURE: 151 MMHG

## 2019-10-31 DIAGNOSIS — I10 HYPERTENSION GOAL BP (BLOOD PRESSURE) < 130/80: Primary | ICD-10-CM

## 2019-10-31 PROCEDURE — 99207 ZZC NO CHARGE NURSE ONLY: CPT | Performed by: FAMILY MEDICINE

## 2019-10-31 NOTE — PROGRESS NOTES
Miroslava Handy was evaluated at [facility] on October 31, 2019 at which time her blood pressure was:    BP Readings from Last 3 Encounters:   10/31/19 (!) 151/80   12/03/18 144/70   11/16/18 128/70     Pulse Readings from Last 3 Encounters:   12/03/18 75   11/06/18 85   06/28/18 90       Reviewed lifestyle modifications for blood pressure control and reduction: including making healthy food choices, managing weight, getting regular exercise, smoking cessation, reducing alcohol consumption, monitoring blood pressure regularly.     Symptoms: None    BP Goal:< 140/90 mmHg    BP Assessment:  BP too high    Potential Reasons for BP too high: Unknown/Other: patient states she has not been eating well - high sodium foods    BP Follow-Up Plan: Recheck BP in 30 days at pharmacy    Recommendation to Provider: patient state she is usually in the 140's for systolic. She may benefit from a dose increase on a blood pressure medication if she remains elevated. She will recheck with us in 30 days to see if it comes down while she works on changing her diet.    Note completed by: Alondra Stone, PharmD  [facility]  PH: [phone]

## 2019-11-01 ENCOUNTER — TELEPHONE (OUTPATIENT)
Dept: FAMILY MEDICINE | Facility: CLINIC | Age: 69
End: 2019-11-01

## 2019-11-01 DIAGNOSIS — I10 HYPERTENSION GOAL BP (BLOOD PRESSURE) < 130/80: ICD-10-CM

## 2019-11-01 NOTE — TELEPHONE ENCOUNTER
SEE MD CECILE MESSAGE BELOW:   She should increase her losartan 50 mg and recheck blood pressure in ~ 1 month            Attempt #1  Called patient @ [phone] - Twru a non-detailed message to call back and speak with any triage nurse.    Elsa Winters RN  [place] [facility]

## 2019-11-04 RX ORDER — TRIAMTERENE AND HYDROCHLOROTHIAZIDE 37.5; 25 MG/1; MG/1
CAPSULE ORAL
Qty: 90 CAPSULE | Refills: 0 | Status: SHIPPED | OUTPATIENT
Start: 2019-11-04 | End: 2020-01-09

## 2019-11-04 RX ORDER — LOSARTAN POTASSIUM 25 MG/1
50 TABLET ORAL DAILY
Qty: 180 TABLET | Refills: 0 | Status: SHIPPED | OUTPATIENT
Start: 2019-11-04 | End: 2020-01-09

## 2019-11-04 NOTE — TELEPHONE ENCOUNTER
Called patient @ # below -     Advised of MD CECILE message below - Patient stated an understanding and agreed with plan.  Med List Updated (Rx's sent)      Elsa Winters RN  [place] [facility]

## 2019-11-27 DIAGNOSIS — I10 HYPERTENSION GOAL BP (BLOOD PRESSURE) < 130/80: ICD-10-CM

## 2019-11-27 RX ORDER — METOPROLOL SUCCINATE 50 MG/1
TABLET, EXTENDED RELEASE ORAL
Qty: 30 TABLET | Refills: 0 | OUTPATIENT
Start: 2019-11-27

## 2019-11-27 NOTE — TELEPHONE ENCOUNTER
Patient already given 1x rachel refill and did not follow up (due for PX/Med Check & BP Check) - no future appointment scheduled  Rx denied. Pharmacy notified    Routing to response pool to call/schedule    Elsa Winters RN  [facility]

## 2019-11-27 NOTE — TELEPHONE ENCOUNTER
Attempt #1  Called patient @ [phone] - Left a non-detailed message to call back.    If patient calls back, please schedule a FASTING PHYSICAL and route back to RN team.       Elsa Winters RN  [facility]

## 2019-11-27 NOTE — TELEPHONE ENCOUNTER
"Requested Prescriptions   Pending Prescriptions Disp Refills     metoprolol succinate ER (TOPROL-XL) 50 MG 24 hr tablet [Pharmacy Med Name: METOPROLOL SUCC ER 50 MG TAB]          Last Written Prescription Date:  10.29.19  Last Fill Quantity: 30 tablet,  # refills: 0   Last office visit: 12/3/2018 with prescribing provider:  Rome Perdue MD             Future Office Visit:       30 tablet 0     Sig: TAKE 1 TABLET BY MOUTH EVERY DAY       Beta-Blockers Protocol Failed - 11/27/2019  6:07 AM        Failed - Blood pressure under 140/90 in past 12 months     BP Readings from Last 3 Encounters:   10/31/19 (!) 151/80   12/03/18 144/70   11/16/18 128/70                 Passed - Patient is age 6 or older        Passed - Recent (12 mo) or future (30 days) visit within the authorizing provider's specialty     Patient has had an office visit with the authorizing provider or a provider within the authorizing providers department within the previous 12 mos or has a future within next 30 days. See \"Patient Info\" tab in inbasket, or \"Choose Columns\" in Meds & Orders section of the refill encounter.              Passed - Medication is active on med list        "

## 2019-11-29 RX ORDER — METOPROLOL SUCCINATE 50 MG/1
50 TABLET, EXTENDED RELEASE ORAL DAILY
Qty: 30 TABLET | Refills: 0 | Status: SHIPPED | OUTPATIENT
Start: 2019-11-29 | End: 2019-12-27

## 2019-11-29 NOTE — TELEPHONE ENCOUNTER
Called patient @ # below -     Advised of notes below - Patient stated an understanding and agreed with plan.  Next 5 appointments (look out 90 days)    Dec 30, 2019  9:20 AM CST  [facility] [address] [place] [id number] [phone]        Medication is being filled for 1 time refill only due to:  Patient needs to be seen because it has been more than one year since last visit.       Elsa Winters RN  [facility]

## 2019-12-24 DIAGNOSIS — I10 HYPERTENSION GOAL BP (BLOOD PRESSURE) < 130/80: ICD-10-CM

## 2019-12-24 NOTE — TELEPHONE ENCOUNTER
"Last Written Prescription Date:  11/29/19  Last Fill Quantity: 30,  # refills: 0   Last office visit: 12/3/2018 with prescribing provider: Nette    Future Office Visit:   Next 5 appointments (look out 90 days)    Dec 30, 2019  9:20 AM CST  PHYSICAL with Rome Perdue MD  [facility] ([facility]) [address] [place] [id number] [phone]           Requested Prescriptions   Pending Prescriptions Disp Refills     metoprolol succinate ER (TOPROL-XL) 50 MG 24 hr tablet [Pharmacy Med Name: METOPROLOL SUCC ER 50 MG TAB] 30 tablet 0     Sig: TAKE 1 TABLET BY MOUTH EVERY DAY       Beta-Blockers Protocol Failed - 12/24/2019  2:49 AM        Failed - Blood pressure under 140/90 in past 12 months     BP Readings from Last 3 Encounters:   10/31/19 (!) 151/80   12/03/18 144/70   11/16/18 128/70                 Passed - Patient is age 6 or older        Passed - Recent (12 mo) or future (30 days) visit within the authorizing provider's specialty     Patient has had an office visit with the authorizing provider or a provider within the authorizing providers department within the previous 12 mos or has a future within next 30 days. See \"Patient Info\" tab in inbasket, or \"Choose Columns\" in Meds & Orders section of the refill encounter.              Passed - Medication is active on med list          "

## 2019-12-24 NOTE — TELEPHONE ENCOUNTER
Routing refill request to provider for review/approval because:  Valerie given x1 and patient did not follow up, please advise  Labs out of range:  BP  Patient needs to be seen because it has been more than 1 year since last office visit.    Arely Huff RN, BSN  [facility]

## 2019-12-27 RX ORDER — METOPROLOL SUCCINATE 50 MG/1
TABLET, EXTENDED RELEASE ORAL
Qty: 30 TABLET | Refills: 0 | Status: SHIPPED | OUTPATIENT
Start: 2019-12-27 | End: 2020-01-09

## 2020-01-07 NOTE — PROGRESS NOTES
SUBJECTIVE:   Miroslava Handy is a 69 year old female who presents for Preventive Visit.  Are you in the first 12 months of your Medicare coverage?  No    Healthy Habits:    In general, how would you rate your overall health?  Fair    Frequency of exercise:  2-3 days/week    Duration of exercise:  15-30 minutes    Taking medications regularly:  Yes    Barriers to taking medications:  None    Medication side effects:  None    Ability to successfully perform activities of daily living:  No assistance needed    Home Safety:  No safety concerns identified    Hearing Impairment:  No hearing concerns    In the past 6 months, have you been bothered by leaking of urine? Yes    In general, how would you rate your overall mental or emotional health?  Poor      PHQ-2 Total Score:    Additional concerns today:  Yes      Depression/Anxiety  Feels Depressed and Anxiety with family, children grand childen. Stressful life events recently. Discussed possibility of therapy and/or medication. She is not sleeping well - 2 weeks.     Do you feel safe in your environment? Yes    Have you ever done Advance Care Planning? (For example, a Health Directive, POLST, or a discussion with a medical provider or your loved ones about your wishes): No, advance care planning information given to patient to review.  Advanced care planning was discussed at today's visit.    Fall risk  Fallen 2 or more times in the past year?: No  Any fall with injury in the past year?: No    Cognitive Screening   1) Repeat 3 items (Leader, Season, Table)    2) Clock draw: NORMAL  3) 3 item recall: Recalls 3 objects  Results: 3 items recalled: COGNITIVE IMPAIRMENT LESS LIKELY    Mini-CogTM Copyright REJI Dumas. Licensed by the author for use in [facility]; reprinted with permission (donald@.[facility]). All rights reserved.      Do you have sleep apnea, excessive snoring or daytime drowsiness?: Snores    Reviewed and updated as needed this visit by clinical  staff  Tobacco  Allergies  Meds  Problems  Med Hx  Surg Hx  Fam Hx         Reviewed and updated as needed this visit by Provider  Tobacco  Allergies  Meds  Problems  Med Hx  Surg Hx  Fam Hx        Social History     Tobacco Use     Smoking status: Former Smoker     Packs/day: 0.90     Years: 31.00     Pack years: 27.90     Types: Cigarettes     Last attempt to quit: 2018     Years since quittin.5     Smokeless tobacco: Never Used   Substance Use Topics     Alcohol use: Yes     Comment: 1 glass of wine per month     If you drink alcohol do you typically have >3 drinks per day or >7 drinks per week? No    Alcohol Use 2020   Prescreen: >3 drinks/day or >7 drinks/week? No       Hypertension Follow-up    Do you check your blood pressure regularly outside of the clinic? Yes     Are you following a low salt diet? Yes    Are your blood pressures ever more than 140 on the top number (systolic) OR more   than 90 on the bottom number (diastolic), for example 140/90? Yes    Creatinine   Date Value Ref Range Status   2018 1.57 (H) 0.52 - 1.04 mg/dL Final       Current providers sharing in care for this patient include:   Patient Care Team:  Rome Perdue MD as PCP - General  Rome Perdue MD as Assigned PCP    The following health maintenance items are reviewed in Epic and correct as of today:  Health Maintenance   Topic Date Due     DEXA  1950     ZOSTER IMMUNIZATION (1 of 2) 2002     PNEUMOCOCCAL IMMUNIZATION 65+ LOW/MEDIUM RISK (2 of 2 - PPSV23) 2019     FIT  06/15/2019     MICROALBUMIN  2019     BMP  2019     MAMMO SCREENING  2020     PHQ-9  2020     MEDICARE ANNUAL WELLNESS VISIT  2021     FALL RISK ASSESSMENT  2021     LIPID  2023     ADVANCE CARE PLANNING  2025     DTAP/TDAP/TD IMMUNIZATION (2 - Td) 2028     HEPATITIS C SCREENING  Completed     INFLUENZA VACCINE  Completed     IPV IMMUNIZATION  Aged Out      "MENINGITIS IMMUNIZATION  Aged Out     Mammogram Screening: Mammogram Screening: Patient over age 50, mutual decision to screen reflected in health maintenance.  History of abnormal Pap smear: NO - age 65 - see link Cervical Cytology Screening Guidelines    Pneumovax will be given today.      ROS:  Constitutional, HEENT, cardiovascular, pulmonary, GI, , musculoskeletal, neuro, skin, endocrine and psych systems are negative, except as otherwise noted.  This document serves as a record of the services and decisions personally performed and made by Rome Perdue MD. It was created on his behalf by Leonel Ndiaye, a trained medical scribe. The creation of this document is based the provider's statements to the medical scribe.  Scribe Leonel Ndiaye 12:18 PM, January 9, 2020    OBJECTIVE:   BP (!) 160/70   Pulse 88   Temp 98.1  F (36.7  C) (Tympanic)   Ht 1.715 m (5' 7.5\")   Wt 90.3 kg (199 lb)   SpO2 95%   Breastfeeding No   BMI 30.71 kg/m   Estimated body mass index is 30.71 kg/m  as calculated from the following:    Height as of this encounter: 1.715 m (5' 7.5\").    Weight as of this encounter: 90.3 kg (199 lb).  EXAM:   GENERAL: healthy, alert, well nourished, well hydrated, no distress  EYES: Eyes grossly normal to inspection, extraocular movements - intact, and PERRL  HENT: ear canals- normal; TMs- normal; Nose- normal; Mouth- no ulcers, no lesions  NECK: no tenderness, no adenopathy, no asymmetry, no masses, no stiffness; thyroid- normal to palpation  RESP: lungs clear to auscultation - no rales, no rhonchi, no wheezes  CV: regular rates and rhythm, normal S1 S2, no S3 or S4 and no murmur, no click or rub -  ABDOMEN: soft, no tenderness, no  hepatosplenomegaly, no masses, normal bowel sounds  MS: extremities- no gross deformities noted, no edema  SKIN: no suspicious lesions, no rashes  NEURO: strength and tone- normal, sensory exam- grossly normal, mentation- intact, speech- normal, reflexes- " symmetric  PSYCH: Alert and oriented times 3; speech- coherent , normal rate and volume; able to articulate logical thoughts, able to abstract reason, no tangential thoughts, no hallucinations or delusions, affect- normal  LYMPHATICS: ant. cervical- normal, post. cervical- normal, axillary- normal, supraclavicular- normal, inguinal- normal    ASSESSMENT / PLAN:   Miroslava was seen today for wellness visit.    Diagnoses and all orders for this visit:    Medicare annual wellness visit, subsequent  -     Comprehensive metabolic panel (BMP + Alb, Alk Phos, ALT, AST, Total. Bili, TP)  -     CBC with platelets    Hypertension goal BP (blood pressure) < 130/80 - blood pressure uncontrolled today. Losartan dose increased today. Labs pending today. Start new dose:  -     Albumin Random Urine Quantitative with Creat Ratio  -     losartan (COZAAR) 50 MG tablet; Take 1 tablet (50 mg) by mouth daily  -     amLODIPine (NORVASC) 10 MG tablet; Take 1 tablet (10 mg) by mouth daily  -     metoprolol succinate ER (TOPROL-XL) 50 MG 24 hr tablet; Take 1 tablet (50 mg) by mouth daily  -     triamterene-HCTZ (DYAZIDE) 37.5-25 MG capsule; TAKE 1 CAPSULE BY MOUTH EVERY DAY  -     Comprehensive metabolic panel (BMP + Alb, Alk Phos, ALT, AST, Total. Bili, TP)  -     TSH with free T4 reflex    CKD (chronic kidney disease) stage 3, GFR 30-59 ml/min (H) - last GFR was 33 ml/min. Labs pending today.  -     Lipid panel reflex to direct LDL Non-fasting    Situational mixed anxiety and depressive disorder - worse lately from outside stresses. Referral given. Start:  -     FLUoxetine (PROZAC) 10 MG capsule; Take 1 capsule (10 mg) by mouth daily  -     MENTAL HEALTH REFERRAL  - Adult; Outpatient Treatment; Individual/Couples/Family/Group Therapy/Health Psychology; [facility] [phone]; We will contact you to schedule the appointment or please call with any questions  -     TSH with free T4 reflex    Abnormal finding of blood  "chemistry, unspecified - labs pending today.   -     Lipid panel reflex to direct LDL Non-fasting    Screening for colon cancer  -     Fecal colorectal cancer screen (FIT); Future    Encounter for immunization  -     VACCINE ADMINISTRATION, INITIAL  -     PNEUMOCOCCAL VACCINE,ADULT,SQ OR IM    End of Life Planning:  Patient currently has an advanced directive: No.  I have verified the patient's ablity to prepare an advanced directive/make health care decisions.  Literature was provided to assist patient in preparing an advanced directive.    COUNSELING:  Reviewed preventive health counseling, as reflected in patient instructions    Estimated body mass index is 30.71 kg/m  as calculated from the following:    Height as of this encounter: 1.715 m (5' 7.5\").    Weight as of this encounter: 90.3 kg (199 lb).     reports that she quit smoking about 18 months ago. Her smoking use included cigarettes. She has a 27.90 pack-year smoking history. She has never used smokeless tobacco.    Appropriate preventive services were discussed with this patient, including applicable screening as appropriate for cardiovascular disease, diabetes, osteopenia/osteoporosis, and glaucoma.  As appropriate for age/gender, discussed screening for colorectal cancer, prostate cancer, breast cancer, and cervical cancer. Checklist reviewing preventive services available has been given to the patient.    Reviewed patients plan of care and provided an AVS. The Basic Care Plan (routine screening as documented in Health Maintenance) for Miroslava meets the Care Plan requirement. This Care Plan has been established and reviewed with the Patient.    Counseling Resources:  ATP IV Guidelines  Pooled Cohorts Equation Calculator  Breast Cancer Risk Calculator  FRAX Risk Assessment  ICSI Preventive Guidelines  Dietary Guidelines for Americans, 2010  USDA's MyPlate  ASA Prophylaxis  Lung CA Screening    The information in this document, created by the medical " scribe for me, accurately reflects the services I personally performed and the decisions made by me. I have reviewed and approved this document for accuracy prior to leaving the patient care area.  12:35 PM, 01/09/20    Rome Perdue MD  [facility] [place]

## 2020-01-09 ENCOUNTER — OFFICE VISIT (OUTPATIENT)
Dept: FAMILY MEDICINE | Facility: CLINIC | Age: 70
End: 2020-01-09
Payer: MEDICARE

## 2020-01-09 VITALS
WEIGHT: 199 LBS | OXYGEN SATURATION: 95 % | TEMPERATURE: 98.1 F | SYSTOLIC BLOOD PRESSURE: 160 MMHG | HEIGHT: 68 IN | DIASTOLIC BLOOD PRESSURE: 70 MMHG | BODY MASS INDEX: 30.16 KG/M2 | HEART RATE: 88 BPM

## 2020-01-09 DIAGNOSIS — Z12.11 SCREENING FOR COLON CANCER: ICD-10-CM

## 2020-01-09 DIAGNOSIS — R79.9 ABNORMAL FINDING OF BLOOD CHEMISTRY, UNSPECIFIED: ICD-10-CM

## 2020-01-09 DIAGNOSIS — F43.23 SITUATIONAL MIXED ANXIETY AND DEPRESSIVE DISORDER: ICD-10-CM

## 2020-01-09 DIAGNOSIS — I10 HYPERTENSION GOAL BP (BLOOD PRESSURE) < 130/80: ICD-10-CM

## 2020-01-09 DIAGNOSIS — Z23 ENCOUNTER FOR IMMUNIZATION: ICD-10-CM

## 2020-01-09 DIAGNOSIS — N18.30 CKD (CHRONIC KIDNEY DISEASE) STAGE 3, GFR 30-59 ML/MIN (H): ICD-10-CM

## 2020-01-09 DIAGNOSIS — Z00.00 MEDICARE ANNUAL WELLNESS VISIT, SUBSEQUENT: Primary | ICD-10-CM

## 2020-01-09 DIAGNOSIS — E78.5 HYPERLIPIDEMIA LDL GOAL <100: ICD-10-CM

## 2020-01-09 LAB
ERYTHROCYTE [DISTWIDTH] IN BLOOD BY AUTOMATED COUNT: 13.4 % (ref 10–15)
HCT VFR BLD AUTO: 40.1 % (ref 35–47)
HGB BLD-MCNC: 13.2 G/DL (ref 11.7–15.7)
MCH RBC QN AUTO: 31.4 PG (ref 26.5–33)
MCHC RBC AUTO-ENTMCNC: 32.9 G/DL (ref 31.5–36.5)
MCV RBC AUTO: 95 FL (ref 78–100)
PLATELET # BLD AUTO: 309 10E9/L (ref 150–450)
RBC # BLD AUTO: 4.21 10E12/L (ref 3.8–5.2)
WBC # BLD AUTO: 10 10E9/L (ref 4–11)

## 2020-01-09 PROCEDURE — 80053 COMPREHEN METABOLIC PANEL: CPT | Performed by: FAMILY MEDICINE

## 2020-01-09 PROCEDURE — 85027 COMPLETE CBC AUTOMATED: CPT | Performed by: FAMILY MEDICINE

## 2020-01-09 PROCEDURE — 99214 OFFICE O/P EST MOD 30 MIN: CPT | Mod: 25 | Performed by: FAMILY MEDICINE

## 2020-01-09 PROCEDURE — 90732 PPSV23 VACC 2 YRS+ SUBQ/IM: CPT | Performed by: FAMILY MEDICINE

## 2020-01-09 PROCEDURE — 36415 COLL VENOUS BLD VENIPUNCTURE: CPT | Performed by: FAMILY MEDICINE

## 2020-01-09 PROCEDURE — 80061 LIPID PANEL: CPT | Performed by: FAMILY MEDICINE

## 2020-01-09 PROCEDURE — 82043 UR ALBUMIN QUANTITATIVE: CPT | Performed by: FAMILY MEDICINE

## 2020-01-09 PROCEDURE — G0009 ADMIN PNEUMOCOCCAL VACCINE: HCPCS | Performed by: FAMILY MEDICINE

## 2020-01-09 PROCEDURE — 84443 ASSAY THYROID STIM HORMONE: CPT | Performed by: FAMILY MEDICINE

## 2020-01-09 PROCEDURE — G0438 PPPS, INITIAL VISIT: HCPCS | Performed by: FAMILY MEDICINE

## 2020-01-09 RX ORDER — TRIAMTERENE AND HYDROCHLOROTHIAZIDE 37.5; 25 MG/1; MG/1
CAPSULE ORAL
Qty: 90 CAPSULE | Refills: 3 | Status: SHIPPED | OUTPATIENT
Start: 2020-01-09 | End: 2021-01-25

## 2020-01-09 RX ORDER — FLUOXETINE 10 MG/1
10 CAPSULE ORAL DAILY
Qty: 90 CAPSULE | Refills: 1 | Status: SHIPPED | OUTPATIENT
Start: 2020-01-09 | End: 2020-07-01

## 2020-01-09 RX ORDER — METOPROLOL SUCCINATE 50 MG/1
50 TABLET, EXTENDED RELEASE ORAL DAILY
Qty: 90 TABLET | Refills: 3 | Status: SHIPPED | OUTPATIENT
Start: 2020-01-09 | End: 2020-12-18

## 2020-01-09 RX ORDER — LOSARTAN POTASSIUM 50 MG/1
50 TABLET ORAL DAILY
Qty: 90 TABLET | Refills: 1 | Status: SHIPPED | OUTPATIENT
Start: 2020-01-09 | End: 2020-07-29

## 2020-01-09 RX ORDER — AMLODIPINE BESYLATE 10 MG/1
10 TABLET ORAL DAILY
Qty: 90 TABLET | Refills: 3 | Status: SHIPPED | OUTPATIENT
Start: 2020-01-09 | End: 2020-12-19

## 2020-01-09 ASSESSMENT — ANXIETY QUESTIONNAIRES
6. BECOMING EASILY ANNOYED OR IRRITABLE: NOT AT ALL
IF YOU CHECKED OFF ANY PROBLEMS ON THIS QUESTIONNAIRE, HOW DIFFICULT HAVE THESE PROBLEMS MADE IT FOR YOU TO DO YOUR WORK, TAKE CARE OF THINGS AT HOME, OR GET ALONG WITH OTHER PEOPLE: SOMEWHAT DIFFICULT
3. WORRYING TOO MUCH ABOUT DIFFERENT THINGS: NEARLY EVERY DAY
GAD7 TOTAL SCORE: 11
5. BEING SO RESTLESS THAT IT IS HARD TO SIT STILL: NOT AT ALL
1. FEELING NERVOUS, ANXIOUS, OR ON EDGE: MORE THAN HALF THE DAYS
2. NOT BEING ABLE TO STOP OR CONTROL WORRYING: NEARLY EVERY DAY
7. FEELING AFRAID AS IF SOMETHING AWFUL MIGHT HAPPEN: MORE THAN HALF THE DAYS

## 2020-01-09 ASSESSMENT — MIFFLIN-ST. JEOR: SCORE: 1478.22

## 2020-01-09 ASSESSMENT — ACTIVITIES OF DAILY LIVING (ADL): CURRENT_FUNCTION: NO ASSISTANCE NEEDED

## 2020-01-09 ASSESSMENT — PATIENT HEALTH QUESTIONNAIRE - PHQ9
SUM OF ALL RESPONSES TO PHQ QUESTIONS 1-9: 15
5. POOR APPETITE OR OVEREATING: SEVERAL DAYS

## 2020-01-10 LAB
ALBUMIN SERPL-MCNC: 4.1 G/DL (ref 3.4–5)
ALP SERPL-CCNC: 100 U/L (ref 40–150)
ALT SERPL W P-5'-P-CCNC: 43 U/L (ref 0–50)
ANION GAP SERPL CALCULATED.3IONS-SCNC: 6 MMOL/L (ref 3–14)
AST SERPL W P-5'-P-CCNC: 19 U/L (ref 0–45)
BILIRUB SERPL-MCNC: 0.4 MG/DL (ref 0.2–1.3)
BUN SERPL-MCNC: 34 MG/DL (ref 7–30)
CALCIUM SERPL-MCNC: 9.4 MG/DL (ref 8.5–10.1)
CHLORIDE SERPL-SCNC: 105 MMOL/L (ref 94–109)
CHOLEST SERPL-MCNC: 289 MG/DL
CO2 SERPL-SCNC: 26 MMOL/L (ref 20–32)
CREAT SERPL-MCNC: 1.44 MG/DL (ref 0.52–1.04)
CREAT UR-MCNC: 215 MG/DL
GFR SERPL CREATININE-BSD FRML MDRD: 37 ML/MIN/{1.73_M2}
GLUCOSE SERPL-MCNC: 102 MG/DL (ref 70–99)
HDLC SERPL-MCNC: 34 MG/DL
LDLC SERPL CALC-MCNC: 190 MG/DL
MICROALBUMIN UR-MCNC: 52 MG/L
MICROALBUMIN/CREAT UR: 24.42 MG/G CR (ref 0–25)
NONHDLC SERPL-MCNC: 255 MG/DL
POTASSIUM SERPL-SCNC: 4.3 MMOL/L (ref 3.4–5.3)
PROT SERPL-MCNC: 8.1 G/DL (ref 6.8–8.8)
SODIUM SERPL-SCNC: 137 MMOL/L (ref 133–144)
TRIGL SERPL-MCNC: 324 MG/DL
TSH SERPL DL<=0.005 MIU/L-ACNC: 1.1 MU/L (ref 0.4–4)

## 2020-01-10 ASSESSMENT — ASTHMA QUESTIONNAIRES: ACT_TOTALSCORE: 22

## 2020-01-10 ASSESSMENT — ANXIETY QUESTIONNAIRES: GAD7 TOTAL SCORE: 11

## 2020-01-14 RX ORDER — ROSUVASTATIN CALCIUM 5 MG/1
5 TABLET, COATED ORAL DAILY
Qty: 90 TABLET | Refills: 3 | Status: SHIPPED | OUTPATIENT
Start: 2020-01-14 | End: 2020-12-18

## 2020-01-14 NOTE — RESULT ENCOUNTER NOTE
Note to Staff:call her about trying rosuvastatin (tried atorvastatin last year and had some side effects) and please send a result letter    -Normal red blood cell (hgb) levels, normal white blood cell count and normal platelet levels.  -LDL(bad) cholesterol level is elevated, HDL(good) cholesterol level is low and your triglycerides are elevated which can increase your heart disease risk.  A diet high in fat and simple carbohydrates, genetics and being overweight can contribute to this. ADVISE: exercising 150 minutes of aerobic exercise per week (30 minutes for 5 days per week or 50 minutes for 3 days per week are options), eating a low saturated fat/low carbohydrate diet, and omega-3 fatty acids (fish oil) 1556-7905 mg daily are helpful to improve this. Current guidelines from the American Heart Association support starting a cholesterol lowering medication to lower your heart and stroke disease risk.  I am sending a prescription to your pharmacy: rosuvastatin(Crestor) 5 mg each evening.  You can call your pharmacy to let them know you would like your prescription filled and if you have concerns about this recommendation then please contact me. In 3 months, you should recheck your fasting cholesterol panel by  scheduling a lab-only appointment.    -18.9% of patients that have a similar profile to you will have a stroke, heart attack or death (related to heart disease) within the next 10 years and that is considered a high risk and cholesterol lowering medications are recommended at this time. (high risk is >10%, or >7.5% if other risk factors such has high blood pressure or other heart disease risk factors)  The ASCVD risk score (Ana RAMSAY Jr, et al., 2013) returns the percentage likelihood of a first time Atherosclerotic Cardiovascular Disease (ASCVD) event.    The 10-year ASCVD risk score (Ana RAMSAY Jr., et al., 2013) is: 18.9%    Values used to calculate the score:      Age: 69 years      Sex: Female      Is  Non- : No      Diabetic: No      Tobacco smoker: No      Systolic Blood Pressure: 160 mmHg      Is BP treated: Yes      HDL Cholesterol: 34 mg/dL      Total Cholesterol: 289 mg/dL     -Liver and gallbladder tests (ALT,AST, Alk phos,bilirubin) are normal.  -Kidney function (GFR) is decreased.  ADVISE: rechecking this in 6 months  -Sodium is normal.  -Potassium is normal.  -Calcium is normal.  -Glucose is slight elevated and may be a sign of early diabetes (prediabetes). ADVISE:: eating a low carbohydrate diet, exercising, trying to lose weight (if necessary) and rechecking your glucose level in 12 months.  -TSH (thyroid stimulating hormone) level is normal which indicates normal thyroid function.  -Microalbumin (urine protein) test is normal.  ADVISE: rechecking this annually.     For additional lab test information, labtestsonline.org is an excellent reference.

## 2020-01-16 ENCOUNTER — ALLIED HEALTH/NURSE VISIT (OUTPATIENT)
Dept: NURSING | Facility: CLINIC | Age: 70
End: 2020-01-16
Payer: MEDICARE

## 2020-01-16 VITALS
HEART RATE: 65 BPM | RESPIRATION RATE: 16 BRPM | SYSTOLIC BLOOD PRESSURE: 136 MMHG | WEIGHT: 199.8 LBS | DIASTOLIC BLOOD PRESSURE: 72 MMHG | BODY MASS INDEX: 30.83 KG/M2 | OXYGEN SATURATION: 92 %

## 2020-01-16 DIAGNOSIS — I10 HYPERTENSION, UNSPECIFIED TYPE: Primary | ICD-10-CM

## 2020-01-16 RX ORDER — FLUOXETINE 10 MG/1
10 CAPSULE ORAL DAILY
COMMUNITY
Start: 2020-01-16 | End: 2020-02-06

## 2020-01-16 RX ORDER — POTASSIUM CHLORIDE 1500 MG/1
20 TABLET, EXTENDED RELEASE ORAL DAILY
COMMUNITY
Start: 2020-01-16 | End: 2021-01-25

## 2020-01-16 RX ORDER — CHOLECALCIFEROL (VITAMIN D3) 50 MCG
1 TABLET ORAL DAILY
COMMUNITY
Start: 2020-01-16 | End: 2020-02-06

## 2020-01-16 ASSESSMENT — PAIN SCALES - GENERAL: PAINLEVEL: NO PAIN (0)

## 2020-01-16 NOTE — PATIENT INSTRUCTIONS
Patient Education     Patient Education    D-Alpha Tochopheryl Acetate (Vitamin E) Oral capsule    D-Alpha Tochopheryl Acetate (Vitamin E) Oral capsule, liquid filled    D-Alpha Tochopheryl Acetate (Vitamin E) Topical oil    D-Alpha Tocopherol (Vitamin E) (Natural) Oral capsule    Dl-Alpha Tocopheryl Acetate (Vitamin E) Oral capsule    Dl-Alpha Tocopheryl Acetate (Vitamin E) Oral capsule, liquid filled    Dl-Alpha Tocopheryl Acetate (Vitamin E) Topical oil    Tocopherol (Vitamin E) Oral capsule    Tocopherol Acetate (Vitamin E) Topical cream    Vitamin E Oral capsule    Vitamin E Oral capsule, liquid filled    Vitamin E Oral drops, solution    Vitamin E Topical cream  Vitamin E Topical cream  What is this medicine?  VITAMIN E (VAFER hill min E) is a vitamin that is being promoted for its ability to protect against aging of the skin caused by ultraviolet light, such as sunlight and to aid in the healing of minor burns and sunburns. These claims have not been evaluated by the FDA at this time. Vitamin E is a naturally occurring vitamin that is found in many foods such as cereal grains, fruits, green leafy vegetables, vegetable oils, and wheat germ oil.  This medicine may be used for other purposes; ask your health care provider or pharmacist if you have questions.  What should I tell my health care provider before I take this medicine?  They need to know if you have any of these conditions:    an unusual or allergic reaction to Vitamin E, other medicines, foods, dyes, or preservatives    pregnant or trying to get pregnant    breast-feeding  How should I use this medicine?  This medicine is for external use only. Do not take by mouth. Follow the directions on the label. Avoid contact with the eyes. If contact occurs, rinse the eyes well with plenty of cool tap water.  Talk to your pediatrician regarding the use of this medicine in children. Special care may be needed.  Overdosage: If you think you have taken too much  of this medicine contact a poison control center or emergency room at once.  NOTE: This medicine is only for you. Do not share this medicine with others.  What if I miss a dose?  This does not apply.  What may interact with this medicine?  Interactions are not expected.  This list may not describe all possible interactions. Give your health care provider a list of all the medicines, herbs, non-prescription drugs, or dietary supplements you use. Also tell them if you smoke, drink alcohol, or use illegal drugs. Some items may interact with your medicine.  What should I watch for while using this medicine?  When used to reduce fine facial lines, you may not notice any improvements right away. it may take several months to see any claimed effects. This product may not work for everyone who uses it.  If skin irritation occurs while using this product, stop using it.  There is no evidence at this time that Vitamin E skin products aid in healing of minor burns or sunburn. Use only if directed by your doctor or health care professional.  What side effects may I notice from receiving this medicine?  Side effects that you should report to your doctor or health care professional as soon as possible:    allergic reactions like skin rash, itching or hives, swelling of the face, lips, or tongue    severe burning, irritation, crusting, or swelling of the treated areas  Side effects that usually do not require medical attention (report to your doctor or health care professional if they continue or are bothersome):    mild skin irritation  This list may not describe all possible side effects. Call your doctor for medical advice about side effects. You may report side effects to FDA at 7-294-FDA-2331.  Where should I keep my medicine?  Keep out of the reach of children.  Store at room temperature between 15 and 30 degrees C (59 and 86 degrees F). Throw away any unused medicine after the expiration date.  NOTE:This sheet is a summary.  It may not cover all possible information. If you have questions about this medicine, talk to your doctor, pharmacist, or health care provider. Copyright  2016 Gold Standard           Patient Education     Eating Heart-Healthy Food: Using the DASH Plan    Eating for your heart doesn t have to be hard or boring. You just need to know how to make healthier choices. The DASH eating plan has been developed to help you do just that. DASH stands for Dietary Approaches to Stop Hypertension. It is a plan that has been proven to be healthier for your heart and to lower your risk for high blood pressure. It can also help lower your risk for cancer, heart disease, osteoporosis, and diabetes.  Choosing from each food group  Choose foods from each of the food groups below each day. Try to get the recommended number of servings for each food group. The serving numbers are based on a diet of 2,000 calories a day. Talk to your doctor if you re unsure about your calorie needs. Along with getting the correct servings, the DASH plan also recommends a sodium intake less than 2,300 mg per day.        Grains  Servings: 6 to 8 a day  A serving is:    1 slice bread    1 ounce dry cereal    Half a cup cooked rice, pasta or cereal  Best choices: Whole grains and any grains high in fiber. Vegetables  Servings: 4 to 5 a day  A serving is:    1 cup raw leafy vegetable    Half a cup cut-up raw or cooked vegetable    Half a cup vegetable juice  Best choices: Fresh or frozen vegetables prepared without added salt or fat.   Fruits  Servings: 4 to 5 a day  A serving is:    1 medium fruit    One-quarter cup dried fruit    Half a cup fresh, frozen, or canned fruit    Half a cup of 100% fruit juices  Best choices: A variety of fresh fruits of different colors. Whole fruits are a better choice than fruit juices. Low-fat or fat-free dairy  Servings: 2 to 3 a day  A serving is:    1 cup milk    1 cup yogurt    One and a half ounces cheese  Best choices:  Skim or 1% milk, low-fat or fat-free yogurt or buttermilk, and low-fat cheeses.         Lean meats, poultry, fish  Servings: 6 or fewer a day  A serving is:    1 ounce cooked meats, poultry, or fish    1 egg  Best choices: Lean poultry and fish. Trim away visible fat. Broil, grill, roast, or boil instead of frying. Remove skin from poultry before eating. Limit how much red meat you eat.  Nuts, seeds, beans  Servings: 4 to 5 a week  A serving is:    One-third cup nuts (one and a half ounces)    2 tablespoons nut butter or seeds    Half a cup cooked dry beans or legumes  Best choices: Dry roasted nuts with no salt added, lentils, kidney beans, garbanzo beans, and whole lew beans.   Fats and oils  Servings: 2 to 3 a day  A serving is:    1 teaspoon vegetable oil    1 teaspoon soft margarine    1 tablespoon mayonnaise    2 tablespoons salad dressing  Best choices: Nut and vegetable oils (nontropical vegetable oils), such as olive and canola oil. Sweets  Servings: 5 a week or fewer  A serving is:    1 tablespoon sugar, maple syrup, or honey    1 tablespoon jam or jelly    1 half-ounce jelly beans (about 15)    1 cup lemonade  Best choices: Dried fruit can be a satisfying sweet. Choose low-fat sweets. And watch your serving sizes!      For more on the DASH eating plan, visit:  www.nhlbi.nih.gov/health/health-topics/topics/dash   Date Last Reviewed: 6/1/2016 2000-2019 [employer]. [address] [place] [address], [place] [address]. All rights reserved. This information is not intended as a substitute for professional medical care. Always follow your healthcare professional's instructions.           Patient Education     Eating Heart-Healthy Foods  Eating has a big impact on your heart health. In fact, eating healthier can improve several of your heart risks at once. For instance, it helps you manage weight, cholesterol, and blood pressure. Here are ideas to help you make heart-healthy changes without giving up  all the foods and flavors you love.  Getting started    Talk with your healthcare provider about eating plans, such as the DASH or Mediterranean diet. You may also be referred to a dietitian.    Change a few things at a time. Give yourself time to get used to a few eating changes before adding more.    Work to create a tasty, healthy eating plan that you can stick to for the rest of your life.    Goals for healthy eating  Below are some tips to improve your eating habits:    Limit saturated fats and trans fats. Saturated fats raise your levels of cholesterol, so keep these fats to a minimum. They are found in foods such as fatty meats, whole milk, cheese, and palm and coconut oils. Avoid trans fats because they lower good cholesterol as well as raise bad cholesterol. Trans fats are most often found in processed foods.    Reduce sodium (salt) intake. Eating too much salt may increase your blood pressure. Limit your sodium intake to 2,300 milligrams (mg) per day (the amount in 1 teaspoon of salt), or less if your healthcare provider recommends it. Dining out less often and eating fewer processed foods are two great ways to decrease the amount of salt you consume.    Managing calories. A calorie is a unit of energy. Your body burns calories for fuel, but if you eat more calories than your body burns, the extras are stored as fat. Your healthcare provider can help you create a diet plan to manage your calories. This will likely include eating healthier foods as well as exercising regularly. To help you track your progress, keep a diary to record what you eat and how often you exercise.  Choose the right foods  Aim to make these foods staples of your diet. If you have diabetes, you may have different recommendations than what is listed here:    Fruits and vegetables provide plenty of nutrients without a lot of calories. At meals, fill half your plate with these foods. Split the other half of your plate between whole  grains and lean protein.    Whole grains are high in fiber and rich in vitamins and nutrients. Good choices include whole-wheat bread, pasta, and brown rice.    Lean proteins give you nutrition with less fat. Good choices include fish, skinless chicken, and beans.    Low-fat or nonfat dairy provides nutrients without a lot of fat. Try low-fat or nonfat milk, cheese, or yogurt.    Healthy fats can be good for you in small amounts. These are unsaturated fats, such as olive oil, nuts, and fish. Try to have at least 2 servings per week of fatty fish, such as salmon, sardines, mackerel, rainbow trout, and albacore tuna. These contain omega-3 fatty acids, which are good for your heart. Flaxseed is another source of a heart-healthy fat.  More on heart-healthy eating  Read food labels  Healthy eating starts at the grocery store. Be sure to pay attention to food labels on packaged foods. Look for products that are high in fiber and protein, and low in saturated fat, cholesterol, and sodium. Avoid products that contain trans fat. And pay close attention to serving size. For instance, if you plan to eat two servings, double all the numbers on the label.  Prepare food right  A key part of healthy cooking is cutting down on added fat and salt. Look on the internet for lower-fat, lower-sodium recipes. Also, try these tips:    Remove fat from meat and skin from poultry before cooking.    Skim fat from the surface of soups and sauces.    Broil, boil, bake, steam, grill, and microwave food without added fats.    Choose ingredients that spice up your food without adding calories, fat, or sodium. Try these items: horseradish, hot sauce, lemon, mustard, nonfat salad dressings, and vinegar. For salt-free herbs and spices, try basil, cilantro, cinnamon, pepper, and rosemary.  Date Last Reviewed: 10/1/2017    4563-3255 [employer]. [address] [place] [address], [place] [address]. All rights reserved. This information is not  intended as a substitute for professional medical care. Always follow your healthcare professional's instructions.

## 2020-01-16 NOTE — PROGRESS NOTES
Hypertension Follow-up      Do you check your blood pressure regularly outside of the clinic? Yes     Are you following a low salt diet? Yes    Are your blood pressures ever more than 140 on the top number (systolic) OR more   than 90 on the bottom number (diastolic), for example 140/90? Yes      How many servings of fruits and vegetables do you eat daily?  0-1    On average, how many sweetened beverages do you drink each day (Examples: soda, juice, sweet tea, etc.  Do NOT count diet or artificially sweetened beverages)?   0    How many days per week do you exercise enough to make your heart beat faster? 3 or less    How many minutes a day do you exercise enough to make your heart beat faster? 10 - 19    How many days per week do you miss taking your medication? 0     Wt Readings from Last 2 Encounters:   01/16/20 90.6 kg (199 lb 12.8 oz)   01/09/20 90.3 kg (199 lb)     BP Readings from Last 6 Encounters:   01/16/20 (!) 148/65   01/09/20 (!) 160/70   10/31/19 (!) 151/80   12/03/18 144/70   11/16/18 128/70   11/06/18 150/86       DENIES: CP, SOB, Difficulty Breathing, Dizziness/Lightheadedness, Numbness/Tingling, HA, Vision/Hearing Changes, N/V, Palpitations      Neil Deshpande RN   [facility]

## 2020-01-26 DIAGNOSIS — I10 HYPERTENSION GOAL BP (BLOOD PRESSURE) < 130/80: ICD-10-CM

## 2020-01-27 NOTE — TELEPHONE ENCOUNTER
"Requested Prescriptions   Pending Prescriptions Disp Refills     losartan (COZAAR) 25 MG tablet [Pharmacy Med Name: LOSARTAN POTASSIUM 25 MG TAB]        Last Written Prescription Date:  1.9.20  Last Fill Quantity: 90 tablet,  # refills: 1   Last office visit: 1/9/2020 with prescribing provider:  Rome Perdue MD     Future Office Visit:   Next 5 appointments (look out 90 days)    Feb 06, 2020 11:00 AM CST  SHORT with Rome Perdue MD  [facility] [address] [place] [id number] [phone]            180 tablet 0     Sig: TAKE 2 TABLETS BY MOUTH EVERY DAY       Angiotensin-II Receptors Failed - 1/26/2020 10:02 AM        Failed - Normal serum creatinine on file in past 12 months     Recent Labs   Lab Test 01/09/20  1249  06/11/18  1342   CR 1.44*   < >  --    CREAT  --   --  0.7    < > = values in this interval not displayed.             Passed - Last blood pressure under 140/90 in past 12 months     BP Readings from Last 3 Encounters:   01/16/20 136/72   01/09/20 (!) 160/70   10/31/19 (!) 151/80                 Passed - Recent (12 mo) or future (30 days) visit within the authorizing provider's specialty     Patient has had an office visit with the authorizing provider or a provider within the authorizing providers department within the previous 12 mos or has a future within next 30 days. See \"Patient Info\" tab in inbasket, or \"Choose Columns\" in Meds & Orders section of the refill encounter.              Passed - Medication is active on med list        Passed - Patient is age 18 or older        Passed - No active pregnancy on record        Passed - Normal serum potassium on file in past 12 months     Recent Labs   Lab Test 01/09/20  1249   POTASSIUM 4.3                    Passed - No positive pregnancy test in past 12 months        "

## 2020-01-28 RX ORDER — LOSARTAN POTASSIUM 25 MG/1
TABLET ORAL
Qty: 180 TABLET | Refills: 0 | Status: SHIPPED | OUTPATIENT
Start: 2020-01-28 | End: 2020-02-06

## 2020-01-28 NOTE — TELEPHONE ENCOUNTER
Pres[place] per [facility] [facility].    Neil Deshpande RN   [facility]

## 2020-02-06 ENCOUNTER — OFFICE VISIT (OUTPATIENT)
Dept: FAMILY MEDICINE | Facility: CLINIC | Age: 70
End: 2020-02-06
Payer: MEDICARE

## 2020-02-06 VITALS
SYSTOLIC BLOOD PRESSURE: 110 MMHG | WEIGHT: 194 LBS | HEIGHT: 68 IN | HEART RATE: 75 BPM | TEMPERATURE: 98 F | BODY MASS INDEX: 29.4 KG/M2 | OXYGEN SATURATION: 95 % | DIASTOLIC BLOOD PRESSURE: 70 MMHG

## 2020-02-06 DIAGNOSIS — Z13.820 SCREENING FOR OSTEOPOROSIS: ICD-10-CM

## 2020-02-06 DIAGNOSIS — E78.5 HYPERLIPIDEMIA LDL GOAL <100: ICD-10-CM

## 2020-02-06 DIAGNOSIS — F43.23 SITUATIONAL MIXED ANXIETY AND DEPRESSIVE DISORDER: Primary | ICD-10-CM

## 2020-02-06 DIAGNOSIS — Z78.0 ASYMPTOMATIC MENOPAUSAL STATE: ICD-10-CM

## 2020-02-06 DIAGNOSIS — L30.9 DERMATITIS: ICD-10-CM

## 2020-02-06 PROCEDURE — 99214 OFFICE O/P EST MOD 30 MIN: CPT | Performed by: FAMILY MEDICINE

## 2020-02-06 RX ORDER — CHOLECALCIFEROL (VITAMIN D3) 50 MCG
2 TABLET ORAL DAILY
COMMUNITY
Start: 2020-02-06

## 2020-02-06 ASSESSMENT — ANXIETY QUESTIONNAIRES
GAD7 TOTAL SCORE: 4
7. FEELING AFRAID AS IF SOMETHING AWFUL MIGHT HAPPEN: NOT AT ALL
6. BECOMING EASILY ANNOYED OR IRRITABLE: NOT AT ALL
3. WORRYING TOO MUCH ABOUT DIFFERENT THINGS: SEVERAL DAYS
5. BEING SO RESTLESS THAT IT IS HARD TO SIT STILL: NOT AT ALL
1. FEELING NERVOUS, ANXIOUS, OR ON EDGE: SEVERAL DAYS
2. NOT BEING ABLE TO STOP OR CONTROL WORRYING: SEVERAL DAYS
IF YOU CHECKED OFF ANY PROBLEMS ON THIS QUESTIONNAIRE, HOW DIFFICULT HAVE THESE PROBLEMS MADE IT FOR YOU TO DO YOUR WORK, TAKE CARE OF THINGS AT HOME, OR GET ALONG WITH OTHER PEOPLE: NOT DIFFICULT AT ALL

## 2020-02-06 ASSESSMENT — PATIENT HEALTH QUESTIONNAIRE - PHQ9
SUM OF ALL RESPONSES TO PHQ QUESTIONS 1-9: 5
5. POOR APPETITE OR OVEREATING: SEVERAL DAYS

## 2020-02-06 ASSESSMENT — MIFFLIN-ST. JEOR: SCORE: 1450.54

## 2020-02-06 NOTE — PROGRESS NOTES
Subjective   Miroslava Handy is a 70 year old female who presents to clinic today for the following health issues:    HPI   Hyperlipidemia Follow-Up    Are you regularly taking any medication or supplement to lower your cholesterol?   Yes- crestor    Are you having muscle aches or other side effects that you think could be caused by your cholesterol lowering medication?  Yes- fish oil    Hypertension Follow-up    Do you check your blood pressure regularly outside of the clinic? Once in a while     Are you following a low salt diet? Yes    Are your blood pressures ever more than 140 on the top number (systolic) OR more   than 90 on the bottom number (diastolic), for example 140/90? Does not check often    Depression and Anxiety Follow-Up    How are you doing with your depression since your last visit? Improved     How are you doing with your anxiety since your last visit?  No change    Are you having other symptoms that might be associated with depression or anxiety? No    Have you had a significant life event? No     Do you have any concerns with your use of alcohol or other drugs? No  Medication helps. She is less anxious so she eats less. Couple weeks ago she got more energy and she got overwhelmed and wanted to do everything at once.     Dermatitis  She has a flare up on the back of her left ankle.     Social History     Tobacco Use     Smoking status: Former Smoker     Packs/day: 0.90     Years: 31.00     Pack years: 27.90     Types: Cigarettes     Last attempt to quit: 2018     Years since quittin.6     Smokeless tobacco: Never Used   Substance Use Topics     Alcohol use: Yes     Comment: 1 glass of wine per month     Drug use: No     PHQ 2020   PHQ-9 Total Score 15   Q9: Thoughts of better off dead/self-harm past 2 weeks Several days   F/U: Thoughts of suicide or self-harm No     OBINNA-7 SCORE 2020   Total Score 11     Suicide Assessment Five-step Evaluation and Treatment (SAFE-T)      Reviewed  "and updated as needed this visit by provider:  Tobacco  Allergies  Meds  Problems  Med Hx  Surg Hx  Fam Hx       Zoster vaccine will be checked with insurance for coverage.     Review of Systems   Constitutional, HEENT, cardiovascular, pulmonary, GI, , musculoskeletal, neuro, skin, endocrine and psych systems are negative, except as otherwise noted.    This document serves as a record of the services and decisions personally performed and made by Rome Perdue MD. It was created on his behalf by Leonel Ndiaye, a trained medical scribe. The creation of this document is based the provider's statements to the medical scribe.  Scribe Leonel Ndiaye 11:21 AM, February 6, 2020        Objective   /70   Pulse 75   Temp 98  F (36.7  C) (Oral)   Ht 1.715 m (5' 7.5\")   Wt 88 kg (194 lb)   SpO2 95%   BMI 29.94 kg/m   Body mass index is 29.94 kg/m .  Physical Exam   GENERAL: healthy, alert, well nourished, well hydrated, no distress  EYES: Eyes grossly normal to inspection, extraocular movements - intact, and PERRL  HENT: ear canals- normal; TMs- normal; Nose- normal; Mouth- no ulcers, no lesions  NECK: no tenderness, no adenopathy, no asymmetry, no masses, no stiffness; thyroid- normal to palpation  RESP: lungs clear to auscultation - no rales, no rhonchi, no wheezes  CV: regular rates and rhythm, normal S1 S2, no S3 or S4 and no murmur, no click or rub -  ABDOMEN: soft, no tenderness, no  hepatosplenomegaly, no masses, normal bowel sounds  MS: rash near left achilles, otherwise, extremities- no gross deformities noted, no edema  NEURO: strength and tone- normal, sensory exam- grossly normal, mentation- intact, speech- normal, reflexes- symmetric  PSYCH: Alert and oriented times 3; speech- coherent , normal rate and volume; able to articulate logical thoughts, able to abstract reason, no tangential thoughts, no hallucinations or delusions, affect- normal  LYMPHATICS: ant. cervical- normal, post. cervical- " normal, axillary- normal, supraclavicular- normal, inguinal- normal        Assessment & Plan   Miroslaav was seen today for recheck medication.    Diagnoses and all orders for this visit:   mood -  Doing better - will continue the  medication and recheck in 6 months.   Dermatitis - occasional flare ups. Start:  -     betamethasone valerate (VALISONE) 0.1 % external ointment; Apply topically 2 times daily    Hyperlipidemia LDL goal <100 - last LDL elevated. She is taking fish oil.     Screening for osteoporosis  -     DEXA HIP/PELVIS/SPINE - Future; Future    Asymptomatic menopausal state   -     DEXA HIP/PELVIS/SPINE - Future; Future      See Patient Instructions    Return in about 6 months (around 8/6/2020) for Medication Recheck.    The information in this document, created by the medical scribe for me, accurately reflects the services I personally performed and the decisions made by me. I have reviewed and approved this document for accuracy prior to leaving the patient care area.  11:36 AM, 02/06/20        Marcial Perdue MD      [address] [address]  [email or web address]   Office: [phone]  Pager: [phone]

## 2020-02-07 ASSESSMENT — ANXIETY QUESTIONNAIRES: GAD7 TOTAL SCORE: 4

## 2020-07-27 DIAGNOSIS — I10 HYPERTENSION GOAL BP (BLOOD PRESSURE) < 130/80: ICD-10-CM

## 2020-07-29 RX ORDER — LOSARTAN POTASSIUM 50 MG/1
TABLET ORAL
Qty: 90 TABLET | Refills: 1 | Status: SHIPPED | OUTPATIENT
Start: 2020-07-29 | End: 2021-01-18

## 2020-09-14 ENCOUNTER — ANCILLARY PROCEDURE (OUTPATIENT)
Dept: BONE DENSITY | Facility: CLINIC | Age: 70
End: 2020-09-14
Attending: FAMILY MEDICINE
Payer: MEDICARE

## 2020-09-14 DIAGNOSIS — Z78.0 ASYMPTOMATIC MENOPAUSAL STATE: ICD-10-CM

## 2020-09-14 DIAGNOSIS — Z13.820 SCREENING FOR OSTEOPOROSIS: ICD-10-CM

## 2020-09-14 PROCEDURE — 77080 DXA BONE DENSITY AXIAL: CPT | Performed by: INTERNAL MEDICINE

## 2020-09-17 NOTE — RESULT ENCOUNTER NOTE
Note to Staff: please send a result letter    Osteoporsis noted on bone density test (Dexa scan).  I would recommend an office visit to review treatment options.  
No

## 2020-12-17 DIAGNOSIS — F43.23 SITUATIONAL MIXED ANXIETY AND DEPRESSIVE DISORDER: ICD-10-CM

## 2020-12-17 DIAGNOSIS — I10 HYPERTENSION GOAL BP (BLOOD PRESSURE) < 130/80: ICD-10-CM

## 2020-12-18 NOTE — TELEPHONE ENCOUNTER
Routing refill request to provider for review/approval because:  Labs out of range:  Cr, phq  Labs not current:  phq  Patient needs to be seen because:  Past due for med check and no showed  Iggy Steiner RN, BSN

## 2020-12-19 RX ORDER — AMLODIPINE BESYLATE 10 MG/1
TABLET ORAL
Qty: 90 TABLET | Refills: 0 | Status: SHIPPED | OUTPATIENT
Start: 2020-12-19 | End: 2021-01-25

## 2020-12-19 RX ORDER — FLUOXETINE 10 MG/1
CAPSULE ORAL
Qty: 90 CAPSULE | Refills: 0 | Status: SHIPPED | OUTPATIENT
Start: 2020-12-19 | End: 2021-01-25

## 2020-12-19 NOTE — TELEPHONE ENCOUNTER
Due for wellness after 1/9/21 and also help her schedule a mammogram please.  1 refill sent for now.    Last visit in this dept:    2/6/2020     Next visit in this dept:       Health Maintenance   Topic Date Due     ANNUAL REVIEW OF HM ORDERS  1950     ZOSTER IMMUNIZATION (1 of 2) 01/19/2002     COLORECTAL CANCER SCREENING  06/15/2019     MAMMO SCREENING  06/21/2019     INFLUENZA VACCINE (1) 09/01/2020     MEDICARE ANNUAL WELLNESS VISIT  01/09/2021     BMP  01/09/2021     MICROALBUMIN  01/09/2021     FALL RISK ASSESSMENT  01/09/2021     LIPID  01/09/2025     ADVANCE CARE PLANNING  01/13/2025     DTAP/TDAP/TD IMMUNIZATION (2 - Td) 06/14/2028     DEXA  09/14/2035     HEPATITIS C SCREENING  Completed     PHQ-2  Completed     Pneumococcal Vaccine: 65+ Years  Completed     Pneumococcal Vaccine: Pediatrics (0 to 5 Years) and At-Risk Patients (6 to 64 Years)  Aged Out     IPV IMMUNIZATION  Aged Out     MENINGITIS IMMUNIZATION  Aged Out     HEPATITIS B IMMUNIZATION  Aged Out

## 2020-12-30 NOTE — TELEPHONE ENCOUNTER
Patient does not want to come into clinic for physical so scheduled a medicare annual wellness as a video visit.     Erica Dumont MA

## 2021-01-11 ENCOUNTER — TELEPHONE (OUTPATIENT)
Dept: FAMILY MEDICINE | Facility: CLINIC | Age: 71
End: 2021-01-11

## 2021-01-11 DIAGNOSIS — Z87.898 HISTORY OF FEVER: ICD-10-CM

## 2021-01-11 DIAGNOSIS — R05.9 COUGH: Primary | ICD-10-CM

## 2021-01-11 NOTE — TELEPHONE ENCOUNTER
Reason for Call:  Other call back    Detailed comments: Patient is requesting a covid-19 antibody test because she thinks she had it in February 2020    Phone Number Patient can be reached at: Cell number on file:    Telephone Information:   Mobile [phone]       Best Time: ANY    Can we leave a detailed message on this number? YES    Call taken on 1/11/2021 at 3:32 PM by Dominga Estes

## 2021-01-12 NOTE — TELEPHONE ENCOUNTER
Please see pended orders     Please advise     Thank you     Idania Reynolds RN, BSN  [facility] \

## 2021-01-25 ENCOUNTER — OFFICE VISIT (OUTPATIENT)
Dept: FAMILY MEDICINE | Facility: CLINIC | Age: 71
End: 2021-01-25
Payer: MEDICARE

## 2021-01-25 VITALS
TEMPERATURE: 96.6 F | HEIGHT: 68 IN | OXYGEN SATURATION: 97 % | WEIGHT: 197 LBS | HEART RATE: 80 BPM | DIASTOLIC BLOOD PRESSURE: 100 MMHG | BODY MASS INDEX: 29.86 KG/M2 | SYSTOLIC BLOOD PRESSURE: 140 MMHG

## 2021-01-25 DIAGNOSIS — Z12.11 SCREEN FOR COLON CANCER: ICD-10-CM

## 2021-01-25 DIAGNOSIS — I10 HYPERTENSION GOAL BP (BLOOD PRESSURE) < 130/80: ICD-10-CM

## 2021-01-25 DIAGNOSIS — Z12.31 ENCOUNTER FOR SCREENING MAMMOGRAM FOR MALIGNANT NEOPLASM OF BREAST: ICD-10-CM

## 2021-01-25 DIAGNOSIS — E78.5 HYPERLIPIDEMIA LDL GOAL <100: ICD-10-CM

## 2021-01-25 DIAGNOSIS — L30.9 DERMATITIS: ICD-10-CM

## 2021-01-25 DIAGNOSIS — Z00.00 ENCOUNTER FOR MEDICARE ANNUAL WELLNESS EXAM: Primary | ICD-10-CM

## 2021-01-25 DIAGNOSIS — N18.30 STAGE 3 CHRONIC KIDNEY DISEASE, UNSPECIFIED WHETHER STAGE 3A OR 3B CKD (H): ICD-10-CM

## 2021-01-25 DIAGNOSIS — F43.23 SITUATIONAL MIXED ANXIETY AND DEPRESSIVE DISORDER: ICD-10-CM

## 2021-01-25 LAB
ANION GAP SERPL CALCULATED.3IONS-SCNC: 5 MMOL/L (ref 3–14)
BUN SERPL-MCNC: 25 MG/DL (ref 7–30)
CALCIUM SERPL-MCNC: 8.9 MG/DL (ref 8.5–10.1)
CHLORIDE SERPL-SCNC: 109 MMOL/L (ref 94–109)
CHOLEST SERPL-MCNC: 176 MG/DL
CO2 SERPL-SCNC: 25 MMOL/L (ref 20–32)
CREAT SERPL-MCNC: 1.57 MG/DL (ref 0.52–1.04)
CREAT UR-MCNC: 150 MG/DL
ERYTHROCYTE [DISTWIDTH] IN BLOOD BY AUTOMATED COUNT: 12.5 % (ref 10–15)
GFR SERPL CREATININE-BSD FRML MDRD: 33 ML/MIN/{1.73_M2}
GLUCOSE SERPL-MCNC: 91 MG/DL (ref 70–99)
HCT VFR BLD AUTO: 39.7 % (ref 35–47)
HDLC SERPL-MCNC: 36 MG/DL
HGB BLD-MCNC: 12.6 G/DL (ref 11.7–15.7)
LDLC SERPL CALC-MCNC: 85 MG/DL
MCH RBC QN AUTO: 30.8 PG (ref 26.5–33)
MCHC RBC AUTO-ENTMCNC: 31.7 G/DL (ref 31.5–36.5)
MCV RBC AUTO: 97 FL (ref 78–100)
MICROALBUMIN UR-MCNC: 56 MG/L
MICROALBUMIN/CREAT UR: 37.4 MG/G CR (ref 0–25)
NONHDLC SERPL-MCNC: 140 MG/DL
PLATELET # BLD AUTO: 261 10E9/L (ref 150–450)
POTASSIUM SERPL-SCNC: 4.6 MMOL/L (ref 3.4–5.3)
RBC # BLD AUTO: 4.09 10E12/L (ref 3.8–5.2)
SODIUM SERPL-SCNC: 139 MMOL/L (ref 133–144)
TRIGL SERPL-MCNC: 274 MG/DL
WBC # BLD AUTO: 10.2 10E9/L (ref 4–11)

## 2021-01-25 PROCEDURE — 80061 LIPID PANEL: CPT | Performed by: FAMILY MEDICINE

## 2021-01-25 PROCEDURE — 82043 UR ALBUMIN QUANTITATIVE: CPT | Performed by: FAMILY MEDICINE

## 2021-01-25 PROCEDURE — G0439 PPPS, SUBSEQ VISIT: HCPCS | Performed by: FAMILY MEDICINE

## 2021-01-25 PROCEDURE — 85027 COMPLETE CBC AUTOMATED: CPT | Performed by: FAMILY MEDICINE

## 2021-01-25 PROCEDURE — 36415 COLL VENOUS BLD VENIPUNCTURE: CPT | Performed by: FAMILY MEDICINE

## 2021-01-25 PROCEDURE — 99214 OFFICE O/P EST MOD 30 MIN: CPT | Mod: 25 | Performed by: FAMILY MEDICINE

## 2021-01-25 PROCEDURE — 80048 BASIC METABOLIC PNL TOTAL CA: CPT | Performed by: FAMILY MEDICINE

## 2021-01-25 RX ORDER — FLUOXETINE 10 MG/1
10 CAPSULE ORAL DAILY
Qty: 90 CAPSULE | Refills: 3 | Status: SHIPPED | OUTPATIENT
Start: 2021-01-25 | End: 2022-02-21

## 2021-01-25 RX ORDER — POTASSIUM CHLORIDE 1500 MG/1
20 TABLET, EXTENDED RELEASE ORAL DAILY
Qty: 90 TABLET | Refills: 3 | Status: SHIPPED | OUTPATIENT
Start: 2021-01-25 | End: 2022-02-08

## 2021-01-25 RX ORDER — AMLODIPINE BESYLATE 10 MG/1
10 TABLET ORAL DAILY
Qty: 90 TABLET | Refills: 3 | Status: SHIPPED | OUTPATIENT
Start: 2021-01-25 | End: 2022-03-04

## 2021-01-25 RX ORDER — TRIAMTERENE AND HYDROCHLOROTHIAZIDE 37.5; 25 MG/1; MG/1
CAPSULE ORAL
Qty: 90 CAPSULE | Refills: 3 | Status: SHIPPED | OUTPATIENT
Start: 2021-01-25 | End: 2022-04-14

## 2021-01-25 RX ORDER — METOPROLOL SUCCINATE 50 MG/1
50 TABLET, EXTENDED RELEASE ORAL DAILY
Qty: 90 TABLET | Refills: 3 | Status: SHIPPED | OUTPATIENT
Start: 2021-01-25 | End: 2022-04-11

## 2021-01-25 RX ORDER — ROSUVASTATIN CALCIUM 5 MG/1
5 TABLET, COATED ORAL DAILY
Qty: 90 TABLET | Refills: 3 | Status: SHIPPED | OUTPATIENT
Start: 2021-01-25 | End: 2022-04-14

## 2021-01-25 SDOH — ECONOMIC STABILITY: INCOME INSECURITY: HOW HARD IS IT FOR YOU TO PAY FOR THE VERY BASICS LIKE FOOD, HOUSING, MEDICAL CARE, AND HEATING?: NOT ASKED

## 2021-01-25 SDOH — ECONOMIC STABILITY: FOOD INSECURITY: WITHIN THE PAST 12 MONTHS, THE FOOD YOU BOUGHT JUST DIDN'T LAST AND YOU DIDN'T HAVE MONEY TO GET MORE.: NOT ASKED

## 2021-01-25 SDOH — ECONOMIC STABILITY: TRANSPORTATION INSECURITY
IN THE PAST 12 MONTHS, HAS THE LACK OF TRANSPORTATION KEPT YOU FROM MEDICAL APPOINTMENTS OR FROM GETTING MEDICATIONS?: NOT ASKED

## 2021-01-25 SDOH — ECONOMIC STABILITY: TRANSPORTATION INSECURITY
IN THE PAST 12 MONTHS, HAS LACK OF TRANSPORTATION KEPT YOU FROM MEETINGS, WORK, OR FROM GETTING THINGS NEEDED FOR DAILY LIVING?: NOT ASKED

## 2021-01-25 SDOH — ECONOMIC STABILITY: FOOD INSECURITY: WITHIN THE PAST 12 MONTHS, YOU WORRIED THAT YOUR FOOD WOULD RUN OUT BEFORE YOU GOT MONEY TO BUY MORE.: NOT ASKED

## 2021-01-25 ASSESSMENT — ANXIETY QUESTIONNAIRES
6. BECOMING EASILY ANNOYED OR IRRITABLE: NOT AT ALL
1. FEELING NERVOUS, ANXIOUS, OR ON EDGE: SEVERAL DAYS
7. FEELING AFRAID AS IF SOMETHING AWFUL MIGHT HAPPEN: SEVERAL DAYS
IF YOU CHECKED OFF ANY PROBLEMS ON THIS QUESTIONNAIRE, HOW DIFFICULT HAVE THESE PROBLEMS MADE IT FOR YOU TO DO YOUR WORK, TAKE CARE OF THINGS AT HOME, OR GET ALONG WITH OTHER PEOPLE: NOT DIFFICULT AT ALL
5. BEING SO RESTLESS THAT IT IS HARD TO SIT STILL: NOT AT ALL
GAD7 TOTAL SCORE: 5
3. WORRYING TOO MUCH ABOUT DIFFERENT THINGS: SEVERAL DAYS
2. NOT BEING ABLE TO STOP OR CONTROL WORRYING: SEVERAL DAYS

## 2021-01-25 ASSESSMENT — PATIENT HEALTH QUESTIONNAIRE - PHQ9
SUM OF ALL RESPONSES TO PHQ QUESTIONS 1-9: 10
5. POOR APPETITE OR OVEREATING: SEVERAL DAYS

## 2021-01-25 ASSESSMENT — MIFFLIN-ST. JEOR: SCORE: 1459.15

## 2021-01-25 NOTE — PROGRESS NOTES
"SUBJECTIVE:   Miroslava Handy is a 71 year old female who presents for Preventive Visit.    Are you in the first 12 months of your Medicare Part B coverage?  No    HPI    Physical Health:    In general, how would you rate your overall physical health? good    Outside of work, how many days during the week do you exercise? Walks up and down stairs    Outside of work, approximately how many minutes a day do you exercise?    If you drink alcohol do you typically have >3 drinks per day or >7 drinks per week? No    Do you usually eat at least 4 servings of fruit and vegetables a day, include whole grains & fiber and avoid regularly eating high fat or \"junk\" foods? Yes    Do you have any problems taking medications regularly?  No    Do you have any side effects from medications? none    Needs assistance for the following daily activities: no assistance needed    Which of the following safety concerns are present in your home?  none identified     Hearing impairment: No    In the past 6 months, have you been bothered by leaking of urine? no    Mental Health:    In general, how would you rate your overall mental or emotional health? good    PHQ-2 Score:      Do you feel safe in your environment? Yes    Have you ever done Advance Care Planning? (For example, a Health Directive, POLST, or a discussion with a medical provider or your loved ones about your wishes): declined    Additional concerns to address?      Fall risk:     Cognitive Screenin) Repeat 3 items (Leader, Season, Table)    2) Clock draw: NORMAL  3) 3 item recall: Recalls 3 objects  Results: 3 items recalled: COGNITIVE IMPAIRMENT LESS LIKELY    Mini-CogTM Copyright REJI Dumas. Licensed by the author for use in [facility]; reprinted with permission (donald@.[facility]). All rights reserved.      Do you have sleep apnea, excessive snoring or daytime drowsiness?: no        Hyperlipidemia Follow-Up      Are you regularly taking any medication or " supplement to lower your cholesterol?   Yes- Crestor    Are you having muscle aches or other side effects that you think could be caused by your cholesterol lowering medication?  No    Hypertension Follow-up      Do you check your blood pressure regularly outside of the clinic? Yes     Are you following a low salt diet? Yes    Are your blood pressures ever more than 140 on the top number (systolic) OR more   than 90 on the bottom number (diastolic), for example 140/90? Yes    Depression and Anxiety Follow-Up    How are you doing with your depression since your last visit? No change    How are you doing with your anxiety since your last visit?  No change    Are you having other symptoms that might be associated with depression or anxiety? No    Have you had a significant life event? No     Do you have any concerns with your use of alcohol or other drugs? No    Social History     Tobacco Use     Smoking status: Former Smoker     Packs/day: 0.90     Years: 31.00     Pack years: 27.90     Types: Cigarettes     Quit date: 2018     Years since quittin.6     Smokeless tobacco: Never Used   Substance Use Topics     Alcohol use: Yes     Comment: 1 glass of wine per month     Drug use: No     PHQ 2020   PHQ-9 Total Score 15 5 10   Q9: Thoughts of better off dead/self-harm past 2 weeks Several days Not at all Not at all   F/U: Thoughts of suicide or self-harm No - -     OBINNA-7 SCORE 2020   Total Score 11 4 5     Last PHQ-9 2021   1.  Little interest or pleasure in doing things 1   2.  Feeling down, depressed, or hopeless 1   3.  Trouble falling or staying asleep, or sleeping too much 2   4.  Feeling tired or having little energy 1   5.  Poor appetite or overeating 3   6.  Feeling bad about yourself 1   7.  Trouble concentrating 0   8.  Moving slowly or restless 1   Q9: Thoughts of better off dead/self-harm past 2 weeks 0   PHQ-9 Total Score 10   Difficulty at work,  "home, or with people Not difficult at all   In the past two weeks have you had thoughts of suicide or self harm? -     OBINNA-7  2021   1. Feeling nervous, anxious, or on edge 1   2. Not being able to stop or control worrying 1   3. Worrying too much about different things 1   4. Trouble relaxing 1   5. Being so restless that it is hard to sit still 0   6. Becoming easily annoyed or irritable 0   7. Feeling afraid, as if something awful might happen 1   OBINNA-7 Total Score 5   If you checked any problems, how difficult have they made it for you to do your work, take care of things at home, or get along with other people? Not difficult at all       Suicide Assessment Five-step Evaluation and Treatment (SAFE-T)          Reviewed and updated as needed this visit by clinical staff  Tobacco  Allergies  Meds  Problems  Med Hx  Surg Hx  Fam Hx  Soc Hx          Reviewed and updated as needed this visit by Provider  Tobacco  Allergies  Meds  Problems  Med Hx  Surg Hx  Fam Hx           Social History     Tobacco Use     Smoking status: Former Smoker     Packs/day: 0.90     Years: 31.00     Pack years: 27.90     Types: Cigarettes     Quit date: 2018     Years since quittin.6     Smokeless tobacco: Never Used   Substance Use Topics     Alcohol use: Yes     Comment: 1 glass of wine per month                           Current providers sharing in care for this patient include:   Patient Care Team:  Rome Perdue MD as PCP - General  Rome Perdue MD as Assigned PCP    Mammogram Screening: Mammogram Screening: Recommended mammography every 1-2 years with patient discussion and risk factor consideration    ROS:  Constitutional, HEENT, cardiovascular, pulmonary, GI, , musculoskeletal, neuro, skin, endocrine and psych systems are negative, except as otherwise noted.    OBJECTIVE:   BP (!) 140/100   Pulse 80   Temp 96.6  F (35.9  C) (Tympanic)   Ht 1.715 m (5' 7.5\")   Wt 89.4 kg (197 lb)   SpO2 " "97%   BMI 30.40 kg/m   Estimated body mass index is 30.4 kg/m  as calculated from the following:    Height as of this encounter: 1.715 m (5' 7.5\").    Weight as of this encounter: 89.4 kg (197 lb).  EXAM:   GENERAL APPEARANCE: healthy, alert and no distress  EYES: Eyes grossly normal to inspection, PERRL and conjunctivae and sclerae normal  HENT: ear canals and TM's normal, nose and mouth without ulcers or lesions, oropharynx clear and oral mucous membranes moist  NECK: no adenopathy, no asymmetry, masses, or scars and thyroid normal to palpation  RESP: lungs clear to auscultation - no rales, rhonchi or wheezes  CV: regular rate and rhythm, normal S1 S2, no S3 or S4, no murmur, click or rub, no peripheral edema and peripheral pulses strong  ABDOMEN: soft, nontender, no hepatosplenomegaly, no masses and bowel sounds normal  MS: no musculoskeletal defects are noted and gait is age appropriate without ataxia  SKIN: no suspicious lesions or rashes  NEURO: Normal strength and tone, sensory exam grossly normal, mentation intact and speech normal  PSYCH: mentation appears normal and affect normal/bright  LYMPHATICS: ant. cervical- normal, post. cervical- normal, axillary- normal, supraclavicular- normal, inguinal- normal  BREAST: declined exam   (female): declined exam      ASSESSMENT / PLAN:   Encounter for Medicare annual wellness exam    Hypertension goal BP (blood pressure) < 130/80  Blood pressure elevated today although forgot to take medicines yet.  Will come back and after on meds and have a nurse blood pressure check  - BASIC METABOLIC PANEL  - Albumin Random Urine Quantitative with Creat Ratio  - amLODIPine (NORVASC) 10 MG tablet  Dispense: 90 tablet; Refill: 3  - metoprolol succinate ER (TOPROL-XL) 50 MG 24 hr tablet  Dispense: 90 tablet; Refill: 3  - potassium chloride ER (KLOR-CON M) 20 MEQ CR tablet  Dispense: 90 tablet; Refill: 3  - triamterene-HCTZ (DYAZIDE) 37.5-25 MG capsule  Dispense: 90 capsule; " "Refill: 3    Situational mixed anxiety and depressive disorder  Controlled - continue medication.  - FLUoxetine (PROZAC) 10 MG capsule  Dispense: 90 capsule; Refill: 3    Hyperlipidemia LDL goal <100  Controlled - continue medication.  - rosuvastatin (CRESTOR) 5 MG tablet  Dispense: 90 tablet; Refill: 3  - Lipid panel reflex to direct LDL Fasting    Stage 3 chronic kidney disease, unspecified whether stage 3a or 3b CKD  Stable - continue medication.  - BASIC METABOLIC PANEL  - Albumin Random Urine Quantitative with Creat Ratio  - CBC with platelets    Dermatitis  Intermittent need for:  - betamethasone valerate (VALISONE) 0.1 % external ointment  Dispense: 30 g; Refill: 2    Encounter for screening mammogram for malignant neoplasm of breast  - MA Screen Bilateral w/[facility]    Screen for colon cancer  - Fecal colorectal cancer screen (FIT)      End of Life Planning:  Patient currently has an advanced directive: No.  I have verified the patient's ablity to prepare an advanced directive/make health care decisions.  Literature was provided to assist patient in preparing an advanced directive.    COUNSELING:  Reviewed preventive health counseling, as reflected in patient instructions    Estimated body mass index is 30.4 kg/m  as calculated from the following:    Height as of this encounter: 1.715 m (5' 7.5\").    Weight as of this encounter: 89.4 kg (197 lb).  Weight management plan: Discussed healthy diet and exercise guidelines     reports that she quit smoking about 2 years ago. Her smoking use included cigarettes. She has a 27.90 pack-year smoking history. She has never used smokeless tobacco.      Appropriate preventive services were discussed with this patient, including applicable screening as appropriate for cardiovascular disease, diabetes, osteopenia/osteoporosis, and glaucoma.  As appropriate for age/gender, discussed screening for colorectal cancer, prostate cancer, breast cancer, and cervical cancer. Checklist " reviewing preventive services available has been given to the patient.    Reviewed patients plan of care and provided an AVS. The Basic Care Plan (routine screening as documented in Health Maintenance) for Miroslava meets the Care Plan requirement. This Care Plan has been established and reviewed with the Patient.    Return in about 2 weeks (around 2/8/2021) for  blood pressure recheck, in person, with nurse.         Marcial Perdue MD     [address] [address]  [email or web address]     Office: [phone]

## 2021-01-25 NOTE — PATIENT INSTRUCTIONS
Patient Education   Personalized Prevention Plan  You are due for the preventive services outlined below.  Your care team is available to assist you in scheduling these services.  If you have already completed any of these items, please share that information with your care team to update in your medical record.  Health Maintenance Due   Topic Date Due     Zoster (Shingles) Vaccine (1 of 2) 01/19/2002     Colorectal Cancer Screening  06/15/2019     Mammogram  06/21/2019     Flu Vaccine (1) 09/01/2020     PHQ-2  01/01/2021     Basic Metabolic Panel  01/09/2021     Kidney Microalbumin Urine Test  01/09/2021     FALL RISK ASSESSMENT  01/09/2021       Patient Education   Preventive Health Recommendations  See your health care provider every year to    Review health changes.     Discuss preventive care.      Review your medicines if your doctor has prescribed any.    Talk with your health care provider about whether you should have a test to screen for prostate cancer (PSA).    Every 3 years, have a diabetes test (fasting glucose). If you are at risk for diabetes, you should have this test more often.    Every 5 years, have a cholesterol test. Have this test more often if you are at risk for high cholesterol or heart disease.     Every 10 years, have a colonoscopy. Or, have a yearly FIT test (stool test). These exams will check for colon cancer.    Talk to with your health care provider about screening for Abdominal Aortic Aneurysm if you have a family history of AAA or have a history of smoking.    Shots:     Get a flu shot each year.     Get a tetanus shot every 10 years.     Talk to your doctor about your pneumonia vaccines. There are now two you should receive - Pneumovax (PPSV 23) and Prevnar (PCV 13).    Talk to your pharmacist about a shingles vaccine.     Talk to your doctor about the hepatitis B vaccine.    Nutrition:     Eat at least 5 servings of fruits and vegetables each day.     Eat whole-grain bread,  whole-wheat pasta and brown rice instead of white grains and rice.     Get adequate Calcium and Vitamin D.     Lifestyle    Exercise for at least 150 minutes a week (30 minutes a day, 5 days a week). This will help you control your weight and prevent disease.     Limit alcohol to one drink per day.     No smoking.     Wear sunscreen to prevent skin cancer.     See your dentist every six months for an exam and cleaning.     See your eye doctor every 1 to 2 years to screen for conditions such as glaucoma, macular degeneration and cataracts.    Personalized Prevention Plan  You are due for the preventive services outlined below.  Your care team is available to assist you in scheduling these services.  If you have already completed any of these items, please share that information with your care team to update in your medical record.  Health Maintenance   Topic Date Due     ZOSTER IMMUNIZATION (1 of 2) 01/19/2002     COLORECTAL CANCER SCREENING  06/15/2019     MAMMO SCREENING  06/21/2019     PHQ-2  01/01/2021     BMP  01/09/2021     LIPID  01/09/2021     MICROALBUMIN  01/09/2021     FALL RISK ASSESSMENT  01/09/2021     ANNUAL REVIEW OF HM ORDERS  01/13/2022     MEDICARE ANNUAL WELLNESS VISIT  01/25/2022     ADVANCE CARE PLANNING  01/13/2025     DTAP/TDAP/TD IMMUNIZATION (2 - Td) 06/14/2028     DEXA  09/14/2035     HEPATITIS C SCREENING  Completed     Pneumococcal Vaccine: 65+ Years  Completed     INFLUENZA VACCINE  Addressed     Pneumococcal Vaccine: Pediatrics (0 to 5 Years) and At-Risk Patients (6 to 64 Years)  Aged Out     IPV IMMUNIZATION  Aged Out     MENINGITIS IMMUNIZATION  Aged Out     HEPATITIS B IMMUNIZATION  Aged Out       Exercise for a Healthier Heart     Exercise with a friend. When activity is fun, you're more likely to stick with it.   You may wonder how you can improve the health of your heart. If you re thinking about exercise, you re on the right track. You don t need to become an athlete. But you do  need a certain amount of brisk exercise to help strengthen your heart. If you have been diagnosed with a heart condition, your healthcare provider may advise exercise to help stabilize your condition. To help make exercise a habit, choose safe, fun activities.   Before you start  Check with your healthcare provider before starting an exercise program. This is especially important if you have not been active for a while. It's also important if you have a long-term (chronic) health problem such as heart disease, diabetes, or obesity. Or if you are at high risk for having these problems.   Why exercise?  Exercising regularly offers many healthy rewards. It can help you do all of the following:    Improve your blood cholesterol level to help prevent further heart trouble    Lower your blood pressure to help prevent a stroke or heart attack    Control diabetes, or reduce your risk of getting this disease    Improve your heart and lung function    Reach and stay at a healthy weight    Make your muscles stronger so you can stay active    Prevent falls and fractures by slowing the loss of bone mass (osteoporosis)    Manage stress better    Reduce your blood pressure    Improve your sense of self and your body image  Exercise tips      Ease into your routine. Set small goals. Then build on them. If you are not sure what your activity level should be, talk with your healthcare provider first before starting an exercise routine.    Exercise on most days. Aim for a total of 150 minutes (2 hours and 30 minutes) or more of moderate-intensity aerobic activity each week. Or 75 minutes (1 hour and 15 minutes) or more of vigorous-intensity aerobic activity each week. Or try for a combination of both. Moderate activity means that you breathe heavier and your heart rate increases but you can still talk. Think about doing 40 minutes of moderate exercise, 3 to 4 times a week. For best results, activity should last for about 40 minutes to  lower blood pressure and cholesterol. It's OK to work up to the 40-minute period over time. Examples of moderate-intensity activity are walking 1 mile in 15 minutes. Or doing 30 to 45 minutes of yard work.    Step up your daily activity level.  Along with your exercise program, try being more active the whole day. Walk instead of drive. Or park further away so that you take more steps each day. Do more household tasks or yard work. You may not be able to meet the advised mount of physical activity. But doing some moderate- or vigorous-intensity aerobic activity can help reduce your risk for heart disease. Your healthcare provider can help you figure out what is best for you.    Choose 1 or more activities you enjoy.  Walking is one of the easiest things you can do. You can also try swimming, riding a bike, dancing, or taking an exercise class.    When to call your healthcare provider  Call your healthcare provider if you have any of these:     Chest pain or feel dizzy or lightheaded    Burning, tightness, pressure, or heaviness in your chest, neck, shoulders, back, or arms    Abnormal shortness of breath    More joint or muscle pain    A very fast or irregular heartbeat (palpitations)  [employer] last reviewed this educational content on 7/1/2019 2000-2020 [employer]. [address] [place] [address], [place] [address]. All rights reserved. This information is not intended as a substitute for professional medical care. Always follow your healthcare professional's instructions.          Understanding USDA MyPlate  The USDA (U.S. Department of Agriculture) has guidelines to help you make healthy food choices. These are called MyPlate. MyPlate shows the food groups that make up healthy meals using the image of a place setting. Before you eat, think about the healthiest choices for what to put onto your plate or into your cup or bowl. To learn more about building a healthy plate, visit  www.choosemyplate.gov.    The food groups    Fruits. Any fruit or 100% fruit juice counts as part of the Fruit Group. Fruits may be fresh, canned, frozen, or dried, and may be whole, cut-up, or pureed. Make half your plate fruits and vegetables.    Vegetables. Any vegetable or 100% vegetable juice counts as a member of the Vegetable Group. Vegetables may be fresh, frozen, canned, or dried. They can be served raw or cooked and may be whole, cut-up, or mashed. Make half your plate fruits and vegetables.    Grains. All foods made from grains are part of the Grains Group. These include wheat, rice, oats, cornmeal, and barley such as bread, pasta, oatmeal, cereal, tortillas, and grits. Grains should be no more than a quarter of your plate. At least half of your grains should be whole grains.    Protein. This group includes meat, poultry, seafood, beans and peas, eggs, processed soy products (like tofu), nuts (including nut butters), and seeds. Make protein choices no more than a quarter of your plate. Meat and poultry choices should be lean or low fat.    Dairy. All fluid milk products and foods made from milk that contain calcium, like yogurt and cheese, are part of the Dairy Group. (Foods that have little calcium, such as cream, butter, and cream cheese, are not part of the group.) Most dairy choices should be low-fat or fat-free.    Oils. These are fats that are liquid at room temperature. They include canola, corn, olive, soybean, and sunflower oil. Foods that are mainly oil include mayonnaise, certain salad dressings, and soft margarines. You should have only 5 to 7 teaspoons of oils a day. You probably already get this much from the food you eat.  [employer] last reviewed this educational content on 8/1/2017 2000-2020 [employer]. [address] [place] [address], [place] [address]. All rights reserved. This information is not intended as a substitute for professional medical care. Always follow your  "healthcare professional's instructions.           Patient Education     Tips for Sleep Hygiene  \"Sleep hygiene\" means having good sleep habits.Follow these tips to sleep better at night:     Get on a schedule. Go to bed and get up at about the same time every day.    Listen to your body. Only try to sleep when you actually feel tired or sleepy.    Be patient. If you haven't been able to get to sleep after about 30 minutes or more, get up and do something calming or boring until you feel sleepy. Then return to bed and try again.    Don't have caffeine (coffee, tea, cola drinks, chocolate and some medicines), alcohol or nicotine (cigarettes). These can make it harder for you to fall asleep and stay asleep.    Use your bed for sleeping only. That means no TV, computer or homework in bed, especially during the evening and before bedtime.    Don't nap during the day. If you must nap, make sure it is for less than 20 minutes.    Create sleep rituals that remind your body it is time to sleep. Examples include breathing exercises, stretching or reading a book.    Avoid all electronic media (smart phone, computer, tablet) within 2 hours of bed time. The \"blue light\" in these devices activates the part of the brain that keeps you awake.    Dim the lights at night.    Get early morning sources of light (walk in the sunshine) to help set sleep patterns at night.    Try a bath or shower before bed. Having a warm bath 1 to 2 hours before bedtime can help you feel sleepy. Hot baths can make you alert, so be mindful of the temperature.    Don't watch the clock. Checking the clock during the night can wake you up. It can also lead to negative thoughts such as, \"I will never fall asleep,\" which can increase anxiety and sleeplessness.    Use a sleep diary. Track your sleep schedule to know your sleep patterns and to see where you can improve.    Get regular exercise every day. Try not to do heavy exercise in the 4 hours before " bedtime.    Eat a healthy, balanced diet.    Try eating a light, healthy snack before bed, but avoid eating a heavy meal.    Create the right sleeping area. A cool, dark, quiet room is best. If needed, try earplugs, fans and blackout curtains.    Keep your daytime routine the same even if you have a bad night sleep. Avoiding activities the next day can make it harder to sleep.  For informational purposes only. Not to replace the advice of your health care provider.   Copyright   2013 [employer]. All rights reserved. [employer] 605823 - 01/16.  For informational purposes only. Not to replace the advice of your health care provider.  Copyright   2018 [employer]. All rights reserved.                Patient Education

## 2021-01-26 ASSESSMENT — ANXIETY QUESTIONNAIRES: GAD7 TOTAL SCORE: 5

## 2021-01-26 NOTE — RESULT ENCOUNTER NOTE
Dear Dawn,    Here is a summary of your recent test results:  -Normal red blood cell (hgb) levels, normal white blood cell count and normal platelet levels.  -Cholesterol levels are at your goal levels.  ADVISE: continuing your medication, a regular exercise program with at least 150 minutes of aerobic exercise per week, and eating a low saturated fat/low carbohydrate diet.  Also, you should recheck this fasting cholesterol panel in 12 months.  -Kidney function (GFR) is decreased, but stable.  ADVISE: Staying well-hydrated, and rechecking this in 3 months  -Sodium is normal.  -Potassium is normal.  -Calcium is normal.  -Glucose (diabetic screening test) is normal.  -Microalbumin (urine protein) level is elevated. This is suggestive of early damage to your kidneys from high blood pressure.  ADVISE: avoiding anti-inflamatory agents such as ibuprofen (Advil, Motrin) or naproxen (Aleve) as much as possible, keeping your blood pressure in a normal range, and continuing your medication (losartan) that helps protect your kidneys.  Also, this should be rechecked in 1 year.     For additional lab test information, labtestsonline.org is an excellent reference.    In addition, here is a list of due or overdue Health Maintenance reminders:    Colorectal Cancer Screening due on 06/15/2019  Mammogram due on 06/21/2019      Please call us at [phone] (or use [employer]) to address the above recommendations if needed.           Thank you very much for trusting me and [facility].     Have a peaceful day.    Healthy regards,  Marcial Perdue MD

## 2021-02-20 ENCOUNTER — HEALTH MAINTENANCE LETTER (OUTPATIENT)
Age: 71
End: 2021-02-20

## 2021-06-14 NOTE — PROGRESS NOTES
[address] [address]  Phone: [phone]  Primary Provider: Quincy Mata  Pre-op Performing Provider: QUINCY MATA    PREOPERATIVE EVALUATION:  Today's date: 6/15/2021    Miroslava Handy is a 71 year old female who presents for a preoperative evaluation.    Surgical Information:  Surgery/Procedure: Cataract Surgery  Surgery Location: [facility]  Surgeon: Flavia Ashby  Surgery Date: 6/21/2021  Time of Surgery: 1030  Where patient plans to recover: At home with family  Fax number for surgical facility: [phone]    Type of Anesthesia Anticipated: Local with MAC    Assessment & Plan     The proposed surgical procedure is considered LOW risk.    Preop general physical exam    Cataract, unspecified cataract type, unspecified laterality      Hypertension goal BP (blood pressure) < 130/80  Controlled - continue medication.     Personal history of tobacco use  Due for screening  - Prof Fee: Shared Decision Making Visit for Lung Cancer Screening  - CT Chest Lung Cancer Scrn Low Dose wo    Medication Instructions:  Patient is to take all scheduled medications on the day of surgery    RECOMMENDATION:  APPROVAL GIVEN to proceed with proposed procedure, without further diagnostic evaluation.      Marcial Mata MD     [address] [address]  Office: [phone]  [email or web address]           Subjective     HPI related to upcoming procedure: cataracts      Preop Questions 6/15/2021   1. Have you ever had a heart attack or stroke? No   2. Have you ever had surgery on your heart or blood vessels, such as a stent placement, a coronary artery bypass, or surgery on an artery in your head, neck, heart, or legs? No   3. Do you have chest pain with activity? No   4. Do you have a history of  heart failure? No   5. Do you currently have a cold, bronchitis or symptoms  of other infection? No   6. Do you have a cough, shortness of breath, or wheezing? No   7. Do you or anyone in your family have previous history of blood clots? No   8. Do you or does anyone in your family have a serious bleeding problem such as prolonged bleeding following surgeries or cuts? No   9. Have you ever had problems with anemia or been told to take iron pills? YES - distant history normal hemoglobin recently   10. Have you had any abnormal blood loss such as black, tarry or bloody stools, or abnormal vaginal bleeding? No   11. Have you ever had a blood transfusion? No   12. Are you willing to have a blood transfusion if it is medically needed before, during, or after your surgery? Yes   13. Have you or any of your relatives ever had problems with anesthesia? UNKNOWN    14. Do you have sleep apnea, excessive snoring or daytime drowsiness? No   15. Do you have any artifical heart valves or other implanted medical devices like a pacemaker, defibrillator, or continuous glucose monitor? No   16. Do you have artificial joints? No   17. Are you allergic to latex? No     Health Care Directive:  Patient does not have a Health Care Directive or Living Will: Discussed advance care planning with patient; information given to patient to review.    Preoperative Review of :   reviewed - no record of controlled substances prescribed.    Review of Systems  CONSTITUTIONAL: NEGATIVE for fever, chills, change in weight  ENT/MOUTH: NEGATIVE for ear, mouth and throat problems  RESP: NEGATIVE for significant cough or SOB  CV: NEGATIVE for chest pain, palpitations or peripheral edema    Patient Active Problem List    Diagnosis Date Noted     CKD (chronic kidney disease) stage 3, GFR 30-59 ml/min 01/09/2020     Priority: High     Hyperlipidemia LDL goal <100 10/31/2010     Priority: High     The 10-year ASCVD risk score (Goodmanamilcar RAMSAY Jr., et al., 2013) is: 10%    Values used to calculate the score:      Age: 70 years      Sex:  Female      Is Non- : No      Diabetic: No      Tobacco smoker: No      Systolic Blood Pressure: 110 mmHg      Is BP treated: Yes      HDL Cholesterol: 34 mg/dL      Total Cholesterol: 289 mg/dL         Hypertension goal BP (blood pressure) < 130/80 08/25/2008     Priority: High      Past Medical History:   Diagnosis Date     Hypertension      Tobacco use disorder - quit 6/12/18 8/25/2008     Past Surgical History:   Procedure Laterality Date     C EXPLORATORY OF ABDOMEN  1977    infertility related     HC DILATION/CURETTAGE DIAG/THER NON OB      x2     Current Outpatient Medications   Medication Sig Dispense Refill     amLODIPine (NORVASC) 10 MG tablet Take 1 tablet (10 mg) by mouth daily 90 tablet 3     ASPIRIN 81 MG OR TABS ONE DAILY 100 3     betamethasone valerate (VALISONE) 0.1 % external ointment Apply topically 2 times daily 30 g 2     FLUoxetine (PROZAC) 10 MG capsule Take 1 capsule (10 mg) by mouth daily 90 capsule 3     losartan (COZAAR) 50 MG tablet TAKE 1 TABLET BY MOUTH EVERY DAY 90 tablet 3     metoprolol succinate ER (TOPROL-XL) 50 MG 24 hr tablet Take 1 tablet (50 mg) by mouth daily 90 tablet 3     Omega-3 Fatty Acids (FISH OIL PO) Take 1,000 mg by mouth       potassium chloride ER (KLOR-CON M) 20 MEQ CR tablet Take 1 tablet (20 mEq) by mouth daily 90 tablet 3     rosuvastatin (CRESTOR) 5 MG tablet Take 1 tablet (5 mg) by mouth daily 90 tablet 3     triamterene-HCTZ (DYAZIDE) 37.5-25 MG capsule TAKE 1 CAPSULE BY MOUTH EVERY DAY 90 capsule 3     vitamin D3 (CHOLECALCIFEROL) 2000 units (50 mcg) tablet Take 2 tablets (4,000 Units) by mouth daily         Allergies   Allergen Reactions     Atorvastatin      Neck and calf spasms.      Codeine Nausea and Vomiting and Other (See Comments)     Lisinopril Nausea        Social History     Tobacco Use     Smoking status: Former Smoker     Packs/day: 0.90     Years: 31.00     Pack years: 27.90     Types: Cigarettes     Quit date:  "6/14/2018     Years since quitting: 3.0     Smokeless tobacco: Never Used   Substance Use Topics     Alcohol use: Yes     Comment: 1 glass of wine per month     History   Drug Use No         Objective     /82 (BP Location: Right arm, Cuff Size: Adult Regular)   Pulse 72   Temp 97.2  F (36.2  C) (Tympanic)   Ht 1.715 m (5' 7.5\")   Wt 87.5 kg (193 lb)   SpO2 98%   BMI 29.78 kg/m      Physical Exam    GENERAL APPEARANCE: healthy, alert and no distress     EYES: EOMI, PERRL     HENT: ear canals and TM's normal and nose and mouth without ulcers or lesions     NECK: no adenopathy, no asymmetry, masses, or scars and thyroid normal to palpation     RESP: lungs clear to auscultation - no rales, rhonchi or wheezes     CV: regular rates and rhythm, normal S1 S2, no S3 or S4 and no murmur, click or rub     ABDOMEN:  soft, nontender, no HSM or masses and bowel sounds normal     MS: extremities normal- no gross deformities noted, no evidence of inflammation in joints, FROM in all extremities.     SKIN: no suspicious lesions or rashes     NEURO: Normal strength and tone, sensory exam grossly normal, mentation intact and speech normal     PSYCH: mentation appears normal. and affect normal/bright     LYMPHATICS: No cervical adenopathy    Recent Labs   Lab Test 01/25/21  1158 01/09/20  1249   HGB 12.6 13.2    309    137   POTASSIUM 4.6 4.3   CR 1.57* 1.44*        Diagnostics:  No labs were ordered during this visit.   No EKG required for low risk surgery (cataract, skin procedure, breast biopsy, etc).    Revised Cardiac Risk Index (RCRI):  The patient has the following serious cardiovascular risks for perioperative complications:   - No serious cardiac risks = 0 points     RCRI Interpretation: 0 points: Class I (very low risk - 0.4% complication rate)       Signed Electronically by: Rome Perdue MD  Copy of this evaluation report is provided to requesting physician.    "

## 2021-06-15 ENCOUNTER — OFFICE VISIT (OUTPATIENT)
Dept: FAMILY MEDICINE | Facility: CLINIC | Age: 71
End: 2021-06-15
Payer: MEDICARE

## 2021-06-15 VITALS
TEMPERATURE: 97.2 F | DIASTOLIC BLOOD PRESSURE: 82 MMHG | WEIGHT: 193 LBS | OXYGEN SATURATION: 98 % | SYSTOLIC BLOOD PRESSURE: 138 MMHG | HEART RATE: 72 BPM | HEIGHT: 68 IN | BODY MASS INDEX: 29.25 KG/M2

## 2021-06-15 DIAGNOSIS — H26.9 CATARACT, UNSPECIFIED CATARACT TYPE, UNSPECIFIED LATERALITY: ICD-10-CM

## 2021-06-15 DIAGNOSIS — Z87.891 PERSONAL HISTORY OF TOBACCO USE: ICD-10-CM

## 2021-06-15 DIAGNOSIS — I10 HYPERTENSION GOAL BP (BLOOD PRESSURE) < 130/80: ICD-10-CM

## 2021-06-15 DIAGNOSIS — Z01.818 PREOP GENERAL PHYSICAL EXAM: Primary | ICD-10-CM

## 2021-06-15 PROCEDURE — G0296 VISIT TO DETERM LDCT ELIG: HCPCS | Performed by: FAMILY MEDICINE

## 2021-06-15 PROCEDURE — 99214 OFFICE O/P EST MOD 30 MIN: CPT | Performed by: FAMILY MEDICINE

## 2021-06-15 ASSESSMENT — MIFFLIN-ST. JEOR: SCORE: 1441

## 2021-06-15 ASSESSMENT — PATIENT HEALTH QUESTIONNAIRE - PHQ9: SUM OF ALL RESPONSES TO PHQ QUESTIONS 1-9: 5

## 2021-06-15 NOTE — PROGRESS NOTES
Lung Cancer Screening Shared Decision Making Visit     Miroslava Handy is eligible for lung cancer screening on the basis of the information provided in my signed lung cancer screening order.     I have discussed with patient the risks and benefits of screening for lung cancer with low-dose CT.     The risks include:  radiation exposure: one low dose chest CT has as much ionizing radiation as about 15 chest x-rays or 6 months of background radiation living in [place]    false positives: 96% of positive findings/nodules are NOT cancer, but some might still require additional diagnostic evaluation, including biopsy  over-diagnosis: some slow growing cancers that might never have been clinically significant will be detected and treated unnecessarily     The benefit of early detection of lung cancer is contingent upon adherence to annual screening or more frequent follow up if indicated.     Furthermore, reaping the benefits of screening requires Miroslava Handy to be willing and physically able to undergo diagnostic procedures, if indicated. Although no specific guide is available for determining severity of comorbidities, it is reasonable to withhold screening in patients who have greater mortality risk from other diseases.     We did discuss that the only way to prevent lung cancer is to not smoke. Smoking cessation counseling was not given.      I did not offer risk estimation using a calculator such as this one:    ShouldIScreen

## 2021-06-15 NOTE — PATIENT INSTRUCTIONS
Preparing for Your Surgery  Getting started  A nurse will call you to review your health history and instructions. They will give you an arrival time based on your scheduled surgery time.  Please be ready to share the following:    Your doctor's clinic name and phone number    Your medical, surgical and anesthesia history    A list of allergies and sensitivities    A list of medicines, including herbal treatments and over-the-counter drugs    Whether the patient has a legal guardian (ask how to send us the papers in advance)  If you have a child who's having surgery, please ask for a copy of Preparing for Your Child's Surgery.    Preparing for surgery    Within 30 days of surgery: Have a pre-op exam (sometimes called an H&P, or History and Physical). This can be done at a clinic or pre-operative center.  ? If you're having a , you may not need this exam. Talk to your care team    At your pre-op exam, talk to your care team about all medicines you take. If you need to stop any medicines before surgery, ask when to start taking them again.  ? We do this for your safety. Many medicines can make you bleed too much during surgery. Some change how well surgery (anesthesia) drugs work.    Call your insurance company to let them know you're having surgery. (If you don't have insurance, call [phone].)    Call your clinic if there's any change in your health. This includes signs of a cold or flu (sore throat, runny nose, cough, rash, fever). It also includes a scrape or scratch near the surgery site.    If you have questions on the day of surgery, call your hospital or surgery center.  Eating and drinking guidelines  For your safety: Unless your surgeon tells you otherwise, follow the guidelines below.    Eat and drink as usual until 8 hours before surgery. After that, no food or milk.    Drink clear liquids until 2 hours before surgery. These are liquids you can see through, like water, Gatorade and Propel  Water. You may also have black coffee and tea (no cream or milk).    Nothing by mouth within 2 hours of surgery. This includes gum, candy and breath mints.    If you drink, stop drinking alcohol the night before surgery.    If your care team tells you to take medicine on the morning of surgery, it's okay to take it with a sip of water.  Preventing infection    Shower or bathe the night before and morning of your surgery. Follow the instructions your clinic gave you. (If no instructions, use regular soap.)    Don't shave or clip hair near your surgery site. We'll remove the hair if needed.    Don't smoke or vape the morning of surgery. You may chew nicotine gum up to 2 hours before surgery. A nicotine patch is okay.  ? Note: Some surgeries require you to completely quit smoking and nicotine. Check with your surgeon.    Your care team will make every effort to keep you safe from infection. We will:  ? Clean our hands often with soap and water (or an alcohol-based hand rub).  ? Clean the skin at your surgery site with a special soap that kills germs.  ? Give you a special gown to keep you warm. (Cold raises the risk of infection.)  ? Wear special hair covers, masks, gowns and gloves during surgery.  ? Give antibiotic medicine, if prescribed. Not all surgeries need antibiotics.  What to bring on the day of surgery    Photo ID and insurance card    Copy of your health care directive, if you have one    Glasses and hearing aides (bring cases)  ? You can't wear contacts during surgery    Inhaler and eye drops, if you use them (tell us about these when you arrive)    CPAP machine or breathing device, if you use them    A few personal items, if spending the night    If you have . . .  ? A pacemaker or ICD (cardiac defibrillator): Bring the ID card.  ? An implanted stimulator: Bring the remote control.  ? A legal guardian: Bring a copy of the certified (court-stamped) guardianship papers.  Please remove any jewelry, including  body piercings. Leave jewelry and other valuables at home.  If you're going home the day of surgery  Important: If you don't follow the rules below, we must cancel your surgery.     Arrange for someone to drive you home after surgery. You may not drive, take a taxi or take public transportation by yourself (unless you'll have local anesthesia only).    Arrange for a responsible adult to stay with you overnight. If you don't, we may keep you in the hospital overnight, and you may need to pay the costs yourself.  Questions?   If you have any questions for your care team, list them here: _________________________________________________________________________________________________________________________________________________________________________________________________________________________________________________________________________________________________________________________  For informational purposes only. Not to replace the advice of your health care provider. Copyright   2003, 2019 [employer]. All rights reserved. Clinically reviewed by Kesha Lindsey MD. [employer] 713832 - REV 4/20.    Lung Cancer Screening   Frequently Asked Questions  If you are at high-risk for lung cancer, getting screened with low-dose computed tomography (LDCT) every year can help save your life. This handout offers answers to some of the most common questions about lung cancer screening. If you have other questions, please call [phone] ([phone]).     What is it?  Lung cancer screening uses special X-ray technology to create an image of your lung tissue. The exam is quick and easy and takes less than 10 seconds. We don t give you any medicine or use any needles. You can eat before and after the exam. You don t need to change your clothes as long as the clothing on your chest doesn t contain metal. But, you do need to be able to hold your breath for at least 6 seconds during the exam.    What  is the goal of lung cancer screening?  The goal of lung cancer screening is to save lives. Many times, lung cancer is not found until a person starts having physical symptoms. Lung cancer screening can help detect lung cancer in the earliest stages when it may be easier to treat.    Who should be screened for lung cancer?  We suggest lung cancer screening for anyone who is at high-risk for lung cancer. You are in the high-risk group if you:      are between the ages of 55 and 79, and    have smoked at least 1 pack of cigarettes a day for 30 or more years, and    still smoke or have quit within the past 15 years.    However, if you have a new cough or shortness of breath, you should talk to your doctor before being screened.    Some national lung health advocacy groups also recommend screening for people ages 50 to 79 who have smoked an average of 1 pack of cigarettes a day for 20 years. They must also have at least 1 other risk factor for lung cancer, not including exposure to secondhand smoke. Other risk factors are having had cancer in the past, emphysema, pulmonary fibrosis, COPD, a family history of lung cancer, or exposure to certain materials such as arsenic, asbestos, beryllium, cadmium, chromium, diesel fumes, nickel, radon or silica. Your care team can help you know if you have one of these risk factors.     Why does it matter if I have symptoms?  Certain symptoms can be a sign that you have a condition in your lungs that should be checked and treated by your doctor. These symptoms include fever, chest pain, a new or changing cough, shortness of breath that you have never felt before, coughing up blood or unexplained weight loss. Having any of these symptoms can greatly affect the results of lung cancer screening.       Should all smokers get an LDCT lung cancer screening exam?  It depends. Lung cancer screening is for a very specific group of men and women who have a history of heavy smoking over a long  period of time (see  Who should be screened for lung cancer  above).  I am in the high-risk group, but have been diagnosed with cancer in the past. Is LDCT lung cancer screening right for me?  In some cases, you should not have LDCT lung screening, such as when your doctor is already following your cancer with CT scan studies. Your doctor will help you decide if LDCT lung screening is right for you.  Do I need to have a screening exam every year?  Yes. If you are in the high-risk group described earlier, you should get an LDCT lung cancer screening exam every year until you are 79, or are no longer willing or able to undergo screening and possible procedures to diagnose and treat lung cancer.  How effective is LDCT at preventing death from lung cancer?  Studies have shown that LDCT lung cancer screening can lower the risk of death from lung cancer by 20 percent in people who are at high-risk.  What are the risks?  There are some risks and limitations of LDCT lung cancer screening. We want to make sure you understand the risks and benefits, so please let us know if you have any questions. Your doctor may want to talk with you more about these risks.    Radiation exposure: As with any exam that uses radiation, there is a very small increased risk of cancer. The amount of radiation in LDCT is small--about the same amount a person would get from a mammogram. Your doctor orders the exam when he or she feels the potential benefits outweigh the risks.    False negatives: No test is perfect, including LDCT. It is possible that you may have a medical condition, including lung cancer, that is not found during your exam. This is called a false negative result.    False positives and more testing: LDCT very often finds something in the lung that could be cancer, but in fact is not. This is called a false positive result. False positive tests often cause anxiety. To make sure these findings are not cancer, you may need to have  more tests. These tests will be done only if you give us permission. Sometimes patients need a treatment that can have side effects, such as a biopsy. For more information on false positives, see  What can I expect from the results?     Findings not related to lung cancer: Your LDCT exam also takes pictures of areas of your body next to your lungs. In a very small number of cases, the CT scan will show an abnormal finding in one of these areas, such as your kidneys, adrenal glands, liver or thyroid. This finding may not be serious, but you may need more tests. Your doctor can help you decide what other tests you may need, if any.  What can I expect from the results?  About 1 out of 4 LDCT exams will find something that may need more tests. Most of the time, these findings are lung nodules. Lung nodules are very small collections of tissue in the lung. These nodules are very common, and the vast majority--more than 97 percent--are not cancer (benign). Most are normal lymph nodes or small areas of scarring from past infections.  But, if a small lung nodule is found to be cancer, the cancer can be cured more than 90 percent of the time. To know if the nodule is cancer, we may need to get more images before your next yearly screening exam. If the nodule has suspicious features (for example, it is large, has an odd shape or grows over time), we will refer you to a specialist for further testing.  Will my doctor also get the results?  Yes. Your doctor will get a copy of your results.  Is it okay to keep smoking now that there s a cancer screening exam?  No. Tobacco is one of the strongest cancer-causing agents. It causes not only lung cancer, but other cancers and cardiovascular (heart) diseases as well. The damage caused by smoking builds over time. This means that the longer you smoke, the higher your risk of disease. While it is never too late to quit, the sooner you quit, the better.  Where can I find help to quit  smoking?  The best way to prevent lung cancer is to stop smoking. If you have already quit smoking, congratulations and keep it up! For help on quitting smoking, please call [employer] at 8-212-848-EFXP ([phone]) or the American [employer] Society at 1-351.887.1551 to find local resources near you.  One-on-one health coaching:  If you d prefer to work individually with a health care provider on tobacco cessation, we offer:      Medication Therapy Management:  Our specially trained pharmacists work closely with you and your doctor to help you quit smoking.  Call [phone] or [phone] (toll free).     Can Do: Health coaching offered by [facility].  [email or web address]

## 2021-09-26 ENCOUNTER — HEALTH MAINTENANCE LETTER (OUTPATIENT)
Age: 71
End: 2021-09-26

## 2022-01-06 PROCEDURE — 99285 EMERGENCY DEPT VISIT HI MDM: CPT | Mod: 25

## 2022-01-06 PROCEDURE — C9803 HOPD COVID-19 SPEC COLLECT: HCPCS

## 2022-01-06 PROCEDURE — 93005 ELECTROCARDIOGRAM TRACING: CPT

## 2022-01-07 ENCOUNTER — APPOINTMENT (OUTPATIENT)
Dept: GENERAL RADIOLOGY | Facility: CLINIC | Age: 72
End: 2022-01-07
Attending: EMERGENCY MEDICINE
Payer: MEDICARE

## 2022-01-07 ENCOUNTER — HOSPITAL ENCOUNTER (EMERGENCY)
Facility: CLINIC | Age: 72
End: 2022-01-07
Payer: MEDICARE

## 2022-01-07 ENCOUNTER — HOSPITAL ENCOUNTER (EMERGENCY)
Facility: CLINIC | Age: 72
Discharge: HOME | End: 2022-01-07
Attending: EMERGENCY MEDICINE | Admitting: EMERGENCY MEDICINE
Payer: MEDICARE

## 2022-01-07 VITALS
SYSTOLIC BLOOD PRESSURE: 163 MMHG | TEMPERATURE: 98.3 F | DIASTOLIC BLOOD PRESSURE: 74 MMHG | OXYGEN SATURATION: 100 % | RESPIRATION RATE: 20 BRPM | HEART RATE: 70 BPM

## 2022-01-07 DIAGNOSIS — R03.0 ELEVATED BLOOD PRESSURE READING: ICD-10-CM

## 2022-01-07 DIAGNOSIS — J20.9 ACUTE BRONCHITIS, UNSPECIFIED ORGANISM: ICD-10-CM

## 2022-01-07 LAB
ATRIAL RATE - MUSE: 87 BPM
DIASTOLIC BLOOD PRESSURE - MUSE: NORMAL MMHG
FLUAV RNA SPEC QL NAA+PROBE: NEGATIVE
FLUBV RNA RESP QL NAA+PROBE: NEGATIVE
INTERPRETATION ECG - MUSE: NORMAL
P AXIS - MUSE: 67 DEGREES
PR INTERVAL - MUSE: 158 MS
QRS DURATION - MUSE: 84 MS
QT - MUSE: 370 MS
QTC - MUSE: 445 MS
R AXIS - MUSE: 25 DEGREES
SARS-COV-2 RNA RESP QL NAA+PROBE: NEGATIVE
SYSTOLIC BLOOD PRESSURE - MUSE: NORMAL MMHG
T AXIS - MUSE: 43 DEGREES
VENTRICULAR RATE- MUSE: 87 BPM

## 2022-01-07 PROCEDURE — 250N000009 HC RX 250: Performed by: EMERGENCY MEDICINE

## 2022-01-07 PROCEDURE — 71046 X-RAY EXAM CHEST 2 VIEWS: CPT

## 2022-01-07 PROCEDURE — 87636 SARSCOV2 & INF A&B AMP PRB: CPT | Performed by: EMERGENCY MEDICINE

## 2022-01-07 PROCEDURE — 94640 AIRWAY INHALATION TREATMENT: CPT

## 2022-01-07 PROCEDURE — 250N000013 HC RX MED GY IP 250 OP 250 PS 637: Performed by: EMERGENCY MEDICINE

## 2022-01-07 RX ORDER — OXYMETAZOLINE HYDROCHLORIDE 0.05 G/100ML
2 SPRAY NASAL ONCE
Status: COMPLETED | OUTPATIENT
Start: 2022-01-07 | End: 2022-01-07

## 2022-01-07 RX ORDER — ALBUTEROL SULFATE 90 UG/1
2-4 AEROSOL, METERED RESPIRATORY (INHALATION)
Qty: 6.7 G | Refills: 0 | Status: SHIPPED | OUTPATIENT
Start: 2022-01-07 | End: 2022-04-14

## 2022-01-07 RX ORDER — BENZONATATE 200 MG/1
200 CAPSULE ORAL 3 TIMES DAILY PRN
Qty: 21 CAPSULE | Refills: 0 | Status: SHIPPED | OUTPATIENT
Start: 2022-01-07 | End: 2022-01-14

## 2022-01-07 RX ORDER — ALBUTEROL SULFATE 90 UG/1
6 AEROSOL, METERED RESPIRATORY (INHALATION) ONCE
Status: COMPLETED | OUTPATIENT
Start: 2022-01-07 | End: 2022-01-07

## 2022-01-07 RX ORDER — OXYMETAZOLINE HCL 0.05 %
2 SPRAY, NON-AEROSOL (ML) NASAL 2 TIMES DAILY
Refills: 0 | COMMUNITY
Start: 2022-01-07 | End: 2022-01-10

## 2022-01-07 RX ORDER — ACETAMINOPHEN 500 MG
1000 TABLET ORAL ONCE
Status: COMPLETED | OUTPATIENT
Start: 2022-01-07 | End: 2022-01-07

## 2022-01-07 RX ADMIN — GUAIFENESIN 10 ML: 200 SOLUTION ORAL at 00:54

## 2022-01-07 RX ADMIN — ACETAMINOPHEN 1000 MG: 500 TABLET, FILM COATED ORAL at 00:54

## 2022-01-07 RX ADMIN — Medication 2 SPRAY: at 02:13

## 2022-01-07 RX ADMIN — ALBUTEROL SULFATE 6 PUFF: 90 AEROSOL, METERED RESPIRATORY (INHALATION) at 02:14

## 2022-01-07 ASSESSMENT — ENCOUNTER SYMPTOMS
SHORTNESS OF BREATH: 1
FEVER: 0
COUGH: 1
DIARRHEA: 0
VOMITING: 0
NAUSEA: 0

## 2022-01-07 NOTE — ED PROVIDER NOTES
History   Chief Complaint:  Cough     HPI   Miroslava Handy is a 71 year old female with history of hypertension who presents for evaluation of a cough. Approximately two days ago the patient started to develop a dry cough, congestion, and shortness of breath. Since then she has developed chest pain that is present only when coughing. Due to her ongoing symptoms today she came into the ED for evaluation. She has not had any measured fever, nausea, vomiting, or diarrhea. She is vaccinated for COVID-19 but not influenza.     Review of Systems   Constitutional: Negative for fever.   HENT: Positive for congestion.    Respiratory: Positive for cough and shortness of breath.    Cardiovascular: Positive for chest pain.   Gastrointestinal: Negative for diarrhea, nausea and vomiting.   All other systems reviewed and are negative.      Allergies:  Atorvastatin  Codeine  Lisinopril    Medications:  Amlodipine  Aspirin   Prozac  Losartan  Toprol-XL  Potassium chloride ER    Rosuvastatin  Dyazide     Past Medical History:     Hypertension  Hyperlipidemia  Chronic kidney disease       Past Surgical History:    Dilation and curettage   Exploratory of abdomen      Family History:    Hypertension - Father and mother   Cerebrovascular disease - Father   CAD - Father   Diabetes - Mother  Leukemia - Mother    Social History:  The patient presented accompanied by a family member.   The patient is vaccinated for COVID-19 but not influenza.     Physical Exam     Patient Vitals for the past 24 hrs:   BP Temp Temp src Pulse Resp SpO2   01/07/22 0300 (!) 163/74 -- -- 70 20 100 %   01/07/22 0045 (!) 187/117 -- -- 85 20 100 %   01/06/22 2309 (!) 208/103 98.3  F (36.8  C) Oral 97 20 98 %       Physical Exam    HEENT:         Normal conjunctiva, No  discharge           R TM  external ear and EAC normal         L  TM  external ear and EAC normal           Posterior oropharynx:               Yes erythema,               No exudates,                Tonsillar hypertrophy - no              uvula midline - Yes    Neck: no adenopathy      Lungs:     Mild tachypnea, clear to auscultation    Heart: Regular, no m/r/g, ppi    Neuro: alert, no focal gross focal deficits    Psych: Normal mood and affect      Emergency Department Course   ECG  ECG obtained at 23:17:13, ECG read at 2330  Sinus rhythm, Possible left atrial enlargement, Borderline ECG  Rate 87 bpm. WI interval 158 ms. QRS duration 84 ms. QT/QTc 370/445 ms. P-R-T axes 67 25 43.     Imaging:  XR Chest:  IMPRESSION: No acute abnormality.  Per radiology.      Laboratory:  Symptomatic Influenza A/B & SARS-CoV2 (COVID19) Virus PCR Multiplex: Negative       Emergency Department Course:  Reviewed:  I reviewed nursing notes, vitals and past medical history    Assessments:  0105: I obtained history and examined the patient as noted above.     0420: I updated and reassessed the patient.      Interventions:  0054 Tylenol 1,000 mg PO  0054 Robitussin 10 mL PO   0213 Afrin 2 sprays nasal  0214 Albuterol 6 puffs inhalation     Disposition:  The patient was discharged to home.     Impression & Plan       Medical Decision Makin y F with resp tract inf sx. Suspect viral bronchitis. Covid/flu negative, cxr w/o e/o lobar pna.  Stable for dc home and supportive care.      Covid-19  Miroslava Handy was evaluated during a global COVID-19 pandemic, which necessitated consideration that the patient might be at risk for infection with the SARS-CoV-2 virus that causes COVID-19.   Applicable protocols for evaluation were followed during the patient's care.   COVID-19 was considered as part of the patient's evaluation. The plan for testing is:  a test was obtained during this visit.       Diagnosis:    ICD-10-CM    1. Acute bronchitis, unspecified organism  J20.9    2. Elevated blood pressure reading  R03.0        Discharge Medications:  New Prescriptions    ALBUTEROL (PROAIR HFA/PROVENTIL HFA/VENTOLIN HFA) 108 (90 BASE)  MCG/ACT INHALER    Inhale 2-4 puffs into the lungs every 2 hours as needed for shortness of breath / dyspnea    BENZONATATE (TESSALON) 200 MG CAPSULE    Take 1 capsule (200 mg) by mouth 3 times daily as needed for cough    OXYMETAZOLINE (AFRIN NASAL SPRAY) 0.05 % NASAL SPRAY    Spray 2 sprays in nostril 2 times daily for 3 days       Scribe Disclosure:  I, Johann Virgen, am serving as a scribe at 1:05 AM on 1/7/2022 to document services personally performed by Asim Espinosa MD based on my observations and the provider's statements to me.           Asim Espinosa MD  01/07/22 3390

## 2022-01-07 NOTE — ED TRIAGE NOTES
Pt states cough, congestion and dyspnea for over 2 days. Pt states has not been vaccinated for flu but has been vaccinated for COVID. ABCs intact GCS 15

## 2022-02-21 ENCOUNTER — OFFICE VISIT (OUTPATIENT)
Dept: FAMILY MEDICINE | Facility: CLINIC | Age: 72
End: 2022-02-21
Payer: MEDICARE

## 2022-02-21 VITALS
DIASTOLIC BLOOD PRESSURE: 62 MMHG | SYSTOLIC BLOOD PRESSURE: 118 MMHG | TEMPERATURE: 97.1 F | HEIGHT: 68 IN | BODY MASS INDEX: 30.62 KG/M2 | HEART RATE: 76 BPM | OXYGEN SATURATION: 97 % | WEIGHT: 202 LBS

## 2022-02-21 DIAGNOSIS — N18.30 STAGE 3 CHRONIC KIDNEY DISEASE, UNSPECIFIED WHETHER STAGE 3A OR 3B CKD (H): ICD-10-CM

## 2022-02-21 DIAGNOSIS — F43.23 SITUATIONAL MIXED ANXIETY AND DEPRESSIVE DISORDER: ICD-10-CM

## 2022-02-21 DIAGNOSIS — Z12.11 SCREEN FOR COLON CANCER: ICD-10-CM

## 2022-02-21 DIAGNOSIS — Z12.31 VISIT FOR SCREENING MAMMOGRAM: ICD-10-CM

## 2022-02-21 PROCEDURE — 99214 OFFICE O/P EST MOD 30 MIN: CPT | Performed by: FAMILY MEDICINE

## 2022-02-21 ASSESSMENT — COLUMBIA-SUICIDE SEVERITY RATING SCALE - C-SSRS
1. WITHIN THE PAST MONTH, HAVE YOU WISHED YOU WERE DEAD OR WISHED YOU COULD GO TO SLEEP AND NOT WAKE UP?: NO
6. HAVE YOU EVER DONE ANYTHING, STARTED TO DO ANYTHING, OR PREPARED TO DO ANYTHING TO END YOUR LIFE?: NO
2. IN THE PAST MONTH, HAVE YOU ACTUALLY HAD ANY THOUGHTS OF KILLING YOURSELF?: NO

## 2022-02-21 ASSESSMENT — ENCOUNTER SYMPTOMS: NERVOUS/ANXIOUS: 1

## 2022-02-21 ASSESSMENT — PATIENT HEALTH QUESTIONNAIRE - PHQ9
SUM OF ALL RESPONSES TO PHQ QUESTIONS 1-9: 12
SUM OF ALL RESPONSES TO PHQ QUESTIONS 1-9: 12
10. IF YOU CHECKED OFF ANY PROBLEMS, HOW DIFFICULT HAVE THESE PROBLEMS MADE IT FOR YOU TO DO YOUR WORK, TAKE CARE OF THINGS AT HOME, OR GET ALONG WITH OTHER PEOPLE: SOMEWHAT DIFFICULT

## 2022-02-21 NOTE — PROGRESS NOTES
Assessment & Plan   Situational mixed anxiety and depressive disorder   Increased stress around home -fleeting suicidal thoughts but certainly no plan will increase fluoxetine to 20 mg and she is open to the idea of therapy-referral given.  - FLUoxetine (PROZAC) 20 MG capsule  Dispense: 90 capsule; Refill: 1  - Adult Mental Health  Referral    Stage 3 chronic kidney disease, unspecified whether stage 3a or 3b CKD (H)  Blood pressure better controlled and will continue meds.  Continue to monitor labs.      Visit for screening mammogram  Due for screening:  - MA Screen Bilateral w/[facility]    Screen for colon cancer  - Fecal colorectal cancer screen FIT - Future (S+30)  - Fecal colorectal cancer screen FIT - Future (S+30)      Depression Screening Follow Up    PHQ 2/21/2022   PHQ-9 Total Score 12   Q9: Thoughts of better off dead/self-harm past 2 weeks Several days   F/U: Thoughts of suicide or self-harm No   F/U: Safety concerns No     Last PHQ-9 2/21/2022   1.  Little interest or pleasure in doing things 2   2.  Feeling down, depressed, or hopeless 1   3.  Trouble falling or staying asleep, or sleeping too much 2   4.  Feeling tired or having little energy 2   5.  Poor appetite or overeating 2   6.  Feeling bad about yourself 2   7.  Trouble concentrating 0   8.  Moving slowly or restless 0   Q9: Thoughts of better off dead/self-harm past 2 weeks 1   PHQ-9 Total Score 12   Difficulty at work, home, or with people -   In the past two weeks have you had thoughts of suicide or self harm? No   Do you have concerns about your personal safety or the safety of others? No         C-SSRS (Brief [place]) 2/21/2022   Within the last month, have you wished you were dead or wished you could go to sleep and not wake up? No   Within the last month, have you had any actual thoughts of killing yourself? No   Within the last month, have you ever done anything, started to do anything, or prepared to do anything to end your  life? No       Follow Up     Follow Up Actions Taken  Crisis resource information provided in the After Visit Summary    Discussed the following ways the patient can remain in a safe environment:  be around others    Return in about 1 month (around 3/21/2022) for wellness exam with fasting labs with Marcial Perdue MD, and, medication recheck, with Dr Marcial Perdue.      Marcial Perdue MD      [address] [address]  [employer]   Office: [phone]       Sam Seymour is a 72 year old who presents for the following health issues vi0    Anxiety    History of Present Illness       Hypertension: She presents for follow up of hypertension.  She does not check blood pressure  regularly outside of the clinic. Outside blood pressures have been over 140/90. She does not follow a low salt diet.     She eats 0-1 servings of fruits and vegetables daily.She consumes 1 sweetened beverage(s) daily.She exercises with enough effort to increase her heart rate 10 to 19 minutes per day.  She exercises with enough effort to increase her heart rate 4 days per week. She is missing 1 dose(s) of medications per week.     Answers for HPI/ROS submitted by the patient on 2/21/2022  If you checked off any problems, how difficult have these problems made it for you to do your work, take care of things at home, or get along with other people?: Somewhat difficult  PHQ9 TOTAL SCORE: 12      Hyperlipidemia Follow-Up      Are you regularly taking any medication or supplement to lower your cholesterol?   Yes- Crestor    Are you having muscle aches or other side effects that you think could be caused by your cholesterol lowering medication?  No    Depression and Anxiety Follow-Up    How are you doing with your depression since your last visit? Worsened - feeling like its bad - states she has been feeling like she has been getting sick all winter and its making her depressed    How are you doing  with your anxiety since your last visit?  No change    Are you having other symptoms that might be associated with depression or anxiety?     Have you had a significant life event? Health Concerns     Do you have any concerns with your use of alcohol or other drugs? No    Social History     Tobacco Use     Smoking status: Former Smoker     Packs/day: 1.00     Years: 31.00     Pack years: 31.00     Types: Cigarettes     Quit date: 6/14/2018     Years since quitting: 3.6     Smokeless tobacco: Never Used   Substance Use Topics     Alcohol use: Yes     Comment: 1 glass of wine per month     Drug use: No     PHQ 1/25/2021 6/15/2021 2/21/2022   PHQ-9 Total Score 10 5 12   Q9: Thoughts of better off dead/self-harm past 2 weeks Not at all Not at all Several days   F/U: Thoughts of suicide or self-harm - - No   F/U: Safety concerns - - No     OBINNA-7 SCORE 1/9/2020 2/6/2020 1/25/2021   Total Score 11 4 5     Last PHQ-9 2/21/2022   1.  Little interest or pleasure in doing things 2   2.  Feeling down, depressed, or hopeless 1   3.  Trouble falling or staying asleep, or sleeping too much 2   4.  Feeling tired or having little energy 2   5.  Poor appetite or overeating 2   6.  Feeling bad about yourself 2   7.  Trouble concentrating 0   8.  Moving slowly or restless 0   Q9: Thoughts of better off dead/self-harm past 2 weeks 1   PHQ-9 Total Score 12   Difficulty at work, home, or with people -   In the past two weeks have you had thoughts of suicide or self harm? No   Do you have concerns about your personal safety or the safety of others? No     OBINNA-7  1/25/2021   1. Feeling nervous, anxious, or on edge 1   2. Not being able to stop or control worrying 1   3. Worrying too much about different things 1   4. Trouble relaxing 1   5. Being so restless that it is hard to sit still 0   6. Becoming easily annoyed or irritable 0   7. Feeling afraid, as if something awful might happen 1   OBINNA-7 Total Score 5   If you checked any problems,  "how difficult have they made it for you to do your work, take care of things at home, or get along with other people? Not difficult at all        C-SSRS (Brief [place]) 2/21/2022   Within the last month, have you wished you were dead or wished you could go to sleep and not wake up? No   Within the last month, have you had any actual thoughts of killing yourself? No   Within the last month, have you ever done anything, started to do anything, or prepared to do anything to end your life? No       Follow Up Actions Taken  Crisis resource information provided in the After Visit Summary     Discussed the following ways the patient can remain in a safe environment:  be around others  Suicide Assessment Five-step Evaluation and Treatment (SAFE-T)    Review of Systems   Psychiatric/Behavioral: The patient is nervous/anxious.       Constitutional, HEENT, cardiovascular, pulmonary, gi and gu systems are negative, except as otherwise noted.      Objective    /62 (BP Location: Left arm, Cuff Size: Adult Large)   Pulse 76   Temp 97.1  F (36.2  C) (Tympanic)   Ht 1.715 m (5' 7.52\")   Wt 91.6 kg (202 lb)   SpO2 97%   BMI 31.15 kg/m    Body mass index is 31.15 kg/m .  Physical Exam   GENERAL: healthy, alert and no distress  HENT: ear canals and TM's normal, nose and mouth without ulcers or lesions  NECK: no adenopathy, no asymmetry, masses, or scars and thyroid normal to palpation  RESP: lungs clear to auscultation - no rales, rhonchi or wheezes  CV: regular rate and rhythm, normal S1 S2, no S3 or S4, no murmur, click or rub, no peripheral edema and peripheral pulses strong  ABDOMEN: soft, nontender, no hepatosplenomegaly, no masses and bowel sounds normal  MS: no gross musculoskeletal defects noted, no edema  SKIN: no suspicious lesions or rashes  NEURO: Normal strength and tone, mentation intact and speech normal  BACK: no CVA tenderness, no paralumbar tenderness  PSYCH: mentation appears normal, affect " normal/bright

## 2022-03-12 ENCOUNTER — HEALTH MAINTENANCE LETTER (OUTPATIENT)
Age: 72
End: 2022-03-12

## 2022-04-14 ENCOUNTER — OFFICE VISIT (OUTPATIENT)
Dept: FAMILY MEDICINE | Facility: CLINIC | Age: 72
End: 2022-04-14
Payer: MEDICARE

## 2022-04-14 VITALS
OXYGEN SATURATION: 96 % | BODY MASS INDEX: 32.14 KG/M2 | SYSTOLIC BLOOD PRESSURE: 168 MMHG | TEMPERATURE: 97.2 F | HEART RATE: 80 BPM | HEIGHT: 66 IN | DIASTOLIC BLOOD PRESSURE: 80 MMHG | WEIGHT: 200 LBS

## 2022-04-14 DIAGNOSIS — L40.9 PSORIASIS: ICD-10-CM

## 2022-04-14 DIAGNOSIS — Z00.00 ENCOUNTER FOR MEDICARE ANNUAL WELLNESS EXAM: Primary | ICD-10-CM

## 2022-04-14 DIAGNOSIS — I10 HYPERTENSION GOAL BP (BLOOD PRESSURE) < 130/80: ICD-10-CM

## 2022-04-14 DIAGNOSIS — Z23 HIGH PRIORITY FOR 2019-NCOV VACCINE: ICD-10-CM

## 2022-04-14 DIAGNOSIS — E78.5 HYPERLIPIDEMIA LDL GOAL <100: ICD-10-CM

## 2022-04-14 DIAGNOSIS — F43.23 SITUATIONAL MIXED ANXIETY AND DEPRESSIVE DISORDER: ICD-10-CM

## 2022-04-14 LAB
ERYTHROCYTE [DISTWIDTH] IN BLOOD BY AUTOMATED COUNT: 12.7 % (ref 10–15)
HCT VFR BLD AUTO: 37.2 % (ref 35–47)
HGB BLD-MCNC: 12.4 G/DL (ref 11.7–15.7)
MCH RBC QN AUTO: 31.2 PG (ref 26.5–33)
MCHC RBC AUTO-ENTMCNC: 33.3 G/DL (ref 31.5–36.5)
MCV RBC AUTO: 94 FL (ref 78–100)
PLATELET # BLD AUTO: 283 10E3/UL (ref 150–450)
RBC # BLD AUTO: 3.97 10E6/UL (ref 3.8–5.2)
WBC # BLD AUTO: 9 10E3/UL (ref 4–11)

## 2022-04-14 PROCEDURE — 82043 UR ALBUMIN QUANTITATIVE: CPT | Performed by: FAMILY MEDICINE

## 2022-04-14 PROCEDURE — 80061 LIPID PANEL: CPT | Performed by: FAMILY MEDICINE

## 2022-04-14 PROCEDURE — 91305 COVID-19,PF,PFIZER (12+ YRS): CPT | Performed by: FAMILY MEDICINE

## 2022-04-14 PROCEDURE — G0439 PPPS, SUBSEQ VISIT: HCPCS | Performed by: FAMILY MEDICINE

## 2022-04-14 PROCEDURE — 85027 COMPLETE CBC AUTOMATED: CPT | Performed by: FAMILY MEDICINE

## 2022-04-14 PROCEDURE — 99214 OFFICE O/P EST MOD 30 MIN: CPT | Mod: 25 | Performed by: FAMILY MEDICINE

## 2022-04-14 PROCEDURE — 0054A COVID-19,PF,PFIZER (12+ YRS): CPT | Performed by: FAMILY MEDICINE

## 2022-04-14 PROCEDURE — 36415 COLL VENOUS BLD VENIPUNCTURE: CPT | Performed by: FAMILY MEDICINE

## 2022-04-14 PROCEDURE — 80053 COMPREHEN METABOLIC PANEL: CPT | Performed by: FAMILY MEDICINE

## 2022-04-14 RX ORDER — ROSUVASTATIN CALCIUM 5 MG/1
5 TABLET, COATED ORAL DAILY
Qty: 90 TABLET | Refills: 3 | Status: SHIPPED | OUTPATIENT
Start: 2022-04-14

## 2022-04-14 RX ORDER — LOSARTAN POTASSIUM 100 MG/1
100 TABLET ORAL DAILY
Qty: 90 TABLET | Refills: 3 | Status: SHIPPED | OUTPATIENT
Start: 2022-04-14

## 2022-04-14 RX ORDER — AMLODIPINE BESYLATE 10 MG/1
10 TABLET ORAL DAILY
Qty: 90 TABLET | Refills: 3 | Status: SHIPPED | OUTPATIENT
Start: 2022-04-14 | End: 2022-04-14

## 2022-04-14 RX ORDER — POTASSIUM CHLORIDE 1500 MG/1
20 TABLET, EXTENDED RELEASE ORAL DAILY
Qty: 90 TABLET | Refills: 3 | Status: SHIPPED | OUTPATIENT
Start: 2022-04-14

## 2022-04-14 RX ORDER — TRIAMTERENE AND HYDROCHLOROTHIAZIDE 37.5; 25 MG/1; MG/1
CAPSULE ORAL
Qty: 90 CAPSULE | Refills: 3 | Status: SHIPPED | OUTPATIENT
Start: 2022-04-14

## 2022-04-14 ASSESSMENT — ENCOUNTER SYMPTOMS
ABDOMINAL PAIN: 0
FEVER: 0
PARESTHESIAS: 0
SORE THROAT: 0
HEARTBURN: 0
CHILLS: 0
EYE PAIN: 0
FREQUENCY: 1
CONSTIPATION: 0
DIZZINESS: 0
HEMATOCHEZIA: 0
HEMATURIA: 0
NERVOUS/ANXIOUS: 1
PALPITATIONS: 0
SHORTNESS OF BREATH: 0
ARTHRALGIAS: 0
JOINT SWELLING: 0
NAUSEA: 0
BREAST MASS: 0
DIARRHEA: 0
DYSURIA: 0
WEAKNESS: 0
COUGH: 0
MYALGIAS: 0
HEADACHES: 0

## 2022-04-14 ASSESSMENT — PATIENT HEALTH QUESTIONNAIRE - PHQ9
SUM OF ALL RESPONSES TO PHQ QUESTIONS 1-9: 4
SUM OF ALL RESPONSES TO PHQ QUESTIONS 1-9: 4
10. IF YOU CHECKED OFF ANY PROBLEMS, HOW DIFFICULT HAVE THESE PROBLEMS MADE IT FOR YOU TO DO YOUR WORK, TAKE CARE OF THINGS AT HOME, OR GET ALONG WITH OTHER PEOPLE: SOMEWHAT DIFFICULT

## 2022-04-14 ASSESSMENT — ANXIETY QUESTIONNAIRES
GAD7 TOTAL SCORE: 5
3. WORRYING TOO MUCH ABOUT DIFFERENT THINGS: SEVERAL DAYS
2. NOT BEING ABLE TO STOP OR CONTROL WORRYING: SEVERAL DAYS
1. FEELING NERVOUS, ANXIOUS, OR ON EDGE: SEVERAL DAYS
GAD7 TOTAL SCORE: 5
6. BECOMING EASILY ANNOYED OR IRRITABLE: NOT AT ALL
4. TROUBLE RELAXING: SEVERAL DAYS
7. FEELING AFRAID AS IF SOMETHING AWFUL MIGHT HAPPEN: SEVERAL DAYS
GAD7 TOTAL SCORE: 5
7. FEELING AFRAID AS IF SOMETHING AWFUL MIGHT HAPPEN: SEVERAL DAYS
5. BEING SO RESTLESS THAT IT IS HARD TO SIT STILL: NOT AT ALL

## 2022-04-14 ASSESSMENT — ACTIVITIES OF DAILY LIVING (ADL): CURRENT_FUNCTION: NO ASSISTANCE NEEDED

## 2022-04-14 NOTE — PATIENT INSTRUCTIONS
Patient Education   Personalized Prevention Plan  You are due for the preventive services outlined below.  Your care team is available to assist you in scheduling these services.  If you have already completed any of these items, please share that information with your care team to update in your medical record.  Health Maintenance Due   Topic Date Due     Zoster (Shingles) Vaccine (1 of 2) Never done     LUNG CANCER SCREENING  Never done     Colorectal Cancer Screening  06/15/2019     Mammogram  06/21/2019     Comprehensive Metabolic Panel  01/09/2021     Flu Vaccine (1) 09/01/2021     COVID-19 Vaccine (3 - Booster for Pfizer series) 11/10/2021     Basic Metabolic Panel  01/25/2022     Cholesterol Lab  01/25/2022     Kidney Microalbumin Urine Test  01/25/2022     Complete Blood Count  01/25/2022     Hemoglobin  01/25/2022       Understanding USDA MyPlate  The USDA has guidelines to help you make healthy food choices. These are called MyPlate. MyPlate shows the food groups that make up healthy meals using the image of a place setting. Before you eat, think about the healthiest choices for what to put on your plate or in your cup or bowl. To learn more about building a healthy plate, visit www.choosemyplate.gov.    The food groups    Fruits. Any fruit or 100% fruit juice counts as part of the Fruit Group. Fruits may be fresh, canned, frozen, or dried, and may be whole, cut-up, or pureed. Make 1/2 of your plate fruits and vegetables.    Vegetables. Any vegetable or 100% vegetable juice counts as a member of the Vegetable Group. Vegetables may be fresh, frozen, canned, or dried. They can be served raw or cooked and may be whole, cut-up, or mashed. Make 1/2 of your plate fruits and vegetables.    Grains. All foods made from grains are part of the Grains Group. These include wheat, rice, oats, cornmeal, and barley. Grains are often used to make foods such as bread, pasta, oatmeal, cereal, tortillas, and grits. Grains  should be no more than 1/4 of your plate. At least half of your grains should be whole grains.    Protein. This group includes meat, poultry, seafood, beans and peas, eggs, processed soy products (such as tofu), nuts (including nut butters), and seeds. Make protein choices no more than 1/4 of your plate. Meat and poultry choices should be lean or low fat.    Dairy. The Dairy Group includes all fluid milk products and foods made from milk that contain calcium, such as yogurt and cheese. (Foods that have little calcium, such as cream, butter, and cream cheese, are not part of this group.) Most dairy choices should be low-fat or fat-free.    Oils. Oils aren't a food group, but they do contain essential nutrients. However it's important to watch your intake of oils. These are fats that are liquid at room temperature. They include canola, corn, olive, soybean, vegetable, and sunflower oil. Foods that are mainly oil include mayonnaise, certain salad dressings, and soft margarines. You likely already get your daily oil allowance from the foods you eat.  Things to limit  Eating healthy also means limiting these things in your diet:       Salt (sodium). Many processed foods have a lot of sodium. To keep sodium intake down, eat fresh vegetables, meats, poultry, and seafood when possible. Purchase low-sodium, reduced-sodium, or no-salt-added food products at the store. And don't add salt to your meals at home. Instead, season them with herbs and spices such as dill, oregano, cumin, and paprika. Or try adding flavor with lemon or lime zest and juice.    Saturated fat. Saturated fats are most often found in animal products such as beef, pork, and chicken. They are often solid at room temperature, such as butter. To reduce your saturated fat intake, choose leaner cuts of meat and poultry. And try healthier cooking methods such as grilling, broiling, roasting, or baking. For a simple lower-fat swap, use plain nonfat yogurt instead  of mayonnaise when making potato salad or macaroni salad.    Added sugars. These are sugars added to foods. They are in foods such as ice cream, candy, soda, fruit drinks, sports drinks, energy drinks, cookies, pastries, jams, and syrups. Cut down on added sugars by sharing sweet treats with a family member or friend. You can also choose fruit for dessert, and drink water or other unsweetened beverages.     [employer] last reviewed this educational content on 6/1/2020 2000-2021 [employer]. All rights reserved. This information is not intended as a substitute for professional medical care. Always follow your healthcare professional's instructions.          Urinary Incontinence, Female (Adult)   Urinary incontinence means loss of bladder control. This problem affects many women, especially as they get older. If you have incontinence, you may be embarrassed to ask for help. But know that this problem can be treated.   Types of Incontinence  There are different types of incontinence. Two of the main types are described here. You can have more than one type.     Stress incontinence. With this type, urine leaks when pressure (stress) is put on the bladder. This may happen when you cough, sneeze, or laugh. Stress incontinence most often occurs because the pelvic floor muscles that support the bladder and urethra are weak. This can happen after pregnancy and vaginal childbirth or a hysterectomy. It can also be due to excess body weight or hormone changes.    Urge incontinence (also called overactive bladder). With this type, a sudden urge to urinate is felt often. This may happen even though there may not be much urine in the bladder. The need to urinate often during the night is common. Urge incontinence most often occurs because of bladder spasms. This may be due to bladder irritation or infection. Damage to bladder nerves or pelvic muscles, constipation, and certain medicines can also lead to urge  incontinence.  Treatment depends on the cause. Further evaluation is needed to find the type you have. This will likely include an exam and certain tests. Based on the results, you and your healthcare provider can then plan treatment. Until a diagnosis is made, the home care tips below can help ease symptoms.   Home care    Do pelvic floor muscle exercises, if they are prescribed. The pelvic floor muscles help support the bladder and urethra. Many women find that their symptoms improve when doing special exercises that strengthen these muscles. To do the exercises, contract the muscles you would use to stop your stream of urine. But do this when you re not urinating. Hold for 10 seconds, then relax. Repeat 10 to 20 times in a row, at least 3 times a day. Your healthcare provider may give you other instructions for how to do the exercises and how often.    Keep a bladder diary. This helps track how often and how much you urinate over a set period of time. Bring this diary with you to your next visit with the provider. The information can help your provider learn more about your bladder problem.    Lose weight, if advised to by your provider. Extra weight puts pressure on the bladder. Your provider can help you create a weight-loss plan that s right for you. This may include exercising more and making certain diet changes.    Don't have foods and drinks that may irritate the bladder. These can include alcohol and caffeinated drinks.    Quit smoking. Smoking and other tobacco use can lead to a long-term (chronic) cough that strains the pelvic floor muscles. Smoking may also damage the bladder and urethra. Talk with your provider about treatments or methods you can use to quit smoking.    If drinking large amounts of fluid makes you have symptoms, you may be advised to limit your fluid intake. You may also be advised to drink most of your fluids during the day and to limit fluids at night.    If you re worried about  urine leakage or accidents, you may wear absorbent pads to catch urine. Change the pads often. This helps reduce discomfort. It may also reduce the risk of skin or bladder infections.    Follow-up care  Follow up with your healthcare provider, or as directed. It may take some to find the right treatment for your problem. But healthy lifestyle changes can be made right away. These include such things as exercising on a regular basis, eating a healthy diet, losing weight (if needed), and quitting smoking. Your treatment plan may include special therapies or medicines. Certain procedures or surgery may also be options. Talk about any questions you have with your provider.   When to seek medical advice  Call the healthcare provider right away if any of these occur:    Fever of 100.4 F (38 C) or higher, or as directed by your provider    Bladder pain or fullness    Belly swelling    Nausea or vomiting    Back pain    Weakness, dizziness, or fainting  [employer] last reviewed this educational content on 1/1/2020 2000-2021 [employer]. All rights reserved. This information is not intended as a substitute for professional medical care. Always follow your healthcare professional's instructions.

## 2022-04-14 NOTE — PROGRESS NOTES
"SUBJECTIVE:   Miroslava Handy is a 72 year old female who presents for Preventive Visit.      Patient has been advised of split billing requirements and indicates understanding: Yes  Are you in the first 12 months of your Medicare coverage?  No    Healthy Habits:     In general, how would you rate your overall health?  Good    Frequency of exercise:  2-3 days/week    Duration of exercise:  Less than 15 minutes    Do you usually eat at least 4 servings of fruit and vegetables a day, include whole grains    & fiber and avoid regularly eating high fat or \"junk\" foods?  No    Taking medications regularly:  Yes    Medication side effects:  None    Ability to successfully perform activities of daily living:  No assistance needed    Home Safety:  No safety concerns identified    Hearing Impairment:  No hearing concerns    In the past 6 months, have you been bothered by leaking of urine? Yes    In general, how would you rate your overall mental or emotional health?  Good      PHQ-2 Total Score: 1    Additional concerns today:  No    Do you feel safe in your environment? Yes    Have you ever done Advance Care Planning? (For example, a Health Directive, POLST, or a discussion with a medical provider or your loved ones about your wishes): No, advance care planning information given to patient to review.  Patient declined advance care planning discussion at this time.    Fall risk     click delete button to remove this line now  Cognitive Screening   1) Repeat 3 items (Leader, Season, Table)    2) Clock draw: NORMAL  3) 3 item recall: Recalls 3 objects  Results: 3 items recalled: COGNITIVE IMPAIRMENT LESS LIKELY    Mini-CogTM Copyright REJI Dumas. Licensed by the author for use in [facility]; reprinted with permission (donald@.[facility]). All rights reserved.      Do you have sleep apnea, excessive snoring or daytime drowsiness?: Snoring    Reviewed and updated as needed this visit by clinical staff   Tobacco  Allergies "  Meds  Problems  Med Hx  Surg Hx  Fam Hx            Reviewed and updated as needed this visit by Provider   Tobacco  Allergies  Meds  Problems  Med Hx  Surg Hx  Fam Hx           Social History     Tobacco Use     Smoking status: Former Smoker     Packs/day: 1.00     Years: 31.00     Pack years: 31.00     Types: Cigarettes     Quit date: 6/14/2018     Years since quitting: 3.8     Smokeless tobacco: Never Used   Substance Use Topics     Alcohol use: Yes     Comment: 1 glass of wine per month     If you drink alcohol do you typically have >3 drinks per day or >7 drinks per week? No    Alcohol Use 4/14/2022   Prescreen: >3 drinks/day or >7 drinks/week? No   Prescreen: >3 drinks/day or >7 drinks/week? -   No flowsheet data found.        Hyperlipidemia Follow-Up      Are you regularly taking any medication or supplement to lower your cholesterol?   Yes- rosuvastatin (CRESTOR) 5 MG tablet    Are you having muscle aches or other side effects that you think could be caused by your cholesterol lowering medication?  No    Hypertension Follow-up      Do you check your blood pressure regularly outside of the clinic? Yes     Are you following a low salt diet? Yes    Are your blood pressures ever more than 140 on the top number (systolic) OR more   than 90 on the bottom number (diastolic), for example 140/90? Yes    BP Readings from Last 2 Encounters:   04/14/22 (!) 168/80   02/21/22 118/62     LDL Cholesterol Calculated (mg/dL)   Date Value   04/14/2022 115 (H)   01/25/2021 85   01/09/2020 190 (H)       Depression and Anxiety Follow-Up    How are you doing with your depression since your last visit? No change    How are you doing with your anxiety since your last visit?  Worsened, anxious about moving    Are you having other symptoms that might be associated with depression or anxiety? No    Have you had a significant life event? Housing Concerns and OTHER: patients is moving and very anxious about that.      Do you  have any concerns with your use of alcohol or other drugs? No    Social History     Tobacco Use     Smoking status: Former Smoker     Packs/day: 1.00     Years: 31.00     Pack years: 31.00     Types: Cigarettes     Quit date: 6/14/2018     Years since quitting: 3.8     Smokeless tobacco: Never Used   Substance Use Topics     Alcohol use: Yes     Comment: 1 glass of wine per month     Drug use: No     PHQ 6/15/2021 2/21/2022 4/14/2022   PHQ-9 Total Score 5 12 4   Q9: Thoughts of better off dead/self-harm past 2 weeks Not at all Several days Not at all   F/U: Thoughts of suicide or self-harm - No -   F/U: Safety concerns - No -     OBINNA-7 SCORE 2/6/2020 1/25/2021 4/14/2022   Total Score - - 5 (mild anxiety)   Total Score 4 5 5     Last PHQ-9 4/14/2022   1.  Little interest or pleasure in doing things 0   2.  Feeling down, depressed, or hopeless 1   3.  Trouble falling or staying asleep, or sleeping too much 1   4.  Feeling tired or having little energy 0   5.  Poor appetite or overeating 1   6.  Feeling bad about yourself 1   7.  Trouble concentrating 0   8.  Moving slowly or restless 0   Q9: Thoughts of better off dead/self-harm past 2 weeks 0   PHQ-9 Total Score 4   Difficulty at work, home, or with people -   In the past two weeks have you had thoughts of suicide or self harm? -   Do you have concerns about your personal safety or the safety of others? -     OBINNA-7  4/14/2022   1. Feeling nervous, anxious, or on edge 1   2. Not being able to stop or control worrying 1   3. Worrying too much about different things 1   4. Trouble relaxing 1   5. Being so restless that it is hard to sit still 0   6. Becoming easily annoyed or irritable 0   7. Feeling afraid, as if something awful might happen 1   OBINNA-7 Total Score 5   If you checked any problems, how difficult have they made it for you to do your work, take care of things at home, or get along with other people? -     Suicide Assessment Five-step Evaluation and  "Treatment (SAFE-T)    Current providers sharing in care for this patient include:   Patient Care Team:  Rome Perdue MD as PCP - General  Rome Perdue MD as Assigned PCP    The following health maintenance items are reviewed in Epic and correct as of today:  Health Maintenance Due   Topic Date Due     ZOSTER IMMUNIZATION (1 of 2) Never done     LUNG CANCER SCREENING  Never done     COLORECTAL CANCER SCREENING  06/15/2019     MAMMO SCREENING  06/21/2019     INFLUENZA VACCINE (1) 09/01/2021       Review of Systems   Constitutional: Negative for chills and fever.   HENT: Negative for congestion, ear pain, hearing loss and sore throat.    Eyes: Negative for pain and visual disturbance.   Respiratory: Negative for cough and shortness of breath.    Cardiovascular: Positive for peripheral edema. Negative for chest pain and palpitations.   Gastrointestinal: Negative for abdominal pain, constipation, diarrhea, heartburn, hematochezia and nausea.   Breasts:  Negative for tenderness, breast mass and discharge.   Genitourinary: Positive for frequency. Negative for dysuria, genital sores, hematuria, pelvic pain, urgency, vaginal bleeding and vaginal discharge.   Musculoskeletal: Negative for arthralgias, joint swelling and myalgias.   Skin: Negative for rash.   Neurological: Negative for dizziness, weakness, headaches and paresthesias.   Psychiatric/Behavioral: Negative for mood changes. The patient is nervous/anxious.      OBJECTIVE:   BP (!) 168/80 (BP Location: Left arm, Patient Position: Sitting, Cuff Size: Adult Regular)   Pulse 80   Temp 97.2  F (36.2  C) (Tympanic)   Ht 1.683 m (5' 6.25\")   Wt 90.7 kg (200 lb)   SpO2 96%   BMI 32.04 kg/m   Estimated body mass index is 32.04 kg/m  as calculated from the following:    Height as of this encounter: 1.683 m (5' 6.25\").    Weight as of this encounter: 90.7 kg (200 lb).  EXAM:   GENERAL APPEARANCE: healthy, alert and no distress  EYES: Eyes grossly normal to " inspection, PERRL and conjunctivae and sclerae normal  HENT: ear canals and TM's normal, nose and mouth without ulcers or lesions, oropharynx clear and oral mucous membranes moist  NECK: no adenopathy, no asymmetry, masses, or scars and thyroid normal to palpation  RESP: lungs clear to auscultation - no rales, rhonchi or wheezes  CV: regular rate and rhythm, normal S1 S2, no S3 or S4, no murmur, click or rub, no peripheral edema and peripheral pulses strong  ABDOMEN: soft, nontender, no hepatosplenomegaly, no masses and bowel sounds normal  MS: no musculoskeletal defects are noted and gait is age appropriate without ataxia  SKIN: no suspicious lesions o6r rashes  NEURO: Normal strength and tone, sensory exam grossly normal, mentation intact and speech normal  PSYCH: mentation appears normal and affect normal/bright  LYMPHATICS: ant. cervical- normal, post. cervical- normal, axillary- normal, supraclavicular- normal, inguinal- normal  BREAST: deferred - done at gyn   (female): deferred - done at gyn      ASSESSMENT / PLAN:   Encounter for Medicare annual wellness exam    Hypertension goal BP (blood pressure) < 130/80  Elevated today, she did not take her blood pressure medicines yet.  Recommended she follow-up within the next week or so to have that rechecked on medicine.  - COMPREHENSIVE METABOLIC PANEL  - Albumin Random Urine Quantitative with Creat Ratio  - CBC with Platelets  - triamterene-HCTZ (DYAZIDE) 37.5-25 MG capsule  Dispense: 90 capsule; Refill: 3  - potassium chloride ER (KLOR-CON) 20 MEQ CR tablet  Dispense: 90 tablet; Refill: 3  - losartan (COZAAR) 100 MG tablet  Dispense: 90 tablet; Refill: 3  - COMPREHENSIVE METABOLIC PANEL  - Albumin Random Urine Quantitative with Creat Ratio  - CBC with Platelets    Situational mixed anxiety and depressive disorder  Controlled - continue medication(s).  - FLUoxetine (PROZAC) 20 MG capsule  Dispense: 90 capsule; Refill: 3    Hyperlipidemia LDL goal  "<100  Controlled - continue medication(s).  - rosuvastatin (CRESTOR) 5 MG tablet  Dispense: 90 tablet; Refill: 3    Psoriasis  Medicine helps and will continue as needed use:  - betamethasone valerate (VALISONE) 0.1 % external ointment  Dispense: 45 g; Refill: 11    High priority for 2019-nCoV vaccine  - COVID-19,PF,PFIZER (12+ Yrs GRAY LABEL)      End of Life Planning:  Patient currently has an advanced directive: No.  I have verified the patient's ablity to prepare an advanced directive/make health care decisions.  Literature was provided to assist patient in preparing an advanced directive.    COUNSELING:  Reviewed preventive health counseling, as reflected in patient instructions    Estimated body mass index is 32.04 kg/m  as calculated from the following:    Height as of this encounter: 1.683 m (5' 6.25\").    Weight as of this encounter: 90.7 kg (200 lb).  Weight management plan: Discussed healthy diet and exercise guidelines     reports that she quit smoking about 3 years ago. Her smoking use included cigarettes. She has a 31.00 pack-year smoking history. She has never used smokeless tobacco.      Appropriate preventive services were discussed with this patient, including applicable screening as appropriate for cardiovascular disease, diabetes, osteopenia/osteoporosis, and glaucoma.  As appropriate for age/gender, discussed screening for colorectal cancer, prostate cancer, breast cancer, and cervical cancer. Checklist reviewing preventive services available has been given to the patient.    Reviewed patients plan of care and provided an AVS. The Basic Care Plan (routine screening as documented in Health Maintenance) for Miroslava meets the Care Plan requirement. This Care Plan has been established and reviewed with the Patient.    Return in about 53 weeks (around 4/20/2023) for Annual Wellness Visit.         Marcial Perdue MD     [address] " [address]  [email or web address]     Office: [phone]       Identified Health Risks:  Answers for HPI/ROS submitted by the patient on 4/14/2022  If you checked off any problems, how difficult have these problems made it for you to do your work, take care of things at home, or get along with other people?: Somewhat difficult  PHQ9 TOTAL SCORE: 4  OBINNA 7 TOTAL SCORE: 5

## 2022-04-15 LAB
ALBUMIN SERPL-MCNC: 3.7 G/DL (ref 3.4–5)
ALP SERPL-CCNC: 96 U/L (ref 40–150)
ALT SERPL W P-5'-P-CCNC: 31 U/L (ref 0–50)
ANION GAP SERPL CALCULATED.3IONS-SCNC: 5 MMOL/L (ref 3–14)
AST SERPL W P-5'-P-CCNC: 17 U/L (ref 0–45)
BILIRUB SERPL-MCNC: 0.6 MG/DL (ref 0.2–1.3)
BUN SERPL-MCNC: 24 MG/DL (ref 7–30)
CALCIUM SERPL-MCNC: 9.3 MG/DL (ref 8.5–10.1)
CHLORIDE BLD-SCNC: 109 MMOL/L (ref 94–109)
CHOLEST SERPL-MCNC: 184 MG/DL
CO2 SERPL-SCNC: 27 MMOL/L (ref 20–32)
CREAT SERPL-MCNC: 1.41 MG/DL (ref 0.52–1.04)
CREAT UR-MCNC: 235 MG/DL
FASTING STATUS PATIENT QL REPORTED: YES
GFR SERPL CREATININE-BSD FRML MDRD: 40 ML/MIN/1.73M2
GLUCOSE BLD-MCNC: 90 MG/DL (ref 70–99)
HDLC SERPL-MCNC: 32 MG/DL
LDLC SERPL CALC-MCNC: 115 MG/DL
MICROALBUMIN UR-MCNC: 105 MG/L
MICROALBUMIN/CREAT UR: 44.68 MG/G CR (ref 0–25)
NONHDLC SERPL-MCNC: 152 MG/DL
POTASSIUM BLD-SCNC: 4.5 MMOL/L (ref 3.4–5.3)
PROT SERPL-MCNC: 7.7 G/DL (ref 6.8–8.8)
SODIUM SERPL-SCNC: 141 MMOL/L (ref 133–144)
TRIGL SERPL-MCNC: 187 MG/DL

## 2022-04-15 ASSESSMENT — ANXIETY QUESTIONNAIRES: GAD7 TOTAL SCORE: 5

## 2022-04-15 ASSESSMENT — PATIENT HEALTH QUESTIONNAIRE - PHQ9: SUM OF ALL RESPONSES TO PHQ QUESTIONS 1-9: 4

## 2022-04-19 NOTE — RESULT ENCOUNTER NOTE
Dear Dawn,    Here is a summary of your recent test results:  -Normal red blood cell (hgb) levels, normal white blood cell count and normal platelet levels.  -Cholesterol levels are at your goal levels.  ADVISE: continuing your medication, a regular exercise program with at least 150 minutes of aerobic exercise per week, and eating a low saturated fat/low carbohydrate diet.  Also, you should recheck this fasting cholesterol panel in 12 months.  -Liver and gallbladder tests (ALT,AST, Alk phos,bilirubin) are normal.  -Kidney function (GFR) is decreased.  ADVISE: rechecking this in 6 months  -Sodium is normal.  -Potassium is normal.  -Calcium is normal.  -Glucose (diabetic screening test) is normal.  -Microalbumin (urine protein) level is elevated. This is suggestive of early damage to your kidneys from high blood pressure.  ADVISE: avoiding anti-inflamatory agents such as ibuprofen (Advil, Motrin) or naproxen (Aleve) as much as possible, keeping your blood pressure in a normal range, and continuing your medication (losartan) that helps protect your kidneys.  Also, this should be rechecked in 1 year.     For additional lab test information, www.testing.com is a very good reference.    In addition, here is a list of due or overdue Health Maintenance reminders:  Zoster (Shingles) Vaccine(1 of 2) Never done  LUNG CANCER SCREENING Never done  Colorectal Cancer Screening due on 06/15/2019  Mammogram due on 06/21/2019  Flu Vaccine(1) due on 09/01/2021    Please call us at [phone] (or use [email or web address]) to address the above recommendations if needed.           Thank you very much for trusting me and [facility].     Have a peaceful day.    Healthy regards,  Marcial Perdue MD

## 2022-07-09 DIAGNOSIS — I10 HYPERTENSION GOAL BP (BLOOD PRESSURE) < 130/80: ICD-10-CM

## 2022-07-12 NOTE — TELEPHONE ENCOUNTER
Routing refill request to provider for review/approval because:  Beta-Blockers Protocol Failed 07/09/2022 10:38 AM   Protocol Details  Blood pressure under 140/90 in past 12 months     BP Readings from Last 3 Encounters:   04/14/22 (!) 168/80   02/21/22 118/62   01/07/22 (!) 163/74     Pt needs a nurse only BP check - please schedule this then route refill to PCP     Thank you     Idania Reynolds RN, BSN  [facility]

## 2022-07-13 ENCOUNTER — MYC MEDICAL ADVICE (OUTPATIENT)
Dept: FAMILY MEDICINE | Facility: CLINIC | Age: 72
End: 2022-07-13

## 2022-07-13 NOTE — LETTER
July 25, 2022    Miroslava Handy  [address] [place] [address]    Dear Dawn,    We have attempted to contact you regarding a prescription refill request we have on file.  At this time we are not able to fill this medication until we see you in the office for a nurse only Blood Pressure check or medication review with blood pressure check with your provider.  Once an appointment is scheduled we can refill your medication for a 30 day supply. Please call us at [phone] to schedule an appointment or with any questions or concerns.    Thank you,    [facility] Care Team

## 2022-07-13 NOTE — LETTER
[address] [address] [phone]       July 25, 2022    Miroslava Handy  [address] [place] [address]    Dear Dawn,    We care about your health and have reviewed your health plan and are making recommendations based on this review, to optimize your health.    You are in particular need of attention regarding:  -Breast Cancer Screening  -Colon Cancer Screening    We are recommending that you:                                                                                                                                       -schedule a MAMMOGRAM which is due.         1 in 8 women will develop invasive breast cancer during her lifetime and it is the most common non-skin cancer in American women.  EARLY detection, new treatments, and a better understanding of the disease have increased survival rates - the 5 year survival rate in the 1960s was 63% and today it is close to 90%.          If you are under/uninsured, we recommend you contact the [facility]. They offer mammograms at no charge or on a sliding fee charge. You can schedule with them at [phone]. Please have them to send us the results.           Please disregard this reminder if you have had this exam elsewhere within the last year.  It would be helpful for us to have a copy of your mammogram report in our file so that we can best coordinate your care - please contact us with when your test was done so we can update your record.                                                                                                                      -schedule a COLONOSCOPY to look for colon cancer (due every 10 years or 5 years in higher risk situations.)        Colon cancer is now the second leading cause of cancer-related deaths in the [place] for both men and women and there are over 130,000 new cases and 50,000 deaths per year from colon cancer.  Colonoscopies can  prevent 90-95% of these deaths.  Problem lesions can be removed before they ever become cancer.  This test is not only looking for cancer, but also getting rid of precancerious lesions.      If you are under/uninsured, we recommend you contact the [employer] program. [employer] is a free colorectal cancer screening program that provides colonoscopies for eligible under/uninsured [place] men and women. If you are interested in receiving a free colonoscopy, please call [employer] at [phone] (mention code [employer]) to see if you re eligible.     If you do not wish to do a colonoscopy or cannot afford to do one, at this time, there is another option. It is called a FIT test or Fecal Immunochemical Occult Blood Test (take home stool sample kit).  It does not replace the colonoscopy for colorectal cancer screening, but it can detect hidden bleeding in the lower colon.  It does need to be repeated every year and if a positive result is obtained, you would be referred for a colonoscopy.       If you have completed either one of these tests at another facility, please call with the details of when and where the tests were done and if they were normal or not. Or have the records sent to our clinic so that we can best coordinate your care.    In addition, here is a list of due or overdue Health Maintenance reminders.    Health Maintenance Due   Topic Date Due     Zoster (Shingles) Vaccine (1 of 2) Never done     LUNG CANCER SCREENING  Never done     Colorectal Cancer Screening  06/15/2019     Mammogram  06/21/2019       To address the above recommendations, we encourage you to contact us at [phone], via [employer] or by contacting Central Scheduling toll free at [phone] 24 hours a day. They will assist you with finding the most convenient time and location.    Thank you for trusting [facility] and we appreciate the opportunity to serve you.  We look forward to supporting your  healthcare needs in the future.    Healthy Regards,    Your [facility] Team

## 2022-07-22 RX ORDER — METOPROLOL SUCCINATE 50 MG/1
TABLET, EXTENDED RELEASE ORAL
Qty: 90 TABLET | Refills: 0 | Status: SHIPPED | OUTPATIENT
Start: 2022-07-22 | End: 2022-11-09

## 2022-07-22 NOTE — TELEPHONE ENCOUNTER
Has upcoming appointment, medication sent.    Last visit in this dept:    4/14/2022     Next visit in this dept:   Next 5 appointments (look out 90 days)    Jul 26, 2022  4:00 PM  Nurse Only with RV RN VISITS  [facility] ([facility] ) [address] [address]  [phone]          Health Maintenance   Topic Date Due     ZOSTER IMMUNIZATION (1 of 2) Never done     LUNG CANCER SCREENING  Never done     COLORECTAL CANCER SCREENING  06/15/2019     MAMMO SCREENING  06/21/2019     COVID-19 Vaccine (4 - Booster for Pfizer series) 08/14/2022     INFLUENZA VACCINE (1) 09/01/2022     ANNUAL REVIEW OF HM ORDERS  02/21/2023     MEDICARE ANNUAL WELLNESS VISIT  04/14/2023     BMP  04/14/2023     CMP  04/14/2023     LIPID  04/14/2023     MICROALBUMIN  04/14/2023     FALL RISK ASSESSMENT  04/14/2023     CBC  04/14/2023     HEMOGLOBIN  04/14/2023     ADVANCE CARE PLANNING  04/18/2027     DTAP/TDAP/TD IMMUNIZATION (2 - Td or Tdap) 06/14/2028     DEXA  09/14/2035     HEPATITIS C SCREENING  Completed     PHQ-2 (once per calendar year)  Completed     Pneumococcal Vaccine: 65+ Years  Completed     URINALYSIS  Completed     IPV IMMUNIZATION  Aged Out     MENINGITIS IMMUNIZATION  Aged Out     HEPATITIS B IMMUNIZATION  Aged Out

## 2022-07-22 NOTE — TELEPHONE ENCOUNTER
Routing refill request to provider for review/approval because:  BP Readings from Last 3 Encounters:   04/14/22 (!) 168/80   02/21/22 118/62   01/07/22 (!) 163/74      Pt does have nurse only BP check appt scheduled    Yumiko Kern RN

## 2022-07-25 NOTE — TELEPHONE ENCOUNTER
Needs of attention regarding:  -Breast Cancer Screening  -Colon Cancer Screening    Health Maintenance Topics with due status: Overdue       Topic Date Due    ZOSTER IMMUNIZATION Never done    LUNG CANCER SCREENING Never done    COLORECTAL CANCER SCREENING 06/15/2019    MAMMO SCREENING 06/21/2019       Communication:  See Letter

## 2022-07-26 ENCOUNTER — ALLIED HEALTH/NURSE VISIT (OUTPATIENT)
Dept: NURSING | Facility: CLINIC | Age: 72
End: 2022-07-26
Payer: MEDICARE

## 2022-07-26 VITALS
RESPIRATION RATE: 20 BRPM | OXYGEN SATURATION: 97 % | WEIGHT: 196 LBS | SYSTOLIC BLOOD PRESSURE: 145 MMHG | BODY MASS INDEX: 31.4 KG/M2 | HEART RATE: 84 BPM | DIASTOLIC BLOOD PRESSURE: 60 MMHG

## 2022-07-26 DIAGNOSIS — I10 HYPERTENSION GOAL BP (BLOOD PRESSURE) < 130/80: Primary | ICD-10-CM

## 2022-07-26 PROCEDURE — 99207 PR NO CHARGE NURSE ONLY: CPT

## 2022-07-26 NOTE — PROGRESS NOTES
Miroslava Handy is being followed for Blood Pressure management.      BP Readings from Last 3 Encounters:   07/26/22 (!) 145/60   04/14/22 (!) 168/80   02/21/22 118/62       Wt Readings from Last 2 Encounters:   07/26/22 88.9 kg (196 lb)   04/14/22 90.7 kg (200 lb)       Is pulse 55 or greater? - Yes    Pulse Readings from Last 3 Encounters:   07/26/22 84   04/14/22 80   02/21/22 76       Current blood pressure medication(s):  Current Outpatient Medications   Medication Sig Dispense Refill     ASPIRIN 81 MG OR TABS ONE DAILY 100 3     betamethasone valerate (VALISONE) 0.1 % external ointment Apply topically 2 times daily 45 g 11     FLUoxetine (PROZAC) 20 MG capsule Take 1 capsule (20 mg) by mouth daily 90 capsule 3     losartan (COZAAR) 100 MG tablet Take 1 tablet (100 mg) by mouth daily 90 tablet 3     metoprolol succinate ER (TOPROL XL) 50 MG 24 hr tablet TAKE 1 TABLET BY MOUTH EVERY DAY 90 tablet 0     Omega-3 Fatty Acids (FISH OIL PO) Take 1,000 mg by mouth       potassium chloride ER (KLOR-CON) 20 MEQ CR tablet Take 1 tablet (20 mEq) by mouth daily 90 tablet 3     rosuvastatin (CRESTOR) 5 MG tablet Take 1 tablet (5 mg) by mouth daily 90 tablet 3     triamterene-HCTZ (DYAZIDE) 37.5-25 MG capsule TAKE 1 CAPSULE BY MOUTH EVERY DAY 90 capsule 3     vitamin D3 (CHOLECALCIFEROL) 2000 units (50 mcg) tablet Take 2 tablets (4,000 Units) by mouth daily            1. Follow up instructions include:     Next Provider visit: Follow up in 3 months..      SUBJECTIVE:                                                    The patient is taking medication as prescribed and is tolerating well.   Patient is not monitoring Blood Pressure at home. Pt reports has machine but is packed up right now during move.        Out of the following complicating factors: Cough, Headache, Lightheadedness, Shortness of breath, Fatigue, Nausea, Sexual Dysfunction, New onset of swelling or edema, Weakness and New onset of Chest Pain, the patient  reports:  None    OBJECTIVE:                                                      Today's BP completed using cuff size: large on right side arm.      Potassium   Date Value Ref Range Status   04/14/2022 4.5 3.4 - 5.3 mmol/L Final   01/25/2021 4.6 3.4 - 5.3 mmol/L Final     Creatinine   Date Value Ref Range Status   04/14/2022 1.41 (H) 0.52 - 1.04 mg/dL Final   01/25/2021 1.57 (H) 0.52 - 1.04 mg/dL Final     Urea Nitrogen   Date Value Ref Range Status   04/14/2022 24 7 - 30 mg/dL Final   01/25/2021 25 7 - 30 mg/dL Final     GFR Estimate   Date Value Ref Range Status   04/14/2022 40 (L) >60 mL/min/1.73m2 Final     Comment:     Effective December 21, 2021 eGFRcr in adults is calculated using the 2021 CKD-EPI creatinine equation which includes age and gender (Mary [employer] hope al., [place], DOI: 10.1056/JWZWpv9127756)   01/25/2021 33 (L) >60 mL/min/[1.73_m2] Final     Comment:     Non  GFR Calc  Starting 12/18/2018, serum creatinine based estimated GFR (eGFR) will be   calculated using the Chronic Kidney Disease Epidemiology Collaboration   (CKD-EPI) equation.           Education:  general discussion/verbal explanation  Ways to help improve BP/HTN:   Medications  Lose weight  Diet low in fat and rich in fruits, vegetables and low fat dairy products  Reduce salt in diet  Do something active for at least 30 minutes a day on most days of the week  Cut down on alcohol (if you drink more than 2 drinks per day)  Decrease stress (exercise, read, yoga, meditation, time for self, etc.)   Patient was given an opportunity to ask questions.    Patient verbalized understanding of this plan and is agreeable.    Neil Deshpande RN

## 2022-11-04 DIAGNOSIS — I10 HYPERTENSION GOAL BP (BLOOD PRESSURE) < 130/80: ICD-10-CM

## 2022-11-08 NOTE — TELEPHONE ENCOUNTER
Routing refill request to provider for review/approval because:  BP     BP Readings from Last 3 Encounters:   07/26/22 (!) 145/60   04/14/22 (!) 168/80   02/21/22 118/62      YOVANI TEAGUE RN on 11/8/2022 at 1:28 PM   [facility]

## 2022-11-09 RX ORDER — METOPROLOL SUCCINATE 50 MG/1
TABLET, EXTENDED RELEASE ORAL
Qty: 90 TABLET | Refills: 0 | Status: SHIPPED | OUTPATIENT
Start: 2022-11-09 | End: 2022-11-09

## 2022-11-09 RX ORDER — METOPROLOL SUCCINATE 50 MG/1
50 TABLET, EXTENDED RELEASE ORAL DAILY
Qty: 90 TABLET | Refills: 2 | Status: SHIPPED | OUTPATIENT
Start: 2022-11-09

## 2022-11-09 NOTE — TELEPHONE ENCOUNTER
The prescription(s) has been sent to the requested pharmacy.     sent    Last visit in this dept:    4/14/2022     Next visit in this dept:   Future Appointments 11/9/2022 - 5/8/2023    None          Health Maintenance   Topic Date Due     ZOSTER IMMUNIZATION (1 of 2) Never done     LUNG CANCER SCREENING  Never done     COLORECTAL CANCER SCREENING  06/15/2019     MAMMO SCREENING  06/21/2019     COVID-19 Vaccine (4 - Booster for Pfizer series) 06/09/2022     INFLUENZA VACCINE (1) 09/01/2022     ANNUAL REVIEW OF HM ORDERS  02/21/2023     MEDICARE ANNUAL WELLNESS VISIT  04/14/2023     BMP  04/14/2023     CMP  04/14/2023     LIPID  04/14/2023     MICROALBUMIN  04/14/2023     FALL RISK ASSESSMENT  04/14/2023     CBC  04/14/2023     HEMOGLOBIN  04/14/2023     ADVANCE CARE PLANNING  04/18/2027     DTAP/TDAP/TD IMMUNIZATION (2 - Td or Tdap) 06/14/2028     DEXA  09/14/2035     HEPATITIS C SCREENING  Completed     PHQ-2 (once per calendar year)  Completed     Pneumococcal Vaccine: 65+ Years  Completed     URINALYSIS  Completed     IPV IMMUNIZATION  Aged Out     MENINGITIS IMMUNIZATION  Aged Out

## 2023-01-03 NOTE — LETTER
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  I would recommend an office visit to review treatment options    Your test results are enclosed.      Please contact me if you have any questions.    In addition, here is a list of due or overdue Health Maintenance reminders.    Health Maintenance Due   Topic Date Due     Zoster (Shingles) Vaccine (1 of 2) 2002     Colorectal Cancer Screening  06/15/2019     Mammogram  2019     Flu Vaccine (1) 2020       Please call us at [phone] (or use MobileTag) to address the above recommendations.            Thank you very much for trusting [facility]..     Healthy regards,            Marcial Perdue M.D.        Results for orders placed or performed in visit on 20   DEXA HIP/PELVIS/SPINE - Future     Status: None    Narrative    BONE DENSITOMETRY  [facility] [address] [place] [address]  2020      PATIENT: Miroslava HARVEY Rozina  CHART: [id number]   :  1950  AGE:  70 year old  SEX:  female   REFERRING PROVIDER:  Rome Perdue MD     PROCEDURE:  Bone density scanning was performed using DXA technology of   the lumbar spine and hip.  Scanning was performed on a Lunar Prodigy   scanner.  Reporting is completed in the form of a T-score.  The T-score   represents the standard deviation from peak bone mass based on a young   healthy adult.     REFERENCE T-SCORES:       Normal                -1.0 and greater                                 Osteopenia         Between -1.0 and -2.5                                             Osteoporosis     -2.5 and less                                       RISK FACTORS:  Post-menopausal, Current tobacco  "use    CURRENT TREATMENT:  Calcium     FINDINGS:               Lumbar Spine (L1-L4)      T-score:  -0.4,  degenerative   changes present               Left Femoral Neck            T-score:  -2.5               Right Femoral Neck          T-score:  -2.3                 Lumbar (L1-L4) BMD: 1.143                 Total Hip Mean BMD: 0.758      IMPRESSION  Osteoporosis., Degenerative changes of the lumbar spine which may falsely   elevate results.    Recommendations include ensuring adequate Calcium and Vitamin D.    The current NOF Guidelines recommend treatment for patients with prior hip   or vertebral fracture, T-score -2.5 or below, or 10 year risk of any major   osteoporotic fracture >20% or 10 year risk of hip fracture >3%, as   calculated using the FRAX calculator (www.shef.ac.uk/FRAX or you can   google \"FRAX\").      Based on these guidelines, treatment (in addition to calcium and vitamin   D) is recommended for this patient, after ruling out other causes of   osteoporosis.  This is meant as an aid to clinical decision making; one must still use   clinical judgement.      Follow up can be considered in 2 years.   ___________________  Caryn Ramos M.D.  Electronically signed        " No risk alerts present

## 2023-04-22 ENCOUNTER — HEALTH MAINTENANCE LETTER (OUTPATIENT)
Age: 73
End: 2023-04-22

## 2023-06-02 ENCOUNTER — HEALTH MAINTENANCE LETTER (OUTPATIENT)
Age: 73
End: 2023-06-02

## 2024-06-29 ENCOUNTER — HEALTH MAINTENANCE LETTER (OUTPATIENT)
Age: 74
End: 2024-06-29

## 2024-10-30 ENCOUNTER — APPOINTMENT (OUTPATIENT)
Dept: RADIOLOGY | Facility: CLINIC | Age: 74
End: 2024-10-30
Attending: PHYSICIAN ASSISTANT
Payer: MEDICARE

## 2024-10-30 ENCOUNTER — HOSPITAL ENCOUNTER (EMERGENCY)
Facility: CLINIC | Age: 74
Discharge: HOME | End: 2024-10-30
Admitting: EMERGENCY MEDICINE
Payer: MEDICARE

## 2024-10-30 VITALS
HEART RATE: 98 BPM | OXYGEN SATURATION: 97 % | SYSTOLIC BLOOD PRESSURE: 198 MMHG | DIASTOLIC BLOOD PRESSURE: 86 MMHG | RESPIRATION RATE: 18 BRPM | TEMPERATURE: 97.7 F

## 2024-10-30 DIAGNOSIS — I10 HYPERTENSION: ICD-10-CM

## 2024-10-30 DIAGNOSIS — I10 HYPERTENSION GOAL BP (BLOOD PRESSURE) < 130/80: ICD-10-CM

## 2024-10-30 DIAGNOSIS — Z91.148 NON COMPLIANCE W MEDICATION REGIMEN: ICD-10-CM

## 2024-10-30 DIAGNOSIS — M25.561 ACUTE PAIN OF RIGHT KNEE: ICD-10-CM

## 2024-10-30 PROCEDURE — 73562 X-RAY EXAM OF KNEE 3: CPT | Mod: RT

## 2024-10-30 PROCEDURE — 250N000013 HC RX MED GY IP 250 OP 250 PS 637: Performed by: PHYSICIAN ASSISTANT

## 2024-10-30 PROCEDURE — 99284 EMERGENCY DEPT VISIT MOD MDM: CPT | Mod: 25

## 2024-10-30 RX ORDER — METOPROLOL SUCCINATE 50 MG/1
50 TABLET, EXTENDED RELEASE ORAL DAILY
Qty: 14 TABLET | Refills: 0 | Status: SHIPPED | OUTPATIENT
Start: 2024-10-30 | End: 2024-11-13

## 2024-10-30 RX ORDER — ACETAMINOPHEN 325 MG/1
975 TABLET ORAL ONCE
Status: COMPLETED | OUTPATIENT
Start: 2024-10-30 | End: 2024-10-30

## 2024-10-30 RX ORDER — METOPROLOL TARTRATE 25 MG/1
50 TABLET, FILM COATED ORAL ONCE
Status: COMPLETED | OUTPATIENT
Start: 2024-10-30 | End: 2024-10-30

## 2024-10-30 RX ORDER — TRIAMTERENE AND HYDROCHLOROTHIAZIDE 37.5; 25 MG/1; MG/1
CAPSULE ORAL
Qty: 14 CAPSULE | Refills: 0 | Status: SHIPPED | OUTPATIENT
Start: 2024-10-30

## 2024-10-30 RX ORDER — LOSARTAN POTASSIUM 100 MG/1
100 TABLET ORAL DAILY
Qty: 14 TABLET | Refills: 0 | Status: SHIPPED | OUTPATIENT
Start: 2024-10-30 | End: 2024-11-13

## 2024-10-30 RX ORDER — POTASSIUM CHLORIDE 1500 MG/1
20 TABLET, EXTENDED RELEASE ORAL DAILY
Qty: 14 TABLET | Refills: 0 | Status: SHIPPED | OUTPATIENT
Start: 2024-10-30 | End: 2024-11-13

## 2024-10-30 RX ORDER — OXYCODONE HYDROCHLORIDE 5 MG/1
5 TABLET ORAL ONCE
Status: COMPLETED | OUTPATIENT
Start: 2024-10-30 | End: 2024-10-30

## 2024-10-30 RX ADMIN — ACETAMINOPHEN 975 MG: 325 TABLET ORAL at 17:22

## 2024-10-30 RX ADMIN — OXYCODONE 5 MG: 5 TABLET ORAL at 17:22

## 2024-10-30 RX ADMIN — METOPROLOL TARTRATE 50 MG: 25 TABLET, FILM COATED ORAL at 17:22

## 2024-10-30 ASSESSMENT — COLUMBIA-SUICIDE SEVERITY RATING SCALE - C-SSRS
6. HAVE YOU EVER DONE ANYTHING, STARTED TO DO ANYTHING, OR PREPARED TO DO ANYTHING TO END YOUR LIFE?: NO
2. HAVE YOU ACTUALLY HAD ANY THOUGHTS OF KILLING YOURSELF IN THE PAST MONTH?: NO
1. IN THE PAST MONTH, HAVE YOU WISHED YOU WERE DEAD OR WISHED YOU COULD GO TO SLEEP AND NOT WAKE UP?: NO

## 2024-10-30 ASSESSMENT — ACTIVITIES OF DAILY LIVING (ADL): ADLS_ACUITY_SCORE: 0

## 2024-10-30 NOTE — ED TRIAGE NOTES
Pt presents to the ED with c/o right knee pain d/t a fall that occurred on Friday. Pt states she got her lanyard stuck in a gate, fell and twisted knee. Pt has been able to ambulate, reports that it is painful. Hypertensive in triage, is on BP meds.      Triage Assessment (Adult)       Row Name 10/30/24 1600          Triage Assessment    Airway WDL WDL        Respiratory WDL    Respiratory WDL WDL        Skin Circulation/Temperature WDL    Skin Circulation/Temperature WDL WDL        Cardiac WDL    Cardiac WDL WDL     Cardiac Rhythm NSR        Peripheral/Neurovascular WDL    Peripheral Neurovascular WDL WDL        Cognitive/Neuro/Behavioral WDL    Cognitive/Neuro/Behavioral WDL WDL

## 2024-10-30 NOTE — ED PROVIDER NOTES
Emergency Department Encounter   NAME: Miroslava Handy ; AGE: 74 year old female ; YOB: 1950 ; MRN: [id number] ; PCP: No Ref-Primary, Physician   ED PROVIDER: Linda Barboza PA-C    Evaluation Date & Time:   No admission date for patient encounter.    CHIEF COMPLAINT:  Knee Pain      Impression and Plan   MDM: Miroslava Handy is a 74 year old female who presents to the ED for evaluation of knee pain.  The patient presents to the emergency department for evaluation of a knee injury that occurred 5 days ago, when she twisted the knee and fell on it bent.  Denies hitting her head, LOC, or other injuries associated with the fall.  She has been ambulatory on the leg since the fall though pain, stiffness, clicking and difficulty going up and down stairs has worsened.  Here in the ED, she is generally well-appearing, very pleasant, and in no acute distress.  She has tenderness and moderate soft tissue swelling along the medial knee joint with a positive Lachman's.  X-ray of the knee was obtained and personally reviewed and interpreted with no evidence of fracture.  No subluxation, dislocation or large joint effusion.  She does incidentally have moderate medial compartment narrowing.  Based on her description of symptoms and exam, more concerning for ligamentous versus meniscal injury.  Will place her in a knee immobilizer with crutches and refer to [facility] for likely MRI of the knee this week to further assess.  We discussed supportive measures for home and over-the-counter pain control.    Of note, upon arrival to the emergency department, her blood pressure was 228/100 with a repeat blood pressure of 233/101.  Patient has a known history of hypertension though her blood pressure today is extremely elevated.  I had a long discussion with her, she is currently in between primary care providers/clinics.  Due to this she has been taking medication holidays, and took herself off of all 4 of her  antihypertensives for the past week though did take a dose this morning. She is completely asymptomatic and has not had any concerning symptoms at home - I.e. no headache, visual changes, confusion, chest pain, shortness of breath. I suspect her elevated BP is due in combination to her poor medication compliance and exacerbated by pain.  Patient given a dose of metoprolol as well as oxycodone and Tylenol for pain relief.  Repeat blood pressure is downtrending to 198/86.  At this time, as she is asymptomatic and blood pressure has improved with 1 dose of p.o. medication, do feel that she is appropriate for discharge to home as she is not showing any signs of endorgan dysfunction or hypertensive emergency.  We did though have a long discussion about the importance of taking her medications as prescribed, closely monitoring her blood pressure and establishing a new PCP.  Referral to primary care placed as well as follow-up information for [place] [facility].  A refill of her blood pressure medications for the next 2 weeks until she is able to establish an appointment.  We reviewed concerning symptoms to monitor for at length and she verbalized understanding will return to the ED if any of these develop.      *Patient examination and workup was initiated in triage due to inpatient hospital and Emergency Department bed shortage resulting in long waiting room wait times. Patient and/or guardian's consent was obtained.          Medical Decision Making  Obtained supplemental history:Supplemental history obtained?: Yes - daughter   Reviewed external records: External records reviewed?: Medication refill on 11/4/2022  Care impacted by chronic illness: HTN, elevated BMI   Did you consider but not order tests?: Work up considered but not performed and documented in chart, if applicable  Did you interpret images independently?: Independent interpretation of ECG and images noted in documentation, when applicable.  Consultation  discussion with other provider:Did you involve another provider (consultant, MH, pharmacy, etc.)?: No  Discharge. I prescribed additional prescription strength medication(s) as charted. See documentation for any additional details.    MIPS: Not Applicable      ED COURSE:  4:20 PM I met and introduced myself to the patient. I gathered initial history and performed my physical exam. We discussed plan for initial workup.   4:45 PM I discussed the case with Dr. Beck, ED MD.   6:30 PM I rechecked the patient and discussed results, discharge, follow up, and reasons to return to the ED.     At the conclusion of the encounter I discussed the results of all the tests and the disposition. The questions were answered. The patient or family acknowledged understanding and was agreeable with the care plan.    FINAL IMPRESSION:    ICD-10-CM    1. Acute pain of right knee  M25.561 Primary Care Referral      2. Hypertension  I10 Primary Care Referral      3. Non compliance w medication regimen  Z91.148 Primary Care Referral      4. Hypertension goal BP (blood pressure) < 130/80  I10 triamterene-HCTZ (DYAZIDE) 37.5-25 MG capsule     potassium chloride henrry ER (KLOR-CON M20) 20 MEQ CR tablet     metoprolol succinate ER (TOPROL XL) 50 MG 24 hr tablet     losartan (COZAAR) 100 MG tablet            MEDICATIONS GIVEN IN THE EMERGENCY DEPARTMENT:  Medications   metoprolol tartrate (LOPRESSOR) tablet 50 mg (50 mg Oral $Given 10/30/24 1722)   acetaminophen (TYLENOL) tablet 975 mg (975 mg Oral $Given 10/30/24 1722)   oxyCODONE (ROXICODONE) tablet 5 mg (5 mg Oral $Given 10/30/24 1722)         NEW PRESCRIPTIONS STARTED AT TODAY'S ED VISIT:  Discharge Medication List as of 10/30/2024  7:01 PM            HPI   Use of Intrepreter: N/A     Miroslava HARVEY Rozina is a 74 year old female with a pertinent history of hypertension who presents to the ED by walk-in for evaluation of right knee pain.     Patient reports that on 10/25/2024 she got her lanyard  and bag stuck in a gate and fell twisting her right leg. She thought she would be ok but her right knee pain has been persisting and she now has shooting pain down her right leg. She is ambulatory but using the stairs is painful for her. Patient mentions that she has not been taking her blood pressure medications (metoprolol, amlodipine, and losartan) since her fall. She did take them this morning, which is when she normally takes them. She is also taking baby aspirin. Denies numbness and tingling of the legs.       REVIEW OF SYSTEMS:  Pertinent positive and negative symptoms per HPI.       Medical History     Past Medical History:   Diagnosis Date    Hypertension     Tobacco use disorder - quit 20       Past Surgical History:   Procedure Laterality Date    HC DILATION/CURETTAGE DIAG/THER NON OB      x2    ZZC EXPLORATORY OF ABDOMEN      infertility related       Family History   Problem Relation Age of Onset    Hypertension Father     Cerebrovascular Disease Father     Coronary Artery Disease Father     Diabetes Mother     Hypertension Mother     Leukemia Mother     No Known Problems Brother     No Known Problems Sister     No Known Problems Daughter     No Known Problems Daughter     No Known Problems Sister     No Known Problems Sister     No Known Problems Sister     Breast Cancer No family hx of     Colon Cancer No family hx of        Social History     Tobacco Use    Smoking status: Former     Current packs/day: 0.00     Average packs/day: 1 pack/day for 31.0 years (31.0 ttl pk-yrs)     Types: Cigarettes     Start date: 1987     Quit date: 2018     Years since quittin.3    Smokeless tobacco: Never   Substance Use Topics    Alcohol use: Yes     Comment: 1 glass of wine per month    Drug use: No       losartan (COZAAR) 100 MG tablet  metoprolol succinate ER (TOPROL XL) 50 MG 24 hr tablet  potassium chloride henrry ER (KLOR-CON M20) 20 MEQ CR tablet  triamterene-HCTZ (DYAZIDE)  37.5-25 MG capsule  ASPIRIN 81 MG OR TABS  betamethasone valerate (VALISONE) 0.1 % external ointment  FLUoxetine (PROZAC) 20 MG capsule  Omega-3 Fatty Acids (FISH OIL PO)  rosuvastatin (CRESTOR) 5 MG tablet  vitamin D3 (CHOLECALCIFEROL) 2000 units (50 mcg) tablet          Physical Exam     First Vitals:  Patient Vitals for the past 24 hrs:   BP Temp Temp src Pulse Resp SpO2   10/30/24 1815 (!) 198/86 -- -- -- -- --   10/30/24 1647 (!) 233/101 -- -- -- -- --   10/30/24 1602 (!) 228/100 -- -- -- -- --   10/30/24 1558 -- 97.7  F (36.5  C) Temporal 98 18 97 %         PHYSICAL EXAM:   Physical Exam  Vitals reviewed.   Constitutional:       General: She is not in acute distress.     Appearance: She is not ill-appearing or toxic-appearing.   HENT:      Head: Normocephalic and atraumatic.   Pulmonary:      Effort: Pulmonary effort is normal.   Musculoskeletal:      Comments: Moderate soft tissue swelling and tenderness to medial knee joint. No erythema or warmth. No palpable effusion. Able to actively flex and extend at the knee joint.  Active hip flexion intact.  Dorsiflexion and plantarflexion intact.  2+ DP and PT pulses extremity is warm and well-perfused with distal cap refill less than 2 seconds.  No tenderness to thigh, calf, ankle or foot.  No popliteal masses.  Positive Lachman's.  Negative Jak's.   Neurological:      Mental Status: She is alert.             Results     LAB:  All pertinent labs reviewed and interpreted  Labs Ordered and Resulted from Time of ED Arrival to Time of ED Departure - No data to display    RADIOLOGY:  XR Knee Right 3 Views   Final Result   IMPRESSION: No fracture or effusion. Moderate medial compartment narrowing.          I, Ursula Edwards, am serving as a scribe to document services personally performed by Linda Barboza PA-C, based on my observation and the provider's statements to me. ILinda PA-C attest that Ursula Edwards is acting in a scribe capacity, has observed my  performance of the services and has documented them in accordance with my direction.       Linda Barboza PA-C   Emergency Medicine   [facility] EMERGENCY ROOM      Linda Barboza PA-C  10/30/24 2000

## 2024-10-30 NOTE — DISCHARGE INSTRUCTIONS
As we discussed, please call [facility] tomorrow to schedule follow-up for your right knee.  You will likely need an MRI to further evaluate the knee.  Please wear the knee immobilizer when up and moving around in the meantime.    Your blood pressure was extremely high today.  It is very important that you are not missing any doses of your blood pressure medications that are taking them correctly.  I have provided a 2-week refill but you need to establish a new primary care provider who can refill your annual prescriptions.  I placed referral to primary care and provided information for the [facility] above.    If it anytime you develop headache, confusion, visual or speech changes, vomiting, arm or leg weakness or numbness, chest pain or shortness of breath, worsening pain in the knee, knee redness, warmth or swelling please return to the ER for further evaluation.

## 2025-05-09 ENCOUNTER — HOSPITAL ENCOUNTER (EMERGENCY)
Facility: CLINIC | Age: 75
Discharge: HOME | End: 2025-05-09
Attending: EMERGENCY MEDICINE | Admitting: EMERGENCY MEDICINE
Payer: COMMERCIAL

## 2025-05-09 ENCOUNTER — APPOINTMENT (OUTPATIENT)
Dept: ULTRASOUND IMAGING | Facility: CLINIC | Age: 75
End: 2025-05-09
Attending: INTERNAL MEDICINE
Payer: COMMERCIAL

## 2025-05-09 VITALS
OXYGEN SATURATION: 98 % | SYSTOLIC BLOOD PRESSURE: 170 MMHG | BODY MASS INDEX: 30.37 KG/M2 | HEIGHT: 66 IN | WEIGHT: 189 LBS | TEMPERATURE: 98.2 F | DIASTOLIC BLOOD PRESSURE: 84 MMHG | HEART RATE: 81 BPM | RESPIRATION RATE: 20 BRPM

## 2025-05-09 DIAGNOSIS — N30.00 ACUTE CYSTITIS WITHOUT HEMATURIA: ICD-10-CM

## 2025-05-09 DIAGNOSIS — N17.9 ACUTE KIDNEY INJURY SUPERIMPOSED ON CKD: ICD-10-CM

## 2025-05-09 DIAGNOSIS — N18.9 ACUTE KIDNEY INJURY SUPERIMPOSED ON CKD: ICD-10-CM

## 2025-05-09 DIAGNOSIS — E87.5 HYPERKALEMIA: ICD-10-CM

## 2025-05-09 DIAGNOSIS — I10 HYPERTENSION: ICD-10-CM

## 2025-05-09 LAB
ALBUMIN MFR UR ELPH: 8.2 MG/DL
ALBUMIN UR-MCNC: NEGATIVE MG/DL
ANION GAP SERPL CALCULATED.3IONS-SCNC: 16 MMOL/L (ref 7–15)
APPEARANCE UR: ABNORMAL
ATRIAL RATE - MUSE: 71 BPM
BACTERIA #/AREA URNS HPF: ABNORMAL /HPF
BILIRUB UR QL STRIP: NEGATIVE
BUN SERPL-MCNC: 47 MG/DL (ref 8–23)
CALCIUM SERPL-MCNC: 9.9 MG/DL (ref 8.8–10.4)
CHLORIDE SERPL-SCNC: 104 MMOL/L (ref 98–107)
CHLORIDE UR-SCNC: 59 MMOL/L
COLOR UR AUTO: ABNORMAL
CREAT SERPL-MCNC: 2.21 MG/DL (ref 0.51–0.95)
CREAT UR-MCNC: 117 MG/DL
CREAT UR-MCNC: 119 MG/DL
DIASTOLIC BLOOD PRESSURE - MUSE: NORMAL MMHG
EGFRCR SERPLBLD CKD-EPI 2021: 23 ML/MIN/1.73M2
ERYTHROCYTE [DISTWIDTH] IN BLOOD BY AUTOMATED COUNT: 12.5 % (ref 10–15)
GLUCOSE SERPL-MCNC: 106 MG/DL (ref 70–99)
GLUCOSE UR STRIP-MCNC: NEGATIVE MG/DL
HCO3 SERPL-SCNC: 18 MMOL/L (ref 22–29)
HCT VFR BLD AUTO: 34.2 % (ref 35–47)
HGB BLD-MCNC: 11.4 G/DL (ref 11.7–15.7)
HGB UR QL STRIP: NEGATIVE
HOLD SPECIMEN: NORMAL
INTERPRETATION ECG - MUSE: NORMAL
KETONES UR STRIP-MCNC: NEGATIVE MG/DL
LEUKOCYTE ESTERASE UR QL STRIP: ABNORMAL
MCH RBC QN AUTO: 30.6 PG (ref 26.5–33)
MCHC RBC AUTO-ENTMCNC: 33.3 G/DL (ref 31.5–36.5)
MCV RBC AUTO: 92 FL (ref 78–100)
MICROALBUMIN UR-MCNC: 34 MG/L
MICROALBUMIN/CREAT UR: 29.06 MG/G CR (ref 0–25)
NITRATE UR QL: NEGATIVE
P AXIS - MUSE: 56 DEGREES
PH UR STRIP: 5.5 [PH] (ref 5–7)
PLATELET # BLD AUTO: 282 10E3/UL (ref 150–450)
POTASSIUM SERPL-SCNC: 5.4 MMOL/L (ref 3.4–5.3)
POTASSIUM UR-SCNC: 19.9 MMOL/L
PR INTERVAL - MUSE: 166 MS
PROT/CREAT 24H UR: 0.07 MG/MG CR (ref 0–0.2)
QRS DURATION - MUSE: 82 MS
QT - MUSE: 392 MS
QTC - MUSE: 425 MS
R AXIS - MUSE: 20 DEGREES
RBC # BLD AUTO: 3.72 10E6/UL (ref 3.8–5.2)
RBC URINE: 2 /HPF
SODIUM SERPL-SCNC: 138 MMOL/L (ref 135–145)
SODIUM UR-SCNC: 66 MMOL/L
SP GR UR STRIP: 1.01 (ref 1–1.03)
SQUAMOUS EPITHELIAL: 5 /HPF
SYSTOLIC BLOOD PRESSURE - MUSE: NORMAL MMHG
T AXIS - MUSE: 44 DEGREES
UROBILINOGEN UR STRIP-MCNC: NORMAL MG/DL
VENTRICULAR RATE- MUSE: 71 BPM
WBC # BLD AUTO: 9 10E3/UL (ref 4–11)
WBC URINE: 26 /HPF

## 2025-05-09 PROCEDURE — 81001 URINALYSIS AUTO W/SCOPE: CPT | Performed by: PHYSICIAN ASSISTANT

## 2025-05-09 PROCEDURE — 84300 ASSAY OF URINE SODIUM: CPT | Performed by: INTERNAL MEDICINE

## 2025-05-09 PROCEDURE — 82436 ASSAY OF URINE CHLORIDE: CPT | Performed by: INTERNAL MEDICINE

## 2025-05-09 PROCEDURE — 84156 ASSAY OF PROTEIN URINE: CPT

## 2025-05-09 PROCEDURE — 80048 BASIC METABOLIC PNL TOTAL CA: CPT | Performed by: PHYSICIAN ASSISTANT

## 2025-05-09 PROCEDURE — 96361 HYDRATE IV INFUSION ADD-ON: CPT

## 2025-05-09 PROCEDURE — 80048 BASIC METABOLIC PNL TOTAL CA: CPT | Performed by: EMERGENCY MEDICINE

## 2025-05-09 PROCEDURE — 84133 ASSAY OF URINE POTASSIUM: CPT | Performed by: INTERNAL MEDICINE

## 2025-05-09 PROCEDURE — 82043 UR ALBUMIN QUANTITATIVE: CPT | Performed by: INTERNAL MEDICINE

## 2025-05-09 PROCEDURE — 99285 EMERGENCY DEPT VISIT HI MDM: CPT | Mod: 25

## 2025-05-09 PROCEDURE — 93005 ELECTROCARDIOGRAM TRACING: CPT | Performed by: PHYSICIAN ASSISTANT

## 2025-05-09 PROCEDURE — 250N000013 HC RX MED GY IP 250 OP 250 PS 637

## 2025-05-09 PROCEDURE — 85027 COMPLETE CBC AUTOMATED: CPT | Performed by: PHYSICIAN ASSISTANT

## 2025-05-09 PROCEDURE — 36415 COLL VENOUS BLD VENIPUNCTURE: CPT | Performed by: EMERGENCY MEDICINE

## 2025-05-09 PROCEDURE — 258N000003 HC RX IP 258 OP 636: Performed by: PHYSICIAN ASSISTANT

## 2025-05-09 PROCEDURE — 96360 HYDRATION IV INFUSION INIT: CPT

## 2025-05-09 PROCEDURE — 85027 COMPLETE CBC AUTOMATED: CPT | Performed by: EMERGENCY MEDICINE

## 2025-05-09 PROCEDURE — 76770 US EXAM ABDO BACK WALL COMP: CPT

## 2025-05-09 PROCEDURE — 99204 OFFICE O/P NEW MOD 45 MIN: CPT

## 2025-05-09 PROCEDURE — 87186 SC STD MICRODIL/AGAR DIL: CPT | Performed by: PHYSICIAN ASSISTANT

## 2025-05-09 RX ORDER — SODIUM BICARBONATE 650 MG/1
1300 TABLET ORAL ONCE
Status: DISCONTINUED | OUTPATIENT
Start: 2025-05-09 | End: 2025-05-09

## 2025-05-09 RX ORDER — HYDRALAZINE HYDROCHLORIDE 10 MG/1
10 TABLET, FILM COATED ORAL 2 TIMES DAILY PRN
Status: DISCONTINUED | OUTPATIENT
Start: 2025-05-09 | End: 2025-05-09 | Stop reason: HOSPADM

## 2025-05-09 RX ORDER — SODIUM BICARBONATE 650 MG/1
1300 TABLET ORAL DAILY
Status: DISCONTINUED | OUTPATIENT
Start: 2025-05-09 | End: 2025-05-09 | Stop reason: HOSPADM

## 2025-05-09 RX ORDER — CEPHALEXIN 500 MG/1
500 CAPSULE ORAL 2 TIMES DAILY
Qty: 10 CAPSULE | Refills: 0 | Status: SHIPPED | OUTPATIENT
Start: 2025-05-09 | End: 2025-05-14

## 2025-05-09 RX ADMIN — SODIUM BICARBONATE 1300 MG: 650 TABLET ORAL at 16:11

## 2025-05-09 RX ADMIN — SODIUM CHLORIDE 1000 ML: 0.9 INJECTION, SOLUTION INTRAVENOUS at 13:24

## 2025-05-09 ASSESSMENT — COLUMBIA-SUICIDE SEVERITY RATING SCALE - C-SSRS
2. HAVE YOU ACTUALLY HAD ANY THOUGHTS OF KILLING YOURSELF IN THE PAST MONTH?: NO
1. IN THE PAST MONTH, HAVE YOU WISHED YOU WERE DEAD OR WISHED YOU COULD GO TO SLEEP AND NOT WAKE UP?: NO
6. HAVE YOU EVER DONE ANYTHING, STARTED TO DO ANYTHING, OR PREPARED TO DO ANYTHING TO END YOUR LIFE?: NO

## 2025-05-09 ASSESSMENT — ACTIVITIES OF DAILY LIVING (ADL): ADLS_ACUITY_SCORE: 41

## 2025-05-09 NOTE — DISCHARGE INSTRUCTIONS
Kidney function is stable compared to the past few days.  This is ultimately elevated from your baseline.  You were given fluids in the emergency department to help hydrate your kidneys.  Stop taking your triamterene-hydrochlorothiazide as this is likely causing some of your kidney injury.  You can continue to take your other blood pressure medicines as previously prescribed.    Make sure you are pushing fluids at home.  Continue with a low potassium diet.  Call your clinic on Monday to schedule a follow-up to have your BMP (potassium and kidney function) rechecked.  Nephrology will plan to see you in 2 to 3 weeks for recheck.  I have placed the referral and they have reviewed your information here today.    Your urine was also concerning for infection.  Start the antibiotics as prescribed.  Your urine will be sent for culture and we will call you if antibiotics need to be changed.    Return to the emergency department if you develop fevers, abdominal pain, back pain, urinary symptoms or difficulty urinating, or any other concerning symptoms.  We would be happy to see you.

## 2025-05-09 NOTE — ED TRIAGE NOTES
Patient sent here from clinic due to abnormal lab values- potassium 5.5, creatinine 2.5. Patient reports recent flank discomfort. She has frequent urination.  No pain now. Otherwise no acute symptoms.      Triage Assessment (Adult)       Row Name 05/09/25 1222          Triage Assessment    Airway WDL WDL        Respiratory WDL    Respiratory WDL X;cough     Cough Frequency infrequent     Cough Type dry        Skin Circulation/Temperature WDL    Skin Circulation/Temperature WDL X  plaque psoriasis.        Cardiac WDL    Cardiac WDL WDL        Peripheral/Neurovascular WDL    Peripheral Neurovascular WDL WDL        Cognitive/Neuro/Behavioral WDL    Cognitive/Neuro/Behavioral WDL WDL

## 2025-05-09 NOTE — ED PROVIDER NOTES
EMERGENCY DEPARTMENT ENCOUNTER      NAME: Miroslava Handy  AGE: 75 year old female  YOB: 1950  MRN: [id number]  EVALUATION DATE & TIME: No admission date for patient encounter.    PCP: Andie Whelan    ED PROVIDER: Karen Colunga PA-C      Chief Complaint   Patient presents with    Abnormal Labs         FINAL IMPRESSION:  1. Acute kidney injury superimposed on CKD    2. Hyperkalemia    3. Hypertension    4. Acute cystitis without hematuria          ED COURSE & MEDICAL DECISION MAKIN:00 PM I introduced myself to patient, performed initial HPI and examination.   1:28 PM Paged to nephrology. Confirmed with patient that she is taking all 4 antihypertensive medicines.   2:28 PM Reviewed consult note from nephology.   2:35 PM Repaged to nephrology.   2:54 PM Spoke with nephrologist, attempted to bring to introduce patient. Patient at ultrasound.   3:08 PM Spoke with nephrology, feels comfortable with patient discharging. Plan to discontinue triamterene-hydrochlorothiazide. Recheck BMP in 1 week in clinic. Nephrology follow up in 2-3 weeks.   3:45 PM Discussed results and plan for discharge.     75 year old female with PMH HTN, psoriasis, CKD, anxiety presents to the Emergency Department for evaluation of abnormal labs.    Seen in clinic for routine testing on .  Potassium and creatinine were elevated at that time.  Patient has subsequently been seen in urgent care twice for recheck with persistently elevated creatinine and hyperkalemia.  Patient is on antihypertensives including: triamterene-hydrochlorothiazide 37.5-25 mg, losartan 100 mg, metoprolol 50 mg, and amlodipine 10 mg. Also on Rosuvastatin 10 mg.     Potassium has been 5.6, 5.5 and 5.4 since testing .  Creatinine has been 1.98, 2.29, and 2.5 since . History of CKD, but creatinine  was 1.27 (patient's baseline over past 1 year 1.87-1.41).    Are repeated in the emergency department today.  No leukocytosis.  Stable  hemoglobin.  Calcium remains mildly elevated at 5.44.  No EKG changes associated with this.  Creatinine is 2.21. BUN is also elevated, patient on a diuretic and question if a component of this is dehydration. Given IV fluids in the emergency department.     Do ultimately suspect that patient's elevation in her creatinine is related to her most recent antihypertensive, triamterene-hydrochlorothiazide 37.5-25 mg, which was added 2/24/25.    Consulted with nephrology, who has placed recommendations on additional testing.  Ultrasound with no acute findings.  Considered admission, but with overall stability and after consultation with nephrology, plan to discharge with close follow-up.  Will discontinue her triamterene hydrochlorothiazide.  Encouraged continued hydration and low potassium diet.  Discussed plan to have BMP rechecked in clinic next week and follow-up outpatient with nephrology in 2 to 3 weeks.  Discussed that urine is concerning for infection, culture pending but will start on antibiotics in the meantime.  Started on Keflex pending cultures.  Instructed on red flag/indications to return to the emergency department.  All questions answered to the best my ability and patient is agreeable with plan.    Medical Decision Making      Discharge. I prescribed additional prescription strength medication(s) as charted. See documentation for any additional details.    MIPS (CTPE, Dental pain, Rogel, Sinusitis, Asthma/COPD, Head Trauma): Not Applicable    SEPSIS: None            MEDICATIONS GIVEN IN THE EMERGENCY:  Medications   hydrALAZINE (APRESOLINE) tablet 10 mg (has no administration in time range)   sodium bicarbonate tablet 1,300 mg (has no administration in time range)   sodium chloride 0.9% BOLUS 1,000 mL (1,000 mLs Intravenous $New Bag 5/9/25 5380)       NEW PRESCRIPTIONS STARTED AT TODAY'S ER VISIT  New Prescriptions    CEPHALEXIN (KEFLEX) 500 MG CAPSULE    Take 1 capsule (500 mg) by mouth 2 times daily for  5 days.          =================================================================    HPI    Patient information was obtained from: Patient    Use of : N/A         Miroslava Handy is a 75 year old female with a pertinent history of HTN, psoriasis, CKD, anxiety and depression who presents to this ED for evaluation of abnormal labs, urinary symptoms.    Patient sent from urgent care for abnormal labs.  Patient does report some bilateral flank pain that has been ongoing which she attributes to sleeping with her cats in the bed and trying to avoid rolling over them.  No new urinary symptoms.  No nausea or vomiting.  No belly pain.    Patient was seen in urgent care today to recheck sodium and potassium, decreased kidney function 3 weeks ago with unchanged symptoms.  Low sodium, low potassium diet for the past 7 days.  On multiple antihypertensives (Cozaar, triamterene hydrochlorothiazide) and recent increase in statin. Labs today notable for: Creatinine 2.5 (GFR 20) and Potassium 5.4. Sent to ED for further evaluation.   BMP 4/28 with potassium 5.5, creatinine 2.29.   BMP 4/24 with potassium 5.6, creatinine 1.98.   Was seen in clinic on 4/24 for discussion of Cologuard testing (positive) and routine kidney testing. No noted symptoms at that time.     REVIEW OF SYSTEMS   ROS negative unless otherwise stated in HPI    PAST MEDICAL HISTORY:  Past Medical History:   Diagnosis Date    Hypertension     Tobacco use disorder - quit 6/12/18 8/25/2008       PAST SURGICAL HISTORY:  Past Surgical History:   Procedure Laterality Date    HC DILATION/CURETTAGE DIAG/THER NON OB      x2    ZZC EXPLORATORY OF ABDOMEN  1977    infertility related       CURRENT MEDICATIONS:    cephALEXin (KEFLEX) 500 MG capsule  ASPIRIN 81 MG OR TABS  betamethasone valerate (VALISONE) 0.1 % external ointment  FLUoxetine (PROZAC) 20 MG capsule  losartan (COZAAR) 100 MG tablet  metoprolol succinate ER (TOPROL XL) 50 MG 24 hr tablet  Omega-3 Fatty  "Acids (FISH OIL PO)  rosuvastatin (CRESTOR) 5 MG tablet  vitamin D3 (CHOLECALCIFEROL) 2000 units (50 mcg) tablet        ALLERGIES:  Allergies   Allergen Reactions    Atorvastatin      Neck and calf spasms.     Codeine Nausea and Vomiting and Other (See Comments)    Lisinopril Nausea       FAMILY HISTORY:  Family History   Problem Relation Age of Onset    Hypertension Father     Cerebrovascular Disease Father     Coronary Artery Disease Father     Diabetes Mother     Hypertension Mother     Leukemia Mother     No Known Problems Brother     No Known Problems Sister     No Known Problems Daughter     No Known Problems Daughter     No Known Problems Sister     No Known Problems Sister     No Known Problems Sister     Breast Cancer No family hx of     Colon Cancer No family hx of        SOCIAL HISTORY:   Social History     Socioeconomic History    Marital status:     Number of children: 2   Occupational History    Occupation:    Tobacco Use    Smoking status: Former     Current packs/day: 0.00     Average packs/day: 1 pack/day for 31.0 years (31.0 ttl pk-yrs)     Types: Cigarettes     Start date: 1987     Quit date: 2018     Years since quittin.9    Smokeless tobacco: Never   Substance and Sexual Activity    Alcohol use: Yes     Comment: 1 glass of wine per month    Drug use: No    Sexual activity: Not Currently       VITALS:  BP (!) 148/72   Pulse 84   Temp 98.2  F (36.8  C) (Temporal)   Resp 20   Ht 1.664 m (5' 5.5\")   Wt 85.7 kg (189 lb)   SpO2 98%   BMI 30.97 kg/m      PHYSICAL EXAM    Constitutional: Well developed, Well nourished, NAD, GCS 15   HENT: Normocephalic, Atraumatic  Neck- Supple, Nontender. Normal ROM.  Eyes: Conjunctiva normal.   Respiratory: No respiratory distress, speaking in full sentences. Normal breath sounds, No wheezing  Cardiovascular: Normal heart rate, Regular rhythm, No murmurs.   GI: Soft, nontender. No distention or masses. No CVA tenderness. "   Musculoskeletal: No deformities, Moves all extremities equally.   Integument: Warm, Dry, No erythema, ecchymosis, or rash.  Neurologic: Alert & oriented x 3, Normal sensory function. No focal deficits.   Psychiatric: Affect normal, Judgment normal, Mood normal. Cooperative.      LAB:  All pertinent labs reviewed and interpreted.  Results for orders placed or performed during the hospital encounter of 05/09/25   US Renal Complete w Arterial Duplex    Impression    IMPRESSION:   1.  No evidence of a hemodynamically significant renal artery stenosis.  2.  Increased echogenicity of the renal bregma bilaterally suggestive of medical renal disease.   Extra Red Top Tube   Result Value Ref Range    Hold Specimen JIC    Extra Green Top (Lithium Heparin) Tube   Result Value Ref Range    Hold Specimen     Extra Purple Top Tube   Result Value Ref Range    Hold Specimen     UA with Microscopic reflex to Culture    Specimen: Urine, Midstream   Result Value Ref Range    Color Urine Light Yellow Colorless, Straw, Light Yellow, Yellow    Appearance Urine Turbid (A) Clear    Glucose Urine Negative Negative mg/dL    Bilirubin Urine Negative Negative    Ketones Urine Negative Negative mg/dL    Specific Gravity Urine 1.009 1.001 - 1.030    Blood Urine Negative Negative    pH Urine 5.5 5.0 - 7.0    Protein Albumin Urine Negative Negative mg/dL    Urobilinogen Urine Normal Normal mg/dL    Nitrite Urine Negative Negative    Leukocyte Esterase Urine 75 Marina/uL (A) Negative    Bacteria Urine Moderate (A) None Seen /HPF    RBC Urine 2 <=2 /HPF    WBC Urine 26 (H) <=5 /HPF    Squamous Epithelials Urine 5 (H) <=1 /HPF   Basic metabolic panel   Result Value Ref Range    Sodium 138 135 - 145 mmol/L    Potassium 5.4 (H) 3.4 - 5.3 mmol/L    Chloride 104 98 - 107 mmol/L    Carbon Dioxide (CO2) 18 (L) 22 - 29 mmol/L    Anion Gap 16 (H) 7 - 15 mmol/L    Urea Nitrogen 47.0 (H) 8.0 - 23.0 mg/dL    Creatinine 2.21 (H) 0.51 - 0.95 mg/dL    GFR Estimate  23 (L) >60 mL/min/1.73m2    Calcium 9.9 8.8 - 10.4 mg/dL    Glucose 106 (H) 70 - 99 mg/dL   CBC (+ platelets, no diff)   Result Value Ref Range    WBC Count 9.0 4.0 - 11.0 10e3/uL    RBC Count 3.72 (L) 3.80 - 5.20 10e6/uL    Hemoglobin 11.4 (L) 11.7 - 15.7 g/dL    Hematocrit 34.2 (L) 35.0 - 47.0 %    MCV 92 78 - 100 fL    MCH 30.6 26.5 - 33.0 pg    MCHC 33.3 31.5 - 36.5 g/dL    RDW 12.5 10.0 - 15.0 %    Platelet Count 282 150 - 450 10e3/uL   Sodium random urine   Result Value Ref Range    Sodium Urine mmol/L 66 mmol/L   Protein  random urine   Result Value Ref Range    Total Protein Urine mg/dL 8.2   mg/dL    Total Protein Urine mg/mg Creat 0.07 0.00 - 0.20 mg/mg Cr    Creatinine Urine mg/dL 119.0 mg/dL   ECG 12-LEAD WITH MUSE (LHE)   Result Value Ref Range    Systolic Blood Pressure  mmHg    Diastolic Blood Pressure  mmHg    Ventricular Rate 71 BPM    Atrial Rate 71 BPM    NC Interval 166 ms    QRS Duration 82 ms     ms    QTc 425 ms    P Axis 56 degrees    R AXIS 20 degrees    T Axis 44 degrees    Interpretation ECG       Sinus rhythm  Normal ECG  When compared with ECG of 06-Jan-2022 23:17,  No significant change was found  Confirmed by SEE ED PROVIDER NOTE FOR, ECG INTERPRETATION ([id number]),  Isabell Edwards ([id number]) on 5/9/2025 2:53:56 PM         RADIOLOGY:  Reviewed all pertinent imaging. Please see official radiology report.  US Renal Complete w Arterial Duplex   Final Result   IMPRESSION:    1.  No evidence of a hemodynamically significant renal artery stenosis.   2.  Increased echogenicity of the renal bregma bilaterally suggestive of medical renal disease.          EKG:    Performed at: 13:10:41    Impression: Sinus rhythm, normal ECG    Rate: 71 BPM  NC Interval: 166 ms  QRS Interval: 82 ms  QTc Interval: 392/425 ms  ST Changes: None  Comparison: When compared with ECG of 06-Jan-2022 23:17, No significant change was found    Dr. Sorto and I have independently reviewed and interpreted the EKG(s)  documented above.    PROCEDURES:   None          Karen Colunga PA-C  Emergency Medicine  [facility] EMERGENCY ROOM  [address] [facility] [address] [phone]             Karen Colunga PA-C  05/09/25 1548

## 2025-05-09 NOTE — CONSULTS
RENAL CONSULT NOTE    REQUESTING PHYSICIAN: Karen Colunga PA-C    REASON FOR CONSULT: SIRIA/Hyperkalemia    ASSESSMENT/PLAN:      PLAN:   -Continue expectant management (avoid Nephrotoxins, renal dose medication, avoid hyper/Hypotension, daily wt, daily I/O).   -S/P IVF 1L NS in ED, HELD Triamterene-hydrochlorothiazide (HOLD upon discharge)  -start bicarb PO  1300 mg daily (continue upon discharge)  -Low K diet encouraged  -Oral Hydration encouraged (Goal fluid intake 60-80 oz daily)  -UPCR, Albumin, Urine NA+, CL, K+,  Renal US, pending  -Has out-patient Nephrology follow up with Elsa Harry PA-C, On Friday 6/27/25 at 1000 AM in our [facility].   -Nephrology okay with discharge when medically cleared. Repeat renal panel 1 WK at PCP, Renal follow up 2-4 WKs.   -Renal panel daily      Non-oliguric SIRIA on  CKD3: SIRIA 2/2 ?pre-renal ATN/?Dry/Started Triamterene-hydrochlorothiazide 2/2025. W/U , pending. See plan above.    Cr trend:   2/9/25 Cr 1.27  4/24     Cr 1.98  4/28     Cr 2.2  5/9/25  Cr  2.2    CKD3: BL Cr 1.5-1.7 since 2018. Cr 1.4 4/2022.  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 Prior UAs without RBC/No Protein/Relatively bland. Last A1C 5.9. UA here with WBCs, no RBCs. Renal US, UPCR, pending.     Hyperkalemia: K 5.4. trending 5.4-5.5 since 4/20205. Pt report high consumption of high K foods. Low K/ renal diet encouraged. Okay with continuing ARB therapy.  PO bicarb started.     Metabolic acidosis: 2/2 SIRIA/CKD3. Start PO bicarb, follow closely.     HTN: PTA on Triamterene-hydrochlorothiazide 37.5-25mg, Losartan 100 mg, Metoprolol 50mg, amlodipine 10 mg.     Volume:No sig edema, ankle trace by the end of day if on feet all day per pt.  on RA, S/P 1L NS IVF in ED, VSS.    Normocytic Anemia: Hg 11.4, follow    HLD: on statin      HPI: 75 YOF with PMH of HTN, HLD, CKD3, Psoriasis, anxiety/Depression, who came to ED from clinic for abnormal labs Cr 2.5 (BL 1.5-1.7), K 5.5.  Pt report flank discomfort, and frequent urination, otherwise no other Sx. Renal consulted for hyperkalemia/SIRIA on CKD3.    Saw pt in waiting room, daughter at her side  No complaints, overall feels great  Denies n, v, c, d, sob, fever, rash or CP  Denies HA, feeling maura   HTN since she was in early 30s  Denies urinary Sx, feels able to empty bladder, Trace ankle edema  Fluid intake at home 40oz daily  Pt reports eating high volume of High K foods, She is working on reducing K food intake  Denies NSAIDs, reports knowing she had ckd3 for years but never has followed with a nephrologist.   Denies UTIs, Stones or Gout Hx.   Started Triamterene hydrochlorothiazide 2/2025  VSS, on RA, no sig edema  Told to come in for abnormal labs/SIRIA/HyperK PTA since end of April  Lived with her daughter PTA  Discussed Labs/plan with pt,  daughter, Dr. Brewer, and JOSEPH Colunga in person today        REVIEW OF SYSTEMS:  Complete 12 point review of systems was negative other than those noted in the HPI      Past Medical History:   Diagnosis Date    Hypertension     Tobacco use disorder - quit 6/12/18 8/25/2008       No current facility-administered medications for this encounter.     Current Outpatient Medications   Medication Sig Dispense Refill    ASPIRIN 81 MG OR TABS ONE DAILY 100 3    betamethasone valerate (VALISONE) 0.1 % external ointment Apply topically 2 times daily 45 g 11    FLUoxetine (PROZAC) 20 MG capsule Take 1 capsule (20 mg) by mouth daily 90 capsule 3    losartan (COZAAR) 100 MG tablet Take 1 tablet (100 mg) by mouth daily for 14 days. 14 tablet 0    metoprolol succinate ER (TOPROL XL) 50 MG 24 hr tablet Take 1 tablet (50 mg) by mouth daily for 14 days. 14 tablet 0    Omega-3 Fatty Acids (FISH OIL PO) Take 1,000 mg by mouth      rosuvastatin (CRESTOR) 5 MG tablet Take 1 tablet (5 mg) by mouth daily 90 tablet 3    triamterene-HCTZ (DYAZIDE) 37.5-25 MG capsule TAKE 1 CAPSULE BY MOUTH EVERY DAY 14 capsule 0    vitamin D3  (CHOLECALCIFEROL) 2000 units (50 mcg) tablet Take 2 tablets (4,000 Units) by mouth daily         ASPIRIN 81 MG OR TABS  betamethasone valerate (VALISONE) 0.1 % external ointment  FLUoxetine (PROZAC) 20 MG capsule  losartan (COZAAR) 100 MG tablet  metoprolol succinate ER (TOPROL XL) 50 MG 24 hr tablet  Omega-3 Fatty Acids (FISH OIL PO)  rosuvastatin (CRESTOR) 5 MG tablet  triamterene-HCTZ (DYAZIDE) 37.5-25 MG capsule  vitamin D3 (CHOLECALCIFEROL) 2000 units (50 mcg) tablet        ALLERGIES/SENSITIVITIES:  Allergies   Allergen Reactions    Atorvastatin      Neck and calf spasms.     Codeine Nausea and Vomiting and Other (See Comments)    Lisinopril Nausea     Social History     Tobacco Use    Smoking status: Former     Current packs/day: 0.00     Average packs/day: 1 pack/day for 31.0 years (31.0 ttl pk-yrs)     Types: Cigarettes     Start date: 1987     Quit date: 2018     Years since quittin.9    Smokeless tobacco: Never   Substance Use Topics    Alcohol use: Yes     Comment: 1 glass of wine per month    Drug use: No     I have reviewed this patient's family history and updated it with pertinent information if needed.  Family History   Problem Relation Age of Onset    Hypertension Father     Cerebrovascular Disease Father     Coronary Artery Disease Father     Diabetes Mother     Hypertension Mother     Leukemia Mother     No Known Problems Brother     No Known Problems Sister     No Known Problems Daughter     No Known Problems Daughter     No Known Problems Sister     No Known Problems Sister     No Known Problems Sister     Breast Cancer No family hx of     Colon Cancer No family hx of          PHYSICAL EXAM:  Physical Exam   Temp: 98.2  F (36.8  C) Temp src: Temporal BP: (!) 148/72 Pulse: 84   Resp: 20 SpO2: 98 % O2 Device: None (Room air)    Vitals:    25 1221   Weight: 85.7 kg (189 lb)     Vital Signs with Ranges  Temp:  [98.2  F (36.8  C)] 98.2  F (36.8  C)  Pulse:  [84] 84  Resp:  [20]  20  BP: (148)/(72) 148/72  SpO2:  [98 %] 98 %  No intake/output data recorded.      Patient Vitals for the past 72 hrs:   Weight   05/09/25 1221 85.7 kg (189 lb)     GEN: NAD, aox3, good historian, accompanied by her daughter   CV: RRR , NO JVD  Lung: clear and equal, on RA  Ab: soft and NT  Ext: trace ankle edema BL and well perfused  Skin: no rash  Psych: cooperative  Neuro: NFD, No asterixis.         Laboratory:     Recent Labs   Lab 05/09/25  1234   WBC 9.0   RBC 3.72*   HGB 11.4*   HCT 34.2*          Basic Metabolic Panel:  Recent Labs   Lab 05/09/25  1234      POTASSIUM 5.4*   CHLORIDE 104   CO2 18*   BUN 47.0*   CR 2.21*   *   JOSE 9.9       INRNo lab results found in last 7 days.    Recent Labs   Lab Test 05/09/25  1234 04/14/22  1607   POTASSIUM 5.4* 4.5   CHLORIDE 104 109   BUN 47.0* 24      Recent Labs   Lab Test 05/09/25  1319 04/14/22  1607 01/09/20  1249 06/11/18  1330   ALBUMIN  --  3.7 4.1  --    BILITOTAL  --  0.6 0.4  --    ALT  --  31 43  --    AST  --  17 19  --    PROTEIN Negative  --   --  Negative       Personally reviewed today's laboratory studies      Thank you for involving us in the care of this patient. We will continue to follow along with you.      Alondra Santana [facility]  [facility]  [phone]

## 2025-05-09 NOTE — ED NOTES
Expected Patient Referral to ED  12:10 PM    Referring Clinic/Provider:  Urgent care    Reason for referral/Clinical facts:  Cr 2.5, K was 5.4. leg swelling    Recommendations provided:  Send to ED for further evaluation    Caller was informed that this institution does possess the capabilities and/or resources to provide for patient and should be transferred to our facility.    Discussed that if direct admit is sought and any hurdles are encountered, this ED would be happy to see the patient and evaluate.    Informed caller that recommendations provided are recommendations based only on the facts provided and that they responsible to accept or reject the advice, or to seek a formal in person consultation as needed and that this ED will see/treat patient should they arrive.      Ernesto Sorto MD  [facility] EMERGENCY ROOM  [facility] [facility] [id number] [phone]       Ernesto Sorto MD  05/09/25 1122

## 2025-05-10 ENCOUNTER — RESULTS FOLLOW-UP (OUTPATIENT)
Dept: NURSING | Facility: CLINIC | Age: 75
End: 2025-05-10

## 2025-05-11 LAB
BACTERIA UR CULT: ABNORMAL
BACTERIA UR CULT: ABNORMAL

## 2025-05-12 ENCOUNTER — TELEPHONE (OUTPATIENT)
Dept: NURSING | Facility: CLINIC | Age: 75
End: 2025-05-12
Payer: COMMERCIAL

## 2025-05-12 ENCOUNTER — PATIENT OUTREACH (OUTPATIENT)
Dept: CARE COORDINATION | Facility: CLINIC | Age: 75
End: 2025-05-12
Payer: COMMERCIAL

## 2025-05-12 NOTE — LETTER
May 12, 2025        Miroslava Handy  [address] [place] [id number]          Dear Miroslava Handy:    You were seen in the [facility] Emergency Department at [facility] on 5/9/2025.  We are unable to reach you by phone, so we are sending you this letter.     It is important that you call [facility] Emergency Department lab result nurse at [phone], as we have information to relay to you AND/OR we MAY have to make some changes in your treatment.    Best time to call back is between 9AM and 5:30PM, 7 days a week.      Sincerely,     [facility] Emergency Department Lab Result RN  [phone]

## 2025-05-12 NOTE — TELEPHONE ENCOUNTER
[facility]     Reason for call: Lab Result Notification     Lab Result (including Rx patient on, if applicable).  If culture, copy of lab report at bottom.  Lab Result: Urine Culture - see below    ED Rx: cephALEXin (KEFLEX) 500 MG capsule - Take 1 capsule (500 mg) by mouth 2 times daily for 5 days   >100,000 CFU/mL Escherichia coli (1) (Susceptible)  >100,000 CFU/mL Escherichia coli (2) (Susceptible)    Creatinine Level (mg/dl)   Creatinine   Date Value Ref Range Status   05/09/2025 2.21 (H) 0.51 - 0.95 mg/dL Final   01/25/2021 1.57 (H) 0.52 - 1.04 mg/dL Final    Creatinine clearance (ml/min), if applicable    Serum creatinine: 2.21 mg/dL (H) 05/09/25 1234  Estimated creatinine clearance: 24.8 mL/min (A)     ED Symptoms: Patient presented to [facility] ED on 5/9/2025 for evaluation of abnormal labs - elevated potassium and creatinine    RN Recommendations/Instructions per [facility] ED lab result protocol:   [facility] ED lab result protocol utilized: Urine Culture  Continue antibiotic/medication as prescribed: cephalexin, pending patient assessment      Unable to reach patient/caregiver.     Left voicemail message requesting a call back to [phone] between 9 a.m. and 5:30 p.m. for patient's ED/ lab results.      Letter pended to be sent via [employer].    JO ANN Roberts RN

## 2025-05-14 ENCOUNTER — PATIENT OUTREACH (OUTPATIENT)
Dept: CARE COORDINATION | Facility: CLINIC | Age: 75
End: 2025-05-14
Payer: COMMERCIAL

## 2025-05-15 NOTE — TELEPHONE ENCOUNTER
5/15/25 at 11:20 am Pt returning VM left by ED Results Team  RN Recommendations/Instructions per [facility] ED lab result protocol:   [facility] ED lab result protocol utilized: Urine Culture    Patient's current Symptoms at time of telephone encounter:   Pt states she has completed her antibiotic and pt states no UTI symptoms.  Pt sees her PCP today 5/15/25.    Patient/care giver notified to contact your PCP clinic or return to the Emergency department if your:  Symptoms return.  Symptoms worsen or other concerning symptoms.     Sobia Giang RN

## 2025-07-13 ENCOUNTER — HEALTH MAINTENANCE LETTER (OUTPATIENT)
Age: 75
End: 2025-07-13